# Patient Record
Sex: FEMALE | Race: WHITE | NOT HISPANIC OR LATINO | URBAN - METROPOLITAN AREA
[De-identification: names, ages, dates, MRNs, and addresses within clinical notes are randomized per-mention and may not be internally consistent; named-entity substitution may affect disease eponyms.]

---

## 2022-07-09 ENCOUNTER — INPATIENT (INPATIENT)
Facility: HOSPITAL | Age: 87
LOS: 19 days | Discharge: NURSING, REHAB OR LONG-TERM CARE | DRG: 870 | End: 2022-07-29
Attending: HOSPITALIST | Admitting: SPECIALIST
Payer: MEDICARE

## 2022-07-09 VITALS
RESPIRATION RATE: 18 BRPM | HEART RATE: 61 BPM | HEIGHT: 62 IN | SYSTOLIC BLOOD PRESSURE: 126 MMHG | DIASTOLIC BLOOD PRESSURE: 52 MMHG | OXYGEN SATURATION: 92 % | WEIGHT: 134.92 LBS

## 2022-07-09 DIAGNOSIS — J96.01 ACUTE RESPIRATORY FAILURE WITH HYPOXIA: ICD-10-CM

## 2022-07-09 LAB
ALBUMIN SERPL ELPH-MCNC: 4.2 G/DL — SIGNIFICANT CHANGE UP (ref 3.3–5)
ALP SERPL-CCNC: 67 U/L — SIGNIFICANT CHANGE UP (ref 40–120)
ALT FLD-CCNC: <5 U/L — LOW (ref 10–45)
ANION GAP SERPL CALC-SCNC: 12 MMOL/L — SIGNIFICANT CHANGE UP (ref 5–17)
APPEARANCE UR: ABNORMAL
APTT BLD: 32.3 SEC — SIGNIFICANT CHANGE UP (ref 27.5–35.5)
AST SERPL-CCNC: 16 U/L — SIGNIFICANT CHANGE UP (ref 10–40)
BACTERIA # UR AUTO: ABNORMAL
BASE EXCESS BLDV CALC-SCNC: 3.1 MMOL/L — HIGH (ref -2–2)
BASE EXCESS BLDV CALC-SCNC: 8.1 MMOL/L — HIGH (ref -2–2)
BASOPHILS # BLD AUTO: 0.06 K/UL — SIGNIFICANT CHANGE UP (ref 0–0.2)
BASOPHILS NFR BLD AUTO: 0.7 % — SIGNIFICANT CHANGE UP (ref 0–2)
BILIRUB SERPL-MCNC: 0.2 MG/DL — SIGNIFICANT CHANGE UP (ref 0.2–1.2)
BILIRUB UR-MCNC: NEGATIVE — SIGNIFICANT CHANGE UP
BUN SERPL-MCNC: 34 MG/DL — HIGH (ref 7–23)
CA-I SERPL-SCNC: 1.14 MMOL/L — LOW (ref 1.15–1.33)
CA-I SERPL-SCNC: 1.16 MMOL/L — SIGNIFICANT CHANGE UP (ref 1.15–1.33)
CALCIUM SERPL-MCNC: 8.8 MG/DL — SIGNIFICANT CHANGE UP (ref 8.4–10.5)
CHLORIDE BLDV-SCNC: 92 MMOL/L — LOW (ref 96–108)
CHLORIDE BLDV-SCNC: 95 MMOL/L — LOW (ref 96–108)
CHLORIDE SERPL-SCNC: 95 MMOL/L — LOW (ref 96–108)
CO2 BLDV-SCNC: 39 MMOL/L — HIGH (ref 22–26)
CO2 BLDV-SCNC: 43 MMOL/L — HIGH (ref 22–26)
CO2 SERPL-SCNC: 32 MMOL/L — HIGH (ref 22–31)
COLOR SPEC: SIGNIFICANT CHANGE UP
CREAT SERPL-MCNC: 0.71 MG/DL — SIGNIFICANT CHANGE UP (ref 0.5–1.3)
DIFF PNL FLD: ABNORMAL
EGFR: 80 ML/MIN/1.73M2 — SIGNIFICANT CHANGE UP
EOSINOPHIL # BLD AUTO: 0.03 K/UL — SIGNIFICANT CHANGE UP (ref 0–0.5)
EOSINOPHIL NFR BLD AUTO: 0.4 % — SIGNIFICANT CHANGE UP (ref 0–6)
EPI CELLS # UR: 8 /HPF — HIGH
GAS PNL BLDA: SIGNIFICANT CHANGE UP
GAS PNL BLDV: 130 MMOL/L — LOW (ref 136–145)
GAS PNL BLDV: 133 MMOL/L — LOW (ref 136–145)
GAS PNL BLDV: SIGNIFICANT CHANGE UP
GAS PNL BLDV: SIGNIFICANT CHANGE UP
GLUCOSE BLDV-MCNC: 116 MG/DL — HIGH (ref 70–99)
GLUCOSE BLDV-MCNC: 272 MG/DL — HIGH (ref 70–99)
GLUCOSE SERPL-MCNC: 120 MG/DL — HIGH (ref 70–99)
GLUCOSE UR QL: NEGATIVE — SIGNIFICANT CHANGE UP
HCO3 BLDV-SCNC: 36 MMOL/L — HIGH (ref 22–29)
HCO3 BLDV-SCNC: 40 MMOL/L — HIGH (ref 22–29)
HCT VFR BLD CALC: 38.1 % — SIGNIFICANT CHANGE UP (ref 34.5–45)
HCT VFR BLDA CALC: 32 % — LOW (ref 34.5–46.5)
HCT VFR BLDA CALC: 36 % — SIGNIFICANT CHANGE UP (ref 34.5–46.5)
HGB BLD CALC-MCNC: 10.6 G/DL — LOW (ref 11.7–16.1)
HGB BLD CALC-MCNC: 11.9 G/DL — SIGNIFICANT CHANGE UP (ref 11.7–16.1)
HGB BLD-MCNC: 11.4 G/DL — LOW (ref 11.5–15.5)
HYALINE CASTS # UR AUTO: 1 /LPF — SIGNIFICANT CHANGE UP (ref 0–7)
IMM GRANULOCYTES NFR BLD AUTO: 0.8 % — SIGNIFICANT CHANGE UP (ref 0–1.5)
INR BLD: 1.04 RATIO — SIGNIFICANT CHANGE UP (ref 0.88–1.16)
KETONES UR-MCNC: NEGATIVE — SIGNIFICANT CHANGE UP
LACTATE BLDV-MCNC: 1.5 MMOL/L — SIGNIFICANT CHANGE UP (ref 0.7–2)
LACTATE BLDV-MCNC: 1.5 MMOL/L — SIGNIFICANT CHANGE UP (ref 0.7–2)
LACTATE SERPL-SCNC: 2.9 MMOL/L — HIGH (ref 0.7–2)
LACTATE SERPL-SCNC: 3.7 MMOL/L — HIGH (ref 0.7–2)
LEUKOCYTE ESTERASE UR-ACNC: ABNORMAL
LYMPHOCYTES # BLD AUTO: 0.7 K/UL — LOW (ref 1–3.3)
LYMPHOCYTES # BLD AUTO: 8.3 % — LOW (ref 13–44)
MCHC RBC-ENTMCNC: 29.9 GM/DL — LOW (ref 32–36)
MCHC RBC-ENTMCNC: 29.9 PG — SIGNIFICANT CHANGE UP (ref 27–34)
MCV RBC AUTO: 100 FL — SIGNIFICANT CHANGE UP (ref 80–100)
MONOCYTES # BLD AUTO: 0.68 K/UL — SIGNIFICANT CHANGE UP (ref 0–0.9)
MONOCYTES NFR BLD AUTO: 8.1 % — SIGNIFICANT CHANGE UP (ref 2–14)
NEUTROPHILS # BLD AUTO: 6.85 K/UL — SIGNIFICANT CHANGE UP (ref 1.8–7.4)
NEUTROPHILS NFR BLD AUTO: 81.7 % — HIGH (ref 43–77)
NITRITE UR-MCNC: NEGATIVE — SIGNIFICANT CHANGE UP
NRBC # BLD: 0 /100 WBCS — SIGNIFICANT CHANGE UP (ref 0–0)
NT-PROBNP SERPL-SCNC: 3075 PG/ML — HIGH (ref 0–300)
PCO2 BLDV: 104 MMHG — HIGH (ref 39–42)
PCO2 BLDV: 117 MMHG — HIGH (ref 39–42)
PH BLDV: 7.09 — CRITICAL LOW (ref 7.32–7.43)
PH BLDV: 7.19 — CRITICAL LOW (ref 7.32–7.43)
PH UR: 6 — SIGNIFICANT CHANGE UP (ref 5–8)
PLATELET # BLD AUTO: 254 K/UL — SIGNIFICANT CHANGE UP (ref 150–400)
PO2 BLDV: 29 MMHG — SIGNIFICANT CHANGE UP (ref 25–45)
PO2 BLDV: 38 MMHG — SIGNIFICANT CHANGE UP (ref 25–45)
POTASSIUM BLDV-SCNC: 5 MMOL/L — SIGNIFICANT CHANGE UP (ref 3.5–5.1)
POTASSIUM BLDV-SCNC: 5.6 MMOL/L — HIGH (ref 3.5–5.1)
POTASSIUM SERPL-MCNC: 5 MMOL/L — SIGNIFICANT CHANGE UP (ref 3.5–5.3)
POTASSIUM SERPL-SCNC: 5 MMOL/L — SIGNIFICANT CHANGE UP (ref 3.5–5.3)
PROCALCITONIN SERPL-MCNC: 0.11 NG/ML — HIGH (ref 0.02–0.1)
PROT SERPL-MCNC: 7.4 G/DL — SIGNIFICANT CHANGE UP (ref 6–8.3)
PROT UR-MCNC: ABNORMAL
PROTHROM AB SERPL-ACNC: 12 SEC — SIGNIFICANT CHANGE UP (ref 10.5–13.4)
RAPID RVP RESULT: SIGNIFICANT CHANGE UP
RBC # BLD: 3.81 M/UL — SIGNIFICANT CHANGE UP (ref 3.8–5.2)
RBC # FLD: 14.8 % — HIGH (ref 10.3–14.5)
RBC CASTS # UR COMP ASSIST: 7 /HPF — HIGH (ref 0–4)
SAO2 % BLDV: 38.5 % — LOW (ref 67–88)
SAO2 % BLDV: 63.6 % — LOW (ref 67–88)
SARS-COV-2 RNA SPEC QL NAA+PROBE: SIGNIFICANT CHANGE UP
SODIUM SERPL-SCNC: 139 MMOL/L — SIGNIFICANT CHANGE UP (ref 135–145)
SP GR SPEC: 1.01 — LOW (ref 1.01–1.02)
TROPONIN T, HIGH SENSITIVITY RESULT: 46 NG/L — SIGNIFICANT CHANGE UP (ref 0–51)
UROBILINOGEN FLD QL: NEGATIVE — SIGNIFICANT CHANGE UP
WBC # BLD: 8.39 K/UL — SIGNIFICANT CHANGE UP (ref 3.8–10.5)
WBC # FLD AUTO: 8.39 K/UL — SIGNIFICANT CHANGE UP (ref 3.8–10.5)
WBC UR QL: 14 /HPF — HIGH (ref 0–5)

## 2022-07-09 PROCEDURE — 93010 ELECTROCARDIOGRAM REPORT: CPT | Mod: 59,GC

## 2022-07-09 PROCEDURE — 70450 CT HEAD/BRAIN W/O DYE: CPT | Mod: 26

## 2022-07-09 PROCEDURE — 99291 CRITICAL CARE FIRST HOUR: CPT | Mod: 25

## 2022-07-09 PROCEDURE — 71045 X-RAY EXAM CHEST 1 VIEW: CPT | Mod: 26,76

## 2022-07-09 PROCEDURE — 99291 CRITICAL CARE FIRST HOUR: CPT | Mod: FT,CS,25,GC

## 2022-07-09 PROCEDURE — 31500 INSERT EMERGENCY AIRWAY: CPT | Mod: GC

## 2022-07-09 PROCEDURE — 71045 X-RAY EXAM CHEST 1 VIEW: CPT | Mod: 26,77

## 2022-07-09 RX ORDER — IPRATROPIUM/ALBUTEROL SULFATE 18-103MCG
3 AEROSOL WITH ADAPTER (GRAM) INHALATION ONCE
Refills: 0 | Status: COMPLETED | OUTPATIENT
Start: 2022-07-09 | End: 2022-07-09

## 2022-07-09 RX ORDER — LOSARTAN POTASSIUM 100 MG/1
50 TABLET, FILM COATED ORAL DAILY
Refills: 0 | Status: DISCONTINUED | OUTPATIENT
Start: 2022-07-09 | End: 2022-07-19

## 2022-07-09 RX ORDER — ONDANSETRON 8 MG/1
4 TABLET, FILM COATED ORAL ONCE
Refills: 0 | Status: COMPLETED | OUTPATIENT
Start: 2022-07-09 | End: 2022-07-09

## 2022-07-09 RX ORDER — FUROSEMIDE 40 MG
40 TABLET ORAL DAILY
Refills: 0 | Status: DISCONTINUED | OUTPATIENT
Start: 2022-07-09 | End: 2022-07-09

## 2022-07-09 RX ORDER — VANCOMYCIN HCL 1 G
1000 VIAL (EA) INTRAVENOUS EVERY 12 HOURS
Refills: 0 | Status: DISCONTINUED | OUTPATIENT
Start: 2022-07-09 | End: 2022-07-10

## 2022-07-09 RX ORDER — HYDRALAZINE HCL 50 MG
5 TABLET ORAL ONCE
Refills: 0 | Status: COMPLETED | OUTPATIENT
Start: 2022-07-09 | End: 2022-07-09

## 2022-07-09 RX ORDER — METOPROLOL TARTRATE 50 MG
50 TABLET ORAL DAILY
Refills: 0 | Status: DISCONTINUED | OUTPATIENT
Start: 2022-07-09 | End: 2022-07-10

## 2022-07-09 RX ORDER — FENTANYL CITRATE 50 UG/ML
1 INJECTION INTRAVENOUS
Qty: 5000 | Refills: 0 | Status: DISCONTINUED | OUTPATIENT
Start: 2022-07-09 | End: 2022-07-11

## 2022-07-09 RX ORDER — ESCITALOPRAM OXALATE 10 MG/1
1 TABLET, FILM COATED ORAL
Qty: 0 | Refills: 0 | DISCHARGE

## 2022-07-09 RX ORDER — LEVOTHYROXINE SODIUM 125 MCG
100 TABLET ORAL DAILY
Refills: 0 | Status: DISCONTINUED | OUTPATIENT
Start: 2022-07-09 | End: 2022-07-29

## 2022-07-09 RX ORDER — SENNA PLUS 8.6 MG/1
1 TABLET ORAL
Qty: 0 | Refills: 0 | DISCHARGE

## 2022-07-09 RX ORDER — PIPERACILLIN AND TAZOBACTAM 4; .5 G/20ML; G/20ML
3.38 INJECTION, POWDER, LYOPHILIZED, FOR SOLUTION INTRAVENOUS ONCE
Refills: 0 | Status: COMPLETED | OUTPATIENT
Start: 2022-07-09 | End: 2022-07-09

## 2022-07-09 RX ORDER — LOSARTAN POTASSIUM 100 MG/1
50 TABLET, FILM COATED ORAL DAILY
Refills: 0 | Status: DISCONTINUED | OUTPATIENT
Start: 2022-07-09 | End: 2022-07-09

## 2022-07-09 RX ORDER — MIRTAZAPINE 45 MG/1
1 TABLET, ORALLY DISINTEGRATING ORAL
Qty: 0 | Refills: 0 | DISCHARGE

## 2022-07-09 RX ORDER — PIPERACILLIN AND TAZOBACTAM 4; .5 G/20ML; G/20ML
3.38 INJECTION, POWDER, LYOPHILIZED, FOR SOLUTION INTRAVENOUS EVERY 8 HOURS
Refills: 0 | Status: DISCONTINUED | OUTPATIENT
Start: 2022-07-09 | End: 2022-07-09

## 2022-07-09 RX ORDER — MOMETASONE FUROATE 220 UG/1
1 INHALANT RESPIRATORY (INHALATION) DAILY
Refills: 0 | Status: DISCONTINUED | OUTPATIENT
Start: 2022-07-09 | End: 2022-07-09

## 2022-07-09 RX ORDER — AZITHROMYCIN 500 MG/1
500 TABLET, FILM COATED ORAL ONCE
Refills: 0 | Status: COMPLETED | OUTPATIENT
Start: 2022-07-09 | End: 2022-07-09

## 2022-07-09 RX ORDER — PIPERACILLIN AND TAZOBACTAM 4; .5 G/20ML; G/20ML
3.38 INJECTION, POWDER, LYOPHILIZED, FOR SOLUTION INTRAVENOUS EVERY 8 HOURS
Refills: 0 | Status: DISCONTINUED | OUTPATIENT
Start: 2022-07-09 | End: 2022-07-11

## 2022-07-09 RX ORDER — COLLAGENASE CLOSTRIDIUM HIST. 250 UNIT/G
1 OINTMENT (GRAM) TOPICAL
Qty: 0 | Refills: 0 | DISCHARGE

## 2022-07-09 RX ORDER — CHLORHEXIDINE GLUCONATE 213 G/1000ML
15 SOLUTION TOPICAL EVERY 12 HOURS
Refills: 0 | Status: DISCONTINUED | OUTPATIENT
Start: 2022-07-09 | End: 2022-07-15

## 2022-07-09 RX ORDER — AMLODIPINE BESYLATE 2.5 MG/1
10 TABLET ORAL DAILY
Refills: 0 | Status: DISCONTINUED | OUTPATIENT
Start: 2022-07-09 | End: 2022-07-20

## 2022-07-09 RX ORDER — FUROSEMIDE 40 MG
40 TABLET ORAL DAILY
Refills: 0 | Status: COMPLETED | OUTPATIENT
Start: 2022-07-09 | End: 2022-07-13

## 2022-07-09 RX ORDER — MIDAZOLAM HYDROCHLORIDE 1 MG/ML
2 INJECTION, SOLUTION INTRAMUSCULAR; INTRAVENOUS ONCE
Refills: 0 | Status: DISCONTINUED | OUTPATIENT
Start: 2022-07-09 | End: 2022-07-09

## 2022-07-09 RX ORDER — ASCORBIC ACID 60 MG
1 TABLET,CHEWABLE ORAL
Qty: 0 | Refills: 0 | DISCHARGE

## 2022-07-09 RX ORDER — SIMVASTATIN 20 MG/1
1 TABLET, FILM COATED ORAL
Qty: 0 | Refills: 0 | DISCHARGE

## 2022-07-09 RX ORDER — APIXABAN 2.5 MG/1
5 TABLET, FILM COATED ORAL EVERY 12 HOURS
Refills: 0 | Status: DISCONTINUED | OUTPATIENT
Start: 2022-07-09 | End: 2022-07-23

## 2022-07-09 RX ORDER — CHLORHEXIDINE GLUCONATE 213 G/1000ML
1 SOLUTION TOPICAL
Refills: 0 | Status: DISCONTINUED | OUTPATIENT
Start: 2022-07-09 | End: 2022-07-29

## 2022-07-09 RX ORDER — IPRATROPIUM/ALBUTEROL SULFATE 18-103MCG
3 AEROSOL WITH ADAPTER (GRAM) INHALATION
Qty: 0 | Refills: 0 | DISCHARGE

## 2022-07-09 RX ORDER — PIPERACILLIN AND TAZOBACTAM 4; .5 G/20ML; G/20ML
3.38 INJECTION, POWDER, LYOPHILIZED, FOR SOLUTION INTRAVENOUS ONCE
Refills: 0 | Status: DISCONTINUED | OUTPATIENT
Start: 2022-07-09 | End: 2022-07-09

## 2022-07-09 RX ORDER — LANOLIN ALCOHOL/MO/W.PET/CERES
1 CREAM (GRAM) TOPICAL
Qty: 0 | Refills: 0 | DISCHARGE

## 2022-07-09 RX ORDER — AMLODIPINE BESYLATE 2.5 MG/1
10 TABLET ORAL DAILY
Refills: 0 | Status: DISCONTINUED | OUTPATIENT
Start: 2022-07-09 | End: 2022-07-09

## 2022-07-09 RX ORDER — ROCURONIUM BROMIDE 10 MG/ML
100 VIAL (ML) INTRAVENOUS ONCE
Refills: 0 | Status: COMPLETED | OUTPATIENT
Start: 2022-07-09 | End: 2022-07-09

## 2022-07-09 RX ORDER — CARBIDOPA AND LEVODOPA 25; 100 MG/1; MG/1
1 TABLET ORAL THREE TIMES A DAY
Refills: 0 | Status: DISCONTINUED | OUTPATIENT
Start: 2022-07-09 | End: 2022-07-29

## 2022-07-09 RX ORDER — METOPROLOL TARTRATE 50 MG
50 TABLET ORAL DAILY
Refills: 0 | Status: DISCONTINUED | OUTPATIENT
Start: 2022-07-09 | End: 2022-07-09

## 2022-07-09 RX ORDER — DEXMEDETOMIDINE HYDROCHLORIDE IN 0.9% SODIUM CHLORIDE 4 UG/ML
0.2 INJECTION INTRAVENOUS
Qty: 200 | Refills: 0 | Status: DISCONTINUED | OUTPATIENT
Start: 2022-07-09 | End: 2022-07-10

## 2022-07-09 RX ORDER — SODIUM CHLORIDE 9 MG/ML
500 INJECTION INTRAMUSCULAR; INTRAVENOUS; SUBCUTANEOUS ONCE
Refills: 0 | Status: COMPLETED | OUTPATIENT
Start: 2022-07-09 | End: 2022-07-09

## 2022-07-09 RX ORDER — DOCUSATE SODIUM 100 MG
1 CAPSULE ORAL
Qty: 0 | Refills: 0 | DISCHARGE

## 2022-07-09 RX ORDER — BETHANECHOL CHLORIDE 25 MG
25 TABLET ORAL THREE TIMES A DAY
Refills: 0 | Status: DISCONTINUED | OUTPATIENT
Start: 2022-07-09 | End: 2022-07-26

## 2022-07-09 RX ORDER — ACETAMINOPHEN 500 MG
650 TABLET ORAL EVERY 6 HOURS
Refills: 0 | Status: DISCONTINUED | OUTPATIENT
Start: 2022-07-09 | End: 2022-07-12

## 2022-07-09 RX ORDER — ETOMIDATE 2 MG/ML
10 INJECTION INTRAVENOUS ONCE
Refills: 0 | Status: COMPLETED | OUTPATIENT
Start: 2022-07-09 | End: 2022-07-09

## 2022-07-09 RX ORDER — COLLAGENASE CLOSTRIDIUM HIST. 250 UNIT/G
1 OINTMENT (GRAM) TOPICAL DAILY
Refills: 0 | Status: DISCONTINUED | OUTPATIENT
Start: 2022-07-09 | End: 2022-07-12

## 2022-07-09 RX ADMIN — SODIUM CHLORIDE 500 MILLILITER(S): 9 INJECTION INTRAMUSCULAR; INTRAVENOUS; SUBCUTANEOUS at 03:51

## 2022-07-09 RX ADMIN — ONDANSETRON 4 MILLIGRAM(S): 8 TABLET, FILM COATED ORAL at 02:03

## 2022-07-09 RX ADMIN — CHLORHEXIDINE GLUCONATE 15 MILLILITER(S): 213 SOLUTION TOPICAL at 18:15

## 2022-07-09 RX ADMIN — Medication 250 MILLIGRAM(S): at 18:14

## 2022-07-09 RX ADMIN — Medication 3 MILLILITER(S): at 02:18

## 2022-07-09 RX ADMIN — DEXMEDETOMIDINE HYDROCHLORIDE IN 0.9% SODIUM CHLORIDE 3.06 MICROGRAM(S)/KG/HR: 4 INJECTION INTRAVENOUS at 06:58

## 2022-07-09 RX ADMIN — Medication 100 MILLIGRAM(S): at 04:41

## 2022-07-09 RX ADMIN — Medication 650 MILLIGRAM(S): at 21:25

## 2022-07-09 RX ADMIN — PIPERACILLIN AND TAZOBACTAM 25 GRAM(S): 4; .5 INJECTION, POWDER, LYOPHILIZED, FOR SOLUTION INTRAVENOUS at 14:10

## 2022-07-09 RX ADMIN — CARBIDOPA AND LEVODOPA 1 TABLET(S): 25; 100 TABLET ORAL at 21:28

## 2022-07-09 RX ADMIN — Medication 25 MILLIGRAM(S): at 21:27

## 2022-07-09 RX ADMIN — CHLORHEXIDINE GLUCONATE 15 MILLILITER(S): 213 SOLUTION TOPICAL at 07:21

## 2022-07-09 RX ADMIN — FENTANYL CITRATE 3.06 MICROGRAM(S)/KG/HR: 50 INJECTION INTRAVENOUS at 06:58

## 2022-07-09 RX ADMIN — Medication 3 MILLILITER(S): at 02:00

## 2022-07-09 RX ADMIN — MIDAZOLAM HYDROCHLORIDE 2 MILLIGRAM(S): 1 INJECTION, SOLUTION INTRAMUSCULAR; INTRAVENOUS at 06:58

## 2022-07-09 RX ADMIN — DEXMEDETOMIDINE HYDROCHLORIDE IN 0.9% SODIUM CHLORIDE 3.06 MICROGRAM(S)/KG/HR: 4 INJECTION INTRAVENOUS at 14:10

## 2022-07-09 RX ADMIN — AZITHROMYCIN 255 MILLIGRAM(S): 500 TABLET, FILM COATED ORAL at 05:02

## 2022-07-09 RX ADMIN — Medication 5 MILLIGRAM(S): at 21:24

## 2022-07-09 RX ADMIN — DEXMEDETOMIDINE HYDROCHLORIDE IN 0.9% SODIUM CHLORIDE 3.06 MICROGRAM(S)/KG/HR: 4 INJECTION INTRAVENOUS at 22:00

## 2022-07-09 RX ADMIN — PIPERACILLIN AND TAZOBACTAM 25 GRAM(S): 4; .5 INJECTION, POWDER, LYOPHILIZED, FOR SOLUTION INTRAVENOUS at 10:15

## 2022-07-09 RX ADMIN — Medication 250 MILLIGRAM(S): at 08:02

## 2022-07-09 RX ADMIN — FENTANYL CITRATE 3.06 MICROGRAM(S)/KG/HR: 50 INJECTION INTRAVENOUS at 05:00

## 2022-07-09 RX ADMIN — Medication 1 APPLICATION(S): at 21:28

## 2022-07-09 RX ADMIN — Medication 40 MILLIGRAM(S): at 12:34

## 2022-07-09 RX ADMIN — PIPERACILLIN AND TAZOBACTAM 200 GRAM(S): 4; .5 INJECTION, POWDER, LYOPHILIZED, FOR SOLUTION INTRAVENOUS at 02:19

## 2022-07-09 RX ADMIN — ETOMIDATE 10 MILLIGRAM(S): 2 INJECTION INTRAVENOUS at 04:40

## 2022-07-09 RX ADMIN — PIPERACILLIN AND TAZOBACTAM 25 GRAM(S): 4; .5 INJECTION, POWDER, LYOPHILIZED, FOR SOLUTION INTRAVENOUS at 21:25

## 2022-07-09 NOTE — ED PROVIDER NOTE - CARE PLAN
1 Principal Discharge DX:	Acute respiratory failure with hypoxia and hypercapnia  Secondary Diagnosis:	Pneumonia

## 2022-07-09 NOTE — H&P ADULT - ASSESSMENT
89 year old female with PMH of Parkinson's disease, HTN, chronic DVT on Eliquis, and hypothyroidism  who was admitted for acute hypoxic respiratory failure most likely due to pneumonia. Intubated in the ED.     Neuro:  - AOx1 on presentation, unknown baseline   - Sedated with fentanyl, will gradually transition to only precedex  - unequal pupils, CT head ordered, will f/u  #Parkinson  -continue carbidopa/levodopa via OG tube     Cardiovascular:  #Heart-failure?  - BNP 3075  - ordered echo, will f/u  - started home dose of lasix   #Chronic DVT  - will continue Eliquis once CT head negative     #Hypertension  - started BP home meds, Losartan, Metoprolol and Amlodipine     Pulmonary:  #Acute Hypoxic Respiratory Failure   - Intubated, Vent (A/C) Volume: 400 Rate: 24 FiO2: 50 PEEP: 5  - ABG in the morning   #Pneumonia  - Starting Zosyn and Vancomycin (has decubitus ulcer, stage 4 on back)  - Consider bronchoscopy for mucus plug   - follow up sputum cultures   #COPD?  - Uses fluticasone and Duoneb at nursing home, will continue    GI/Nutrition:  - No active issues   - trickle feeds via OG tube     Renal:  - Creatinine .73  - No active issues   - Monitor urine output   - CMP daily     ID:  - Started Zosyn for potential aspiration pneumonia and uti (+U/A)   - Started Vancomycin due to decubitals ulcer  - Nasal Swab for MRSA ordered, will f/u  - F/u blood, urine, and sputum cultures     Heme:   #anemia  - Hemoglobin 11.4, unclear baseline. No active bleeding   - CBC daily     Endocrine:  #Hypothyroidism  - will continue home med, Synthroid     Ethics:  -CODE STATUS UNKNOWN. Unable to reach healthcare proxy overnight.     DVT: Eliquis                        89 year old female with PMH of Parkinson's disease, HTN, chronic DVT on Eliquis, and hypothyroidism  who was admitted for acute hypoxic respiratory failure most likely due to pneumonia. Intubated in the ED.     Neuro:  - AOx1 on presentation, unknown baseline   - Sedated with fentanyl, will gradually transition to only precedex  - unequal pupils, CT head ordered, will f/u  #Parkinson  -continue carbidopa/levodopa via NG tube     Cardiovascular:  #Heart-failure?  - BNP 3075  - ordered echo, will f/u  - started home dose of lasix   #Chronic DVT  - will continue Eliquis once CT head negative     #Hypertension  - started BP home meds, Losartan, Metoprolol and Amlodipine     Pulmonary:  #Acute Hypoxic Respiratory Failure   - Intubated, Vent (A/C) Volume: 400 Rate: 24 FiO2: 50 PEEP: 5  - ABG in the morning   #Pneumonia  - Starting Zosyn and Vancomycin (has decubitus ulcer, stage 4 on back)  - Consider bronchoscopy for mucus plug   - follow up sputum cultures   #COPD?  - Uses fluticasone and Duoneb at nursing home, will continue    GI/Nutrition:  - No active issues   - trickle feeds via OG tube     Renal:  - Creatinine .73  - No active issues   - Monitor urine output   - CMP daily     ID:  - Started Zosyn for potential aspiration pneumonia and uti (+U/A)   - Started Vancomycin due to decubitals ulcer  - Nasal Swab for MRSA ordered, will f/u  - F/u blood, urine, and sputum cultures     Heme:   #anemia  - Hemoglobin 11.4, unclear baseline. No active bleeding   - CBC daily     Endocrine:  #Hypothyroidism  - will continue home med, Synthroid     Skin  #decubitus ulcer, stage 4   - wound care consult, will f/u    Ethics:  -CODE STATUS UNKNOWN. Unable to reach healthcare proxy overnight.     DVT: Eliquis                        89 year old female with PMH of Parkinson's disease, HTN, chronic DVT on Eliquis, and hypothyroidism  who was admitted for acute hypoxic respiratory failure most likely due to pneumonia. Intubated in the ED.     Neuro:  - AOx1 on presentation, unknown baseline   - Sedated with fentanyl, will gradually transition to only precedex  - unequal pupils, CT head ordered, will f/u  #Parkinson  -continue carbidopa/levodopa via OG tube     Cardiovascular:  #Heart-failure?  - BNP 3075  - ordered echo, will f/u  - started home dose of lasix   #Chronic DVT  - will continue Eliquis once CT head negative     #Hypertension  - started BP home meds, Losartan, Metoprolol and Amlodipine     Pulmonary:  #Acute Hypoxic Respiratory Failure   - Intubated, Vent (A/C) Volume: 400 Rate: 24 FiO2: 50 PEEP: 5  - ABG in the morning   #Pneumonia  - Starting Zosyn and Vancomycin (has decubitus ulcer, stage 4 on back)  - Consider bronchoscopy for mucus plug   - follow up sputum cultures   #COPD?  - Uses fluticasone and Duoneb at nursing home, will continue    GI/Nutrition:  - No active issues   - trickle feeds via OG tube     Renal:  - Creatinine .73  - No active issues   - Monitor urine output   - CMP daily     ID:  - Started Zosyn for potential aspiration pneumonia and uti (+U/A)   - Started Vancomycin due to decubitals ulcer  - Nasal Swab for MRSA ordered, will f/u  - F/u blood, urine, and sputum cultures     Heme:   #anemia  - Hemoglobin 11.4, unclear baseline. No active bleeding   - CBC daily     Endocrine:  #Hypothyroidism  - will continue home med, Synthroid     Skin  #decubitus ulcer, stage 4   - wound care consult, will f/u    Ethics:  -CODE STATUS UNKNOWN. Unable to reach healthcare proxy overnight.     DVT: Eliquis

## 2022-07-09 NOTE — ED ADULT NURSE NOTE - OBJECTIVE STATEMENT
pt is a 89 y.o female being brought in by EMS from nursing home. EMS states patients oxygen saturation was low, nursing home called for pt to come to hospital. Upon assessment, pt is A&Ox1, breathing non labored on 6L nasal cannula, NSR on cardiac monitor, skin warm/dry, unstageable pressure injury to sacrum area, pressure injury cleaned/dressing changed, patient afebrile. Hep lock placed/intact, labs drawn/sent, vitals taken, pt repositioned/changed, purewick applied/suctioning, all safety measures in place, pt resting comfortably in bed, dispo pending lab results.

## 2022-07-09 NOTE — H&P ADULT - NSHPPHYSICALEXAM_GEN_ALL_CORE
General:   HEENT:   Neck:   Respiratory:   Cardiovascular:   Abdomen:   Extremities:  Neurological: General: Sedated and Intubated in no acute distress  HEENT: Pupils reactive to light, asymmetrical pupils   Neck: No thyromegaly, No JVD, no tender lymphadenopathy   Respiratory: Rhodochrous breath sounds bilaterally, no wheezes, no crackles    Cardiovascular: Regular rate and rhythm, no murmurs, rubs, or gallops   Abdomen: Soft, non-distended, non-tender, normoactive bowel sounds  Extremities: Warm, well perfused, no pedal edema   Neurological: Intubated and Sedated, does not follow verbal commands

## 2022-07-09 NOTE — ED PROVIDER NOTE - NSICDXPASTMEDICALHX_GEN_ALL_CORE_FT
PAST MEDICAL HISTORY:  DVT, lower extremity     HLD (hyperlipidemia)     HTN (hypertension)     Hypothyroid     Parkinsons

## 2022-07-09 NOTE — ED PROVIDER NOTE - ATTENDING CONTRIBUTION TO CARE
Attending (Dustin Diallo D.O.): I have personally seen and examined this patient. I have performed a substantive portion of the visit including all aspects of the medical decision making. Resident and/or ACP note reviewed. I agree on the plan of care except where noted.    I have personally provided 31 minutes of critical care concurrently with the resident(s) and/or ACP(s), excluding time spent on separate procedures.     89F hx of Parkinson's disease, HTN, chronic DVT on Eliquis, and hypothyroidism here from [facility] 2/2 hypoxia, unclear to what extent. Found on their xray to have pneumonitis, started on zosyn. Patient here abcs intacts though bradycardic to 50s. Sat 95%+ on 4L NC, contractures to all 4 extremities/ridgitis, suspect from parkinsons. Suspect sepsis 2/2 now likely PNA causing acute hypoxic resp failure. Check labs, lactate, UA, CXs, CXR, screening EKG, empiric abxs, nebs given scant exp wheezes on exam-> crystalloid infusion as clinically warranted.

## 2022-07-09 NOTE — ED ADULT NURSE REASSESSMENT NOTE - NS ED NURSE REASSESS COMMENT FT1
patient straight cath'd using sterile technique with 2 RN's at bedside, urine flowing to gravity yellow/clear, UA/UC sent.

## 2022-07-09 NOTE — PROGRESS NOTE ADULT - ASSESSMENT
89 year old female with PMH of Parkinson's disease, HTN, chronic DVT on Eliquis, and hypothyroidism  who was admitted for acute hypoxic respiratory failure most likely due to pneumonia. Intubated in the ED.     Neuro:  - AOx1 on presentation, unknown baseline   - Sedated with fentanyl, will gradually transition to only precedex  - unequal pupils, CT head ordered, will f/u  #Parkinson  -continue carbidopa/levodopa via OG tube     Cardiovascular:  #Heart-failure?  - BNP 3075  - ordered echo, will f/u  - started home dose of lasix   #Chronic DVT  - will continue Eliquis once CT head negative     #Hypertension  - started BP home meds, Losartan, Metoprolol and Amlodipine     Pulmonary:  #Acute Hypoxic Respiratory Failure   - Intubated, Vent (A/C) Volume: 400 Rate: 24 FiO2: 50 PEEP: 5  - ABG in the morning   #Pneumonia  - Starting Zosyn and Vancomycin (has decubitus ulcer, stage 4 on back)  - Consider bronchoscopy for mucus plug   - follow up sputum cultures   #COPD?  - Uses fluticasone and Duoneb at nursing home, will continue    GI/Nutrition:  - No active issues   - trickle feeds via OG tube     Renal:  - Creatinine .73  - No active issues   - Monitor urine output   - CMP daily     ID:  - Started Zosyn for potential aspiration pneumonia and uti (+U/A)   - Started Vancomycin due to decubitals ulcer  - Nasal Swab for MRSA ordered, will f/u  - F/u blood, urine, and sputum cultures     Heme:   #anemia  - Hemoglobin 11.4, unclear baseline. No active bleeding   - CBC daily     Endocrine:  #Hypothyroidism  - will continue home med, Synthroid     Skin  #decubitus ulcer, stage 4   - wound care consult, will f/u    Ethics:  -CODE STATUS UNKNOWN. Unable to reach healthcare proxy overnight.     DVT: Eliquis                        89 year old female with PMH of Parkinson's disease, HTN, chronic DVT on Eliquis, and hypothyroidism  who was admitted for acute hypoxic respiratory failure most likely 2/2 PNA, currently intubated.     Neuro:  - AOx1 on presentation, unknown baseline   - Sedated with fentanyl, will gradually transition to only precedex  - unequal pupils, CT head ordered, will f/u    #Parkinson  -continue carbidopa/levodopa via OG tube     Cardiovascular:  #Heart-failure?  - BNP 3075  - ordered echo, will f/u  - started home dose of lasix   #Chronic DVT  - will continue Eliquis once CT head negative     #Hypertension  - holding home meds     Pulmonary:  #Acute Hypoxic Respiratory Failure   - Intubated, Vent (A/C) Volume: 400 Rate: 24 FiO2: 50 PEEP: 5  - will pull back ET tube by 2cm and do repeat CXR  - ABG in the morning   #Pneumonia  - Starting Zosyn and Vancomycin (has decubitus ulcer, stage 4 on back)  - Consider bronchoscopy for mucus plug   - follow up sputum cultures   #COPD?  - Uses fluticasone and Duoneb at nursing home, will continue    GI/Nutrition:  - No active issues   - trickle feeds via OG tube     Renal:  - Creatinine .73  - No active issues   - Monitor urine output, foster  - 40 IV lasix now, will watch for response   - f/u Cr  - CMP daily     ID:  - Started Zosyn for potential aspiration pneumonia and uti (+U/A)   - Started Vancomycin due to decubitals ulcer  - Nasal Swab for MRSA ordered, will f/u  - F/u blood, urine, and sputum cultures     Heme:   #anemia  - Hemoglobin 11.4, unclear baseline. No active bleeding   - CBC daily     Endocrine:  #Hypothyroidism  - will continue home med, Synthroid     Skin  #decubitus ulcer, stage 4   - wound care consult, will f/u    Ethics:  -CODE STATUS UNKNOWN. Unable to reach healthcare proxy overnight. Will find out about court appointed guardian.    DVT: Eliquis                        89 year old female with PMH of Parkinson's disease, HTN, chronic DVT on Eliquis, and hypothyroidism  who was admitted for acute hypoxic respiratory failure most likely 2/2 PNA, currently intubated and treated with zosyn and vancomycin.     Neuro:  - AOx1 on presentation, unknown baseline   - Sedated with fentanyl, will gradually transition to only precedex  - unequal pupils noted on exam  - CT head with no acute intracranial hemorrhage, hydrocephalus or extra-axial fluid collection. mild parenchymal volume loss and mild chronic microvascular ischemic changes    #Parkinson  -continue carbidopa/levodopa via OG tube     Cardiovascular:  #Heart-failure?  - BNP 3075  - ordered echo, will f/u  - 40mg IV lasix now, monitor I/Os    #Chronic DVT  - CT head negative  - apixaban 5mg q12hrs    #Hypertension  - c/w home amlodipine     Pulmonary:  #Acute Hypoxic Respiratory Failure   - Intubated, Vent (A/C) Volume: 400 Rate: 24 FiO2: 50 PEEP: 5  - will pull back ET tube by 2cm and do repeat CXR  - ABG in the morning     #Pneumonia  - Starting Zosyn and Vancomycin (has decubitus ulcer, stage 4 on back)  - Consider bronchoscopy for mucus plug   - follow up sputum cultures   #COPD?  - Uses fluticasone and Duoneb at nursing home, will continue    GI/Nutrition:  - No active issues   - trickle feeds via OG tube     Renal:  - Creatinine 0.73  - Monitor urine output, foster  - 40 IV lasix now, will watch for response   - f/u Cr, CMP daily    ID:  - Started Zosyn for potential aspiration pneumonia and uti (+U/A)   - Started Vancomycin due to decubitals ulcer  - Nasal Swab for MRSA ordered, will f/u  - F/u blood, urine, and sputum cultures     Heme:   #anemia  - Hemoglobin 11.4, unclear baseline. No active bleeding   - CBC daily     Endocrine:  #Hypothyroidism  - will continue home med, Synthroid     Skin  #decubitus ulcer, stage 4   - wound care consult, will f/u    Ethics:  -CODE STATUS UNKNOWN. Unable to reach healthcare proxy overnight. Will find out about court appointed guardian.    DVT: Eliquis                        89 year old female with PMH of Parkinson's disease, HTN, chronic DVT on Eliquis, and hypothyroidism  who was admitted for acute hypoxic respiratory failure most likely 2/2 PNA, currently intubated and treated with zosyn and vancomycin.     Neuro:  - AOx1 on presentation, unknown baseline   - Sedated with fentanyl, will gradually transition to only precedex  - unequal pupils noted on exam  - CT head with no acute intracranial hemorrhage, hydrocephalus or extra-axial fluid collection. mild parenchymal volume loss and mild chronic microvascular ischemic changes    #Parkinson  -continue carbidopa/levodopa via OG tube     Cardiovascular:  #Heart-failure?  - BNP 3075  - ordered echo, will f/u  - 40mg IV lasix now, monitor I/Os    #Chronic DVT  - CT head negative  - apixaban 5mg q12hrs    #Hypertension  - c/w home amlodipine     Pulmonary:  #Acute Hypoxic Respiratory Failure   - Intubated, Vent (A/C) Volume: 400 Rate: 24 FiO2: 50 PEEP: 5  - will pull back ET tube by 2cm and do repeat CXR  - ABG in the morning     #Pneumonia  - Starting Zosyn and Vancomycin (has decubitus ulcer, stage 4 on back)  - Consider bronchoscopy for mucus plug   - follow up sputum cultures   #COPD?  - Uses fluticasone and Duoneb at nursing home, will continue    GI/Nutrition:  - No active issues   - trickle feeds via OG tube     Renal:  - Creatinine 0.73  - Monitor urine output, foster  - 40 IV lasix now, will watch for response   - f/u Cr, CMP daily    ID:  - Started Zosyn for potential aspiration pneumonia and uti (+U/A)   - Started Vancomycin due to decubitals ulcer  - Nasal Swab for MRSA ordered, will f/u  - F/u blood, urine, and sputum cultures     Heme:   #anemia  - Hemoglobin 11.4, unclear baseline. No active bleeding   - CBC daily     Endocrine:  #Hypothyroidism  - will continue home med, Synthroid     Skin  #decubitus ulcer, stage 4   - wound care consult, will f/u    Ethics:  - FULL CODE pending clarification with guardian. Unable to reach on Friday overnight and Saturday.     DVT: Eliquis

## 2022-07-09 NOTE — PROGRESS NOTE ADULT - SUBJECTIVE AND OBJECTIVE BOX
Progress Note    22    89 year old female with PMH of Parkinson's disease, HTN, chronic DVT on Eliquis, and hypothyroidism who was brought in by EMS for shortness of breath. Limited history, obtained through paperwork from nursing home. Patient was suspected to have bilateral pneumonia based on chest X-ray at nursing home yesterday . On initial presentation patient was afebrile, had non labored breathing on 6L nasal canula, and was AOx1. In the ED, patient was found to be hypercapnic with a PCO2 of 104 and pH of 7.19 off a VBG. Patient was placed on avaps; however she continued to be hypercapnic and acidotic with repeat VBG showing PCO2 of 117 and pH of 7.09. At this time, patient was intubated.       Subjective / Overnight Events :  - No acute events overnight.  - Pt seen and examined at bedside.     Additional ROS (if any):    MEDICATIONS  (STANDING):  amLODIPine   Tablet 10 milliGRAM(s) Oral daily  apixaban 5 milliGRAM(s) Oral every 12 hours  bethanechol 25 milliGRAM(s) Oral three times a day  carbidopa/levodopa  25/100 1 Tablet(s) Oral three times a day  chlorhexidine 0.12% Liquid 15 milliLiter(s) Oral Mucosa every 12 hours  chlorhexidine 4% Liquid 1 Application(s) Topical <User Schedule>  collagenase Ointment 1 Application(s) Topical daily  dexMEDEtomidine Infusion 0.2 MICROgram(s)/kG/Hr (3.06 mL/Hr) IV Continuous <Continuous>  fentaNYL   Infusion... 1 MICROgram(s)/kG/Hr (3.06 mL/Hr) IV Continuous <Continuous>  furosemide    Tablet 40 milliGRAM(s) Oral daily  levothyroxine 100 MICROGram(s) Oral daily  losartan 50 milliGRAM(s) Oral daily  metoprolol succinate ER 50 milliGRAM(s) Oral daily  mometasone 220 MICROgram(s) Inhaler 1 Puff(s) Inhalation daily  piperacillin/tazobactam IVPB. 3.375 Gram(s) IV Intermittent once  piperacillin/tazobactam IVPB.. 3.375 Gram(s) IV Intermittent every 8 hours  vancomycin  IVPB 1000 milliGRAM(s) IV Intermittent every 12 hours    MEDICATIONS  (PRN):      CAPILLARY BLOOD GLUCOSE      POCT Blood Glucose.: 132 mg/dL (2022 00:32)    I&O's Summary    2022 07:01  -  2022 07:00  --------------------------------------------------------  IN: 28.5 mL / OUT: 0 mL / NET: 28.5 mL        PHYSICAL EXAM:  Vital Signs Last 24 Hrs  T(C): 36.7 (2022 06:25), Max: 36.7 (2022 06:25)  T(F): 98.1 (2022 06:25), Max: 98.1 (2022 06:25)  HR: 61 (2022 07:00) (50 - 75)  BP: 106/56 (2022 07:00) (98/84 - 185/74)  BP(mean): 76 (2022 07:00) (59 - 105)  RR: 24 (2022 07:00) (18 - 27)  SpO2: 100% (2022 07:00) (92% - 100%)    Parameters below as of 2022 06:25  Patient On (Oxygen Delivery Method): ventilator    O2 Concentration (%): 50    General: NAD, non-toxic appearing   HEENT: PERRLA, EOMi, no scleral icterus  CV: RRR, normal S1 and S2, no m/r/g  Lungs: normal respiratory effort. CTAB, no wheezes, rales, or rhonchi  Abd: soft, nontender, nondistended  Ext: no edema, 2+ peripheral pulses   Pysch: AAOx3, appropriate affect   Neuro: grossly non-focal, moving all extremities spontaneously   Skin: no rashes or lesions     LABS:                          11.4   8.39  )-----------( 254      ( 2022 01:36 )             38.1       WBC Trend: 8.39<--  Hb Trend: 11.4<--    07-09    139  |  95<L>  |  34<H>  ----------------------------<  120<H>  5.0   |  32<H>  |  0.71    Ca    8.8      2022 01:36    TPro  7.4  /  Alb  4.2  /  TBili  0.2  /  DBili  x   /  AST  16  /  ALT  <5<L>  /  AlkPhos  67  07-09    PT/INR - ( 2022 01:36 )   PT: 12.0 sec;   INR: 1.04 ratio         PTT - ( 2022 01:36 )  PTT:32.3 sec      Urinalysis Basic - ( 2022 01:36 )    Color: Light Yellow / Appearance: Slightly Turbid / S.006 / pH: x  Gluc: x / Ketone: Negative  / Bili: Negative / Urobili: Negative   Blood: x / Protein: Trace / Nitrite: Negative   Leuk Esterase: Large / RBC: 7 /hpf / WBC 14 /HPF   Sq Epi: x / Non Sq Epi: 8 /hpf / Bacteria: Moderate            RADIOLOGY & ADDITIONAL TESTS: Reviewed Progress Note    22    Subjective / Overnight Events :  - No acute events overnight.  - Pt seen and examined at bedside.     Additional ROS (if any):    MEDICATIONS  (STANDING):  amLODIPine   Tablet 10 milliGRAM(s) Oral daily  apixaban 5 milliGRAM(s) Oral every 12 hours  bethanechol 25 milliGRAM(s) Oral three times a day  carbidopa/levodopa  25/100 1 Tablet(s) Oral three times a day  chlorhexidine 0.12% Liquid 15 milliLiter(s) Oral Mucosa every 12 hours  chlorhexidine 4% Liquid 1 Application(s) Topical <User Schedule>  collagenase Ointment 1 Application(s) Topical daily  dexMEDEtomidine Infusion 0.2 MICROgram(s)/kG/Hr (3.06 mL/Hr) IV Continuous <Continuous>  fentaNYL   Infusion... 1 MICROgram(s)/kG/Hr (3.06 mL/Hr) IV Continuous <Continuous>  furosemide    Tablet 40 milliGRAM(s) Oral daily  levothyroxine 100 MICROGram(s) Oral daily  losartan 50 milliGRAM(s) Oral daily  metoprolol succinate ER 50 milliGRAM(s) Oral daily  mometasone 220 MICROgram(s) Inhaler 1 Puff(s) Inhalation daily  piperacillin/tazobactam IVPB. 3.375 Gram(s) IV Intermittent once  piperacillin/tazobactam IVPB.. 3.375 Gram(s) IV Intermittent every 8 hours  vancomycin  IVPB 1000 milliGRAM(s) IV Intermittent every 12 hours    MEDICATIONS  (PRN):      CAPILLARY BLOOD GLUCOSE      POCT Blood Glucose.: 132 mg/dL (2022 00:32)    I&O's Summary    2022 07:01  -  2022 07:00  --------------------------------------------------------  IN: 28.5 mL / OUT: 0 mL / NET: 28.5 mL        PHYSICAL EXAM:  Vital Signs Last 24 Hrs  T(C): 36.7 (2022 06:25), Max: 36.7 (2022 06:25)  T(F): 98.1 (2022 06:25), Max: 98.1 (2022 06:25)  HR: 61 (2022 07:00) (50 - 75)  BP: 106/56 (2022 07:00) (98/84 - 185/74)  BP(mean): 76 (2022 07:00) (59 - 105)  RR: 24 (2022 07:00) (18 - 27)  SpO2: 100% (2022 07:00) (92% - 100%)    Parameters below as of 2022 06:25  Patient On (Oxygen Delivery Method): ventilator    O2 Concentration (%): 50    General: Patient is intubated. No apparent distress.   HEENT: PERRLA, EOMi, no scleral icterus  CV: RRR, normal S1 and S2, no m/r/g  Lungs: normal respiratory effort. CTAB, no wheezes, rales, or rhonchi  Abd: soft, nontender, nondistended  Ext: no edema, 2+ peripheral pulses   Pysch: Intubated.  Neuro: grossly non-focal, moving all extremities spontaneously   Skin: no rashes or lesions     LABS:                          11.4   8.39  )-----------( 254      ( 2022 01:36 )             38.1       WBC Trend: 8.39<--  Hb Trend: 11.4<--        139  |  95<L>  |  34<H>  ----------------------------<  120<H>  5.0   |  32<H>  |  0.71    Ca    8.8      2022 01:36    TPro  7.4  /  Alb  4.2  /  TBili  0.2  /  DBili  x   /  AST  16  /  ALT  <5<L>  /  AlkPhos  67  07-09    PT/INR - ( 2022 01:36 )   PT: 12.0 sec;   INR: 1.04 ratio         PTT - ( 2022 01:36 )  PTT:32.3 sec      Urinalysis Basic - ( 2022 01:36 )    Color: Light Yellow / Appearance: Slightly Turbid / S.006 / pH: x  Gluc: x / Ketone: Negative  / Bili: Negative / Urobili: Negative   Blood: x / Protein: Trace / Nitrite: Negative   Leuk Esterase: Large / RBC: 7 /hpf / WBC 14 /HPF   Sq Epi: x / Non Sq Epi: 8 /hpf / Bacteria: Moderate            RADIOLOGY & ADDITIONAL TESTS: Reviewed

## 2022-07-09 NOTE — ED PROVIDER NOTE - PHYSICAL EXAMINATION
Gen: Alert, oriented x 0.   HEENT: Atraumatic. Mucous membranes dry, no scleral icterus.  CV: roby. No significant lower extremity edema.   Resp: Respirations unlabored. Crackles and wheezing bilat.   GI: Abdomen non tender to palpation, soft and non-distended.   Skin/MSK: No open wounds. No ecchymosis appreciated.  Neuro: Moving extremities x 4 spontaneously. EOMI. Pupils reactive to light bilat, L = 5mm, R = 3 mm (unclear if new finding).   Psych: Appropriate mood, cooperative

## 2022-07-09 NOTE — ED ADULT NURSE REASSESSMENT NOTE - NS ED NURSE REASSESS COMMENT FT1
04:40 10 etomidate IVP  04:41 100 rocuronium  04:42 7.5F, 25 lip line, +color change end tidal, bilat breath sounds  04:51 OG tube placed  4:58 1mcg fentanyl started

## 2022-07-09 NOTE — ED PROVIDER NOTE - NS ED ATTENDING STATEMENT MOD
IJ   I have personally provided the amount of critical care time documented below concurrently with the resident/fellow.  This time excludes time spent on separate procedures and time spent teaching. I have reviewed the resident’s / fellow’s documentation and I agree with the history, exam, and assessment and plan of care.

## 2022-07-09 NOTE — H&P ADULT - ATTENDING COMMENTS
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   - Hypoxic Hypercarbic Respiratory Failure on Vent Support   - Sedation on IV Fentanyl Gtt will add and switch with IV Precedex    - [place]ically [place] with borderline BP   - Septic W/U for Aspiration PNA, UA +ve UTI, and Stage IV Decubital Wound   - Empiric ABx Coverage pending C& S   - OGT and Enteral Feed as tolerated   - Reconcile home medications   - Monitor I&O, renal function with Ball   - DVT PPx with Lovenox and SCD   - GOC - Full Code - till family clarification     Patient seen and examined with ICU Resident/Fellow at bedside after lab data, medical records and radiology reports reviewed. I have read and agreeable in general with resident's Documented Note, Assessment and Management Plan which reflected my opinions from bedside round and discussion.   Total Critical Care Time = 45 Min excluding teaching and procedure activity.

## 2022-07-09 NOTE — ED PROVIDER NOTE - PROGRESS NOTE DETAILS
Attending (Dustni Diallo D.O.):  pco2 100, ph 7.19, suspect acute hypoxemic and hypercapnic resp failure 2/2 aspiration PNA. Bipap called for. Patient mentating, protecting airway at this time. Attending (Dustin Diallo D.O.):  Despite avaps, patient pulling Vt 400s, rate 24, worsening acidosis and hypercapnea. No proxy reachable.  Unclear code status per charts. Given this, decision to intubate patient for acute hypercapnic and hypoxic resp failure Attending (Dustin Diallo D.O.):  Patient intubated. MICU consulted for acute hypoxic and hypercapnic resp failure

## 2022-07-09 NOTE — CONSULT NOTE ADULT - SUBJECTIVE AND OBJECTIVE BOX
89-Year-old Female PMH Dementia, Parkinson's Disease, Dysphagia, DVT, Muscle Weakness, Gait Instability, Pressure Ulcer, DVT, HTN, HYPOTHYROIDISM, DEPRESSION, PARKINSONS, MOOD DISORDER, DEMENTIA, DECUBITUS ULCERS, SEPSIS, REFLUX, INSOMNIA, CONSTIPATION, HLD who was found hypoxic in SNF last night, she was treated with one dose of IM lasix and IV Zosyn, O2 initially improved but then dropped again to 80s with respiratory distress. She was transferred to [facility] for more evaluation due to acute respiratory failure. She was found with Co2 retention and acidosis. She was started on BIPAP and IV broad abx therapy and was admitted in MICU.       INTERVAL HPI/OVERNIGHT EVENTS: intubated     Pain Location & Control: ok     MEDICATIONS  (STANDING):  apixaban 5 milliGRAM(s) Oral every 12 hours  bethanechol 25 milliGRAM(s) Oral three times a day  carbidopa/levodopa  25/100 1 Tablet(s) Oral three times a day  chlorhexidine 0.12% Liquid 15 milliLiter(s) Oral Mucosa every 12 hours  chlorhexidine 4% Liquid 1 Application(s) Topical <User Schedule>  collagenase Ointment 1 Application(s) Topical daily  dexMEDEtomidine Infusion 0.2 MICROgram(s)/kG/Hr (3.06 mL/Hr) IV Continuous <Continuous>  fentaNYL   Infusion... 1 MICROgram(s)/kG/Hr (3.06 mL/Hr) IV Continuous <Continuous>  furosemide   Injectable 40 milliGRAM(s) IV Push daily  levothyroxine 100 MICROGram(s) Oral daily  metoprolol succinate ER 50 milliGRAM(s) Oral daily  piperacillin/tazobactam IVPB.. 3.375 Gram(s) IV Intermittent every 8 hours  vancomycin  IVPB 1000 milliGRAM(s) IV Intermittent every 12 hours    MEDICATIONS  (PRN):      Allergies    No Known Allergies    Intolerances        REVIEW OF SYSTEMS:  UTO, sedated     Vital Signs Last 24 Hrs  T(C): 36.7 (2022 06:25), Max: 36.7 (2022 06:25)  T(F): 98.1 (2022 06:25), Max: 98.1 (2022 06:25)  HR: 57 (2022 13:00) (50 - 75)  BP: 150/67 (2022 13:00) (98/84 - 185/74)  BP(mean): 96 (2022 13:00) (59 - 105)  RR: 24 (2022 13:00) (18 - 27)  SpO2: 92% (2022 13:00) (85% - 100%)    Parameters below as of 2022 06:25  Patient On (Oxygen Delivery Method): ventilator    O2 Concentration (%): 50    PHYSICAL EXAM:  GENERAL: NAD, well-groomed, well-developed  HEAD:  Atraumatic, Normocephalic  EYES: EOMI, PERRLA, conjunctiva and sclera clear  ENMT: No tonsillar erythema, exudates, or enlargement; Moist mucous membranes, Good dentition, No lesions  NECK: Supple, No JVD, Normal thyroid  NERVOUS SYSTEM:  sedated   CHEST/LUNG: Clear to auscultation bilaterally; No rales, rhonchi, wheezing, or rubs  HEART: Regular rate and rhythm; No murmurs, rubs, or gallops  ABDOMEN: Soft, Nontender, Nondistended; Bowel sounds present  EXTREMITIES:  2+ Peripheral Pulses, No clubbing or cyanosis  LYMPH: No lymphadenopathy noted  SKIN: No rashes or lesions  INCISION:  sacral DU     LABS:                        11.4   8.39  )-----------( 254      ( 2022 01:36 )             38.1     2022 01:36    139    |  95     |  34     ----------------------------<  120    5.0     |  32     |  0.71     Ca    8.8        2022 01:36    TPro  7.4    /  Alb  4.2    /  TBili  0.2    /  DBili  x      /  AST  16     /  ALT  <5     /  AlkPhos  67     2022 01:36    PT/INR - ( 2022 01:36 )   PT: 12.0 sec;   INR: 1.04 ratio         PTT - ( 2022 01:36 )  PTT:32.3 sec  Urinalysis Basic - ( 2022 01:36 )    Color: Light Yellow / Appearance: Slightly Turbid / S.006 / pH: x  Gluc: x / Ketone: Negative  / Bili: Negative / Urobili: Negative   Blood: x / Protein: Trace / Nitrite: Negative   Leuk Esterase: Large / RBC: 7 /hpf / WBC 14 /HPF   Sq Epi: x / Non Sq Epi: 8 /hpf / Bacteria: Moderate      CAPILLARY BLOOD GLUCOSE      POCT Blood Glucose.: 132 mg/dL (2022 00:32)        Cultures      RADIOLOGY & ADDITIONAL TESTS:    Imaging Personally Reviewed:  [ x] YES  [ ] NO    Consultant(s) Notes Reviewed:  [ x] YES  [ ] NO    Care Discussed with Consultants/Other Providers [x ] YES  [ ] NO

## 2022-07-09 NOTE — CONSULT NOTE ADULT - ASSESSMENT
89-Year-old Female PMH Dementia, Parkinson's Disease, Dysphagia, DVT, Muscle Weakness, Gait Instability, Pressure Ulcer, DVT, HTN, HYPOTHYROIDISM, DEPRESSION, PARKINSONS, MOOD DISORDER, DEMENTIA, DECUBITUS ULCERS, SEPSIS, REFLUX, INSOMNIA, CONSTIPATION, HLD who was found hypoxic in SNF last night, she was treated with one dose of IM lasix and IV Zosyn, O2 initially improved but then dropped again to 80s with respiratory distress. She was transferred to [facility] for more evaluation due to acute respiratory failure. She was found with Co2 retention and acidosis. She was started on BIPAP and IV broad abx therapy and was admitted in MICU.     Acute hypercapnic / hypoxic respiratory failure - s/p intubation on AC, sedated with Fentanyl transition to Precedex  f/u with icu as primary team.   Aspiration pneumonia - on IV Vancomycin and Zosyn.   depression/anxiety- at home was on  Escitalopram 10 mg. F/u psych   weight loss- improved on puree diet with supplementary diet   sacral wound stage 4- Continue Collagen topical powder and calcium alginate with zinc oxide to periwound. F/u wound care team. Offload pressure.  HTN- home meds as Losartan to 75 mg and Amlodipine 10 mg on hold,  Continue Toprol 50 mg  HLD - home meds as Simvastatin 20 mg on hold   urinary retention - home meds as  urecholine TID on hold   DVT ppx- Eliquis 5 mg BID  Parkinson's - Sinemet 25/100 mg TID  Hypothyroidism- Synthroid to 100 mcg.

## 2022-07-09 NOTE — ED PROVIDER NOTE - OBJECTIVE STATEMENT
89 yo F PMHx of Parkinson's disease, HTN, DVT on Eliquis, hypothyroidism, presenting to ED for worsening work of breathing, hypoxia, and concern for pna on outpt xray. Pt unable to provide significant hx. Unknown baseline mental status.

## 2022-07-09 NOTE — H&P ADULT - NSHPLABSRESULTS_GEN_ALL_CORE
LABS:                          11.4   8.39  )-----------( 254      ( 2022 01:36 )             38.1         139  |  95<L>  |  34<H>  ----------------------------<  120<H>  5.0   |  32<H>  |  0.71    Ca    8.8      2022 01:36    TPro  7.4  /  Alb  4.2  /  TBili  0.2  /  DBili  x   /  AST  16  /  ALT  <5<L>  /  AlkPhos  67  07-    LIVER FUNCTIONS - ( 2022 01:36 )  Alb: 4.2 g/dL / Pro: 7.4 g/dL / ALK PHOS: 67 U/L / ALT: <5 U/L / AST: 16 U/L / GGT: x           PT/INR - ( 2022 01:36 )   PT: 12.0 sec;   INR: 1.04 ratio         PTT - ( 2022 01:36 )  PTT:32.3 sec  Urinalysis Basic - ( 2022 01:36 )    Color: Light Yellow / Appearance: Slightly Turbid / S.006 / pH: x  Gluc: x / Ketone: Negative  / Bili: Negative / Urobili: Negative   Blood: x / Protein: Trace / Nitrite: Negative   Leuk Esterase: Large / RBC: 7 /hpf / WBC 14 /HPF   Sq Epi: x / Non Sq Epi: 8 /hpf / Bacteria: Moderate    < from: Xray Chest 1 View- PORTABLE-Urgent (Xray Chest 1 View- PORTABLE-Urgent .) (22 @ 05:07) >    FINDINGS:    The endotracheal tube tip is just above the iman.  The enteric tube tip is in the stomach.  Unchanged left sided predominant airspace opacities.  No pleural effusion or pneumothorax.  There is limited evaluation of the heart size and mediastinum on portable   technique.  No acute abnormality within visible osseous structures.      IMPRESSION:    The endotracheal tube tip is just above the iman.  The enteric tube tip is in the stomach.

## 2022-07-09 NOTE — H&P ADULT - HISTORY OF PRESENT ILLNESS
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 Limited history, obtained through paperwork from nursing home. Patient was suspected to have bilateral pneumonia based on chest X-ray at nursing home yesterday 7/8. On initial presentation patient was afebrile, had non labored breathing on 6L nasal canula, and was AOx1. In the ED, patient was found to be hypercapnic with a PCO2 of 104 and pH of 7.19 off a VBG. Patient was placed on avaps; however she continued to be hypercapnic and acidotic with repeat VBG showing PCO2 of 117 and pH of 7.09. At this time, patient was intubated.

## 2022-07-09 NOTE — ED PROVIDER NOTE - CLINICAL SUMMARY MEDICAL DECISION MAKING FREE TEXT BOX
89 yo F PMHx of Parkinson's disease, HTN, DVT on Eliquis, hypothyroidism, presenting to ED for worsening work of breathing, hypoxia, and concern for pna on outpt xray. Pt unable to provide significant hx. Unknown baseline mental status. Exam as above. Pt hypoxic. Wheezing an crackles on exam. Consider aspiration pneumonitis, pna viral vs bacterial, CHF, less likely ACS. Consider medication side effect, electrolyte abnormality, [place]. Improved on 6 L NC O2. Labs, ecg, CXR, CT head, will reassess.

## 2022-07-10 LAB
ALBUMIN SERPL ELPH-MCNC: 3.5 G/DL — SIGNIFICANT CHANGE UP (ref 3.3–5)
ALP SERPL-CCNC: 60 U/L — SIGNIFICANT CHANGE UP (ref 40–120)
ALT FLD-CCNC: <5 U/L — LOW (ref 10–45)
ANION GAP SERPL CALC-SCNC: 14 MMOL/L — SIGNIFICANT CHANGE UP (ref 5–17)
APTT BLD: 27.9 SEC — SIGNIFICANT CHANGE UP (ref 27.5–35.5)
AST SERPL-CCNC: 15 U/L — SIGNIFICANT CHANGE UP (ref 10–40)
BASE EXCESS BLDA CALC-SCNC: 10.6 MMOL/L — HIGH (ref -2–3)
BASE EXCESS BLDV CALC-SCNC: 11.8 MMOL/L — HIGH (ref -2–2)
BILIRUB SERPL-MCNC: 0.8 MG/DL — SIGNIFICANT CHANGE UP (ref 0.2–1.2)
BUN SERPL-MCNC: 38 MG/DL — HIGH (ref 7–23)
CA-I SERPL-SCNC: 1.09 MMOL/L — LOW (ref 1.15–1.33)
CALCIUM SERPL-MCNC: 8.7 MG/DL — SIGNIFICANT CHANGE UP (ref 8.4–10.5)
CHLORIDE BLDV-SCNC: 97 MMOL/L — SIGNIFICANT CHANGE UP (ref 96–108)
CHLORIDE SERPL-SCNC: 96 MMOL/L — SIGNIFICANT CHANGE UP (ref 96–108)
CO2 BLDA-SCNC: 32 MMOL/L — HIGH (ref 19–24)
CO2 BLDV-SCNC: 36 MMOL/L — HIGH (ref 22–26)
CO2 SERPL-SCNC: 29 MMOL/L — SIGNIFICANT CHANGE UP (ref 22–31)
CREAT SERPL-MCNC: 0.87 MG/DL — SIGNIFICANT CHANGE UP (ref 0.5–1.3)
EGFR: 63 ML/MIN/1.73M2 — SIGNIFICANT CHANGE UP
GAS PNL BLDA: SIGNIFICANT CHANGE UP
GAS PNL BLDV: 133 MMOL/L — LOW (ref 136–145)
GAS PNL BLDV: SIGNIFICANT CHANGE UP
GLUCOSE BLDC GLUCOMTR-MCNC: 107 MG/DL — HIGH (ref 70–99)
GLUCOSE BLDC GLUCOMTR-MCNC: 116 MG/DL — HIGH (ref 70–99)
GLUCOSE BLDV-MCNC: 107 MG/DL — HIGH (ref 70–99)
GLUCOSE SERPL-MCNC: 110 MG/DL — HIGH (ref 70–99)
HCO3 BLDA-SCNC: 32 MMOL/L — HIGH (ref 21–28)
HCO3 BLDV-SCNC: 35 MMOL/L — HIGH (ref 22–29)
HCT VFR BLD CALC: 33.4 % — LOW (ref 34.5–45)
HCT VFR BLDA CALC: 35 % — SIGNIFICANT CHANGE UP (ref 34.5–46.5)
HGB BLD CALC-MCNC: 11.7 G/DL — SIGNIFICANT CHANGE UP (ref 11.7–16.1)
HGB BLD-MCNC: 11 G/DL — LOW (ref 11.5–15.5)
HOROWITZ INDEX BLDA+IHG-RTO: 50 — SIGNIFICANT CHANGE UP
HOROWITZ INDEX BLDV+IHG-RTO: 50 — SIGNIFICANT CHANGE UP
INR BLD: 1.21 RATIO — HIGH (ref 0.88–1.16)
INTUBATED: SIGNIFICANT CHANGE UP
LACTATE BLDV-MCNC: 1.4 MMOL/L — SIGNIFICANT CHANGE UP (ref 0.7–2)
LACTATE SERPL-SCNC: 2.6 MMOL/L — HIGH (ref 0.7–2)
MAGNESIUM SERPL-MCNC: 2.2 MG/DL — SIGNIFICANT CHANGE UP (ref 1.6–2.6)
MCHC RBC-ENTMCNC: 29.8 PG — SIGNIFICANT CHANGE UP (ref 27–34)
MCHC RBC-ENTMCNC: 32.9 GM/DL — SIGNIFICANT CHANGE UP (ref 32–36)
MCV RBC AUTO: 90.5 FL — SIGNIFICANT CHANGE UP (ref 80–100)
NRBC # BLD: 0 /100 WBCS — SIGNIFICANT CHANGE UP (ref 0–0)
PCO2 BLDA: 30 MMHG — LOW (ref 32–45)
PCO2 BLDV: 38 MMHG — LOW (ref 39–42)
PH BLDA: 7.63 — CRITICAL HIGH (ref 7.35–7.45)
PH BLDV: 7.57 — HIGH (ref 7.32–7.43)
PHOSPHATE SERPL-MCNC: 2.7 MG/DL — SIGNIFICANT CHANGE UP (ref 2.5–4.5)
PLATELET # BLD AUTO: 217 K/UL — SIGNIFICANT CHANGE UP (ref 150–400)
PO2 BLDA: 77 MMHG — LOW (ref 83–108)
PO2 BLDV: 39 MMHG — SIGNIFICANT CHANGE UP (ref 25–45)
POTASSIUM BLDV-SCNC: 3.7 MMOL/L — SIGNIFICANT CHANGE UP (ref 3.5–5.1)
POTASSIUM SERPL-MCNC: 3.9 MMOL/L — SIGNIFICANT CHANGE UP (ref 3.5–5.3)
POTASSIUM SERPL-SCNC: 3.9 MMOL/L — SIGNIFICANT CHANGE UP (ref 3.5–5.3)
PROT SERPL-MCNC: 6.6 G/DL — SIGNIFICANT CHANGE UP (ref 6–8.3)
PROTHROM AB SERPL-ACNC: 14.1 SEC — HIGH (ref 10.5–13.4)
RBC # BLD: 3.69 M/UL — LOW (ref 3.8–5.2)
RBC # FLD: 15.1 % — HIGH (ref 10.3–14.5)
SAO2 % BLDA: 97.9 % — SIGNIFICANT CHANGE UP (ref 94–98)
SAO2 % BLDV: 73.1 % — SIGNIFICANT CHANGE UP (ref 67–88)
SODIUM SERPL-SCNC: 139 MMOL/L — SIGNIFICANT CHANGE UP (ref 135–145)
VANCOMYCIN TROUGH SERPL-MCNC: 24.4 UG/ML — HIGH (ref 10–20)
WBC # BLD: 10.22 K/UL — SIGNIFICANT CHANGE UP (ref 3.8–10.5)
WBC # FLD AUTO: 10.22 K/UL — SIGNIFICANT CHANGE UP (ref 3.8–10.5)

## 2022-07-10 PROCEDURE — 93306 TTE W/DOPPLER COMPLETE: CPT | Mod: 26

## 2022-07-10 PROCEDURE — 99291 CRITICAL CARE FIRST HOUR: CPT | Mod: 25

## 2022-07-10 RX ORDER — FUROSEMIDE 40 MG
40 TABLET ORAL ONCE
Refills: 0 | Status: COMPLETED | OUTPATIENT
Start: 2022-07-10 | End: 2022-07-10

## 2022-07-10 RX ORDER — FUROSEMIDE 40 MG
40 TABLET ORAL ONCE
Refills: 0 | Status: DISCONTINUED | OUTPATIENT
Start: 2022-07-10 | End: 2022-07-10

## 2022-07-10 RX ORDER — PROPOFOL 10 MG/ML
10 INJECTION, EMULSION INTRAVENOUS
Qty: 500 | Refills: 0 | Status: DISCONTINUED | OUTPATIENT
Start: 2022-07-10 | End: 2022-07-12

## 2022-07-10 RX ADMIN — CARBIDOPA AND LEVODOPA 1 TABLET(S): 25; 100 TABLET ORAL at 21:22

## 2022-07-10 RX ADMIN — Medication 650 MILLIGRAM(S): at 21:23

## 2022-07-10 RX ADMIN — Medication 1 APPLICATION(S): at 11:50

## 2022-07-10 RX ADMIN — Medication 100 MICROGRAM(S): at 05:43

## 2022-07-10 RX ADMIN — PIPERACILLIN AND TAZOBACTAM 25 GRAM(S): 4; .5 INJECTION, POWDER, LYOPHILIZED, FOR SOLUTION INTRAVENOUS at 00:29

## 2022-07-10 RX ADMIN — CARBIDOPA AND LEVODOPA 1 TABLET(S): 25; 100 TABLET ORAL at 13:50

## 2022-07-10 RX ADMIN — Medication 25 MILLIGRAM(S): at 13:51

## 2022-07-10 RX ADMIN — Medication 25 MILLIGRAM(S): at 21:37

## 2022-07-10 RX ADMIN — DEXMEDETOMIDINE HYDROCHLORIDE IN 0.9% SODIUM CHLORIDE 3.06 MICROGRAM(S)/KG/HR: 4 INJECTION INTRAVENOUS at 04:00

## 2022-07-10 RX ADMIN — Medication 650 MILLIGRAM(S): at 22:23

## 2022-07-10 RX ADMIN — PIPERACILLIN AND TAZOBACTAM 25 GRAM(S): 4; .5 INJECTION, POWDER, LYOPHILIZED, FOR SOLUTION INTRAVENOUS at 13:51

## 2022-07-10 RX ADMIN — Medication 40 MILLIGRAM(S): at 06:00

## 2022-07-10 RX ADMIN — APIXABAN 5 MILLIGRAM(S): 2.5 TABLET, FILM COATED ORAL at 17:24

## 2022-07-10 RX ADMIN — CHLORHEXIDINE GLUCONATE 15 MILLILITER(S): 213 SOLUTION TOPICAL at 05:41

## 2022-07-10 RX ADMIN — PROPOFOL 3.61 MICROGRAM(S)/KG/MIN: 10 INJECTION, EMULSION INTRAVENOUS at 11:00

## 2022-07-10 RX ADMIN — LOSARTAN POTASSIUM 50 MILLIGRAM(S): 100 TABLET, FILM COATED ORAL at 05:42

## 2022-07-10 RX ADMIN — PIPERACILLIN AND TAZOBACTAM 25 GRAM(S): 4; .5 INJECTION, POWDER, LYOPHILIZED, FOR SOLUTION INTRAVENOUS at 21:22

## 2022-07-10 RX ADMIN — PROPOFOL 3.61 MICROGRAM(S)/KG/MIN: 10 INJECTION, EMULSION INTRAVENOUS at 19:00

## 2022-07-10 RX ADMIN — CARBIDOPA AND LEVODOPA 1 TABLET(S): 25; 100 TABLET ORAL at 05:41

## 2022-07-10 RX ADMIN — CHLORHEXIDINE GLUCONATE 15 MILLILITER(S): 213 SOLUTION TOPICAL at 17:24

## 2022-07-10 RX ADMIN — Medication 250 MILLIGRAM(S): at 05:42

## 2022-07-10 RX ADMIN — AMLODIPINE BESYLATE 10 MILLIGRAM(S): 2.5 TABLET ORAL at 05:40

## 2022-07-10 RX ADMIN — APIXABAN 5 MILLIGRAM(S): 2.5 TABLET, FILM COATED ORAL at 05:40

## 2022-07-10 RX ADMIN — Medication 40 MILLIGRAM(S): at 18:29

## 2022-07-10 RX ADMIN — Medication 25 MILLIGRAM(S): at 05:40

## 2022-07-10 RX ADMIN — CHLORHEXIDINE GLUCONATE 1 APPLICATION(S): 213 SOLUTION TOPICAL at 05:41

## 2022-07-10 RX ADMIN — DEXMEDETOMIDINE HYDROCHLORIDE IN 0.9% SODIUM CHLORIDE 3.06 MICROGRAM(S)/KG/HR: 4 INJECTION INTRAVENOUS at 02:16

## 2022-07-10 NOTE — PROGRESS NOTE ADULT - ASSESSMENT
89 year old female with PMH of Parkinson's disease, HTN, chronic DVT on Eliquis, and hypothyroidism  who was admitted for acute hypoxic respiratory failure most likely 2/2 PNA, currently intubated and treated with zosyn and vancomycin.     Neuro:  - AOx1 on presentation, unknown baseline   - Sedated with fentanyl and precedex  - unequal pupils noted on exam  - CT head with no acute intracranial hemorrhage, hydrocephalus or extra-axial fluid collection. mild parenchymal volume loss and mild chronic microvascular ischemic changes    #Parkinson  -continue carbidopa/levodopa via OG tube     Cardiovascular:  #Heart-failure?  - BNP 3075  - ordered echo, will f/u  - 40mg IV lasix daily, monitor I/Os    #Chronic DVT  - CT head negative  - apixaban 5mg q12hrs    #Hypertension  - c/w home amlodipine     Pulmonary:  #Acute Hypoxic Respiratory Failure   - Intubated, Vent (A/C) Volume: 400 Rate: 24 FiO2: 50 PEEP: 5  - will pull back ET tube by 2cm and do repeat CXR  - ABG in the morning     #Pneumonia  - c/w Zosyn and Vancomycin (has decubitus ulcer, stage 4 on back)  - Consider bronchoscopy for mucus plug   - follow up sputum cultures   #COPD?  - Uses fluticasone and Duoneb at nursing home, will continue    GI/Nutrition:  - No active issues   - trickle feeds via OG tube     Renal:  - Creatinine 0.73  - Monitor urine output, foster  - 40 IV lasix now, will watch for response   - f/u Cr, CMP daily    ID:  - Started Zosyn for potential aspiration pneumonia and uti (+U/A)   - Started Vancomycin due to decubitals ulcer  - Nasal Swab for MRSA ordered, will f/u  - F/u blood, urine, and sputum cultures     Heme:   #anemia  - Hemoglobin 11.4, unclear baseline. No active bleeding   - CBC daily     Endocrine:  #Hypothyroidism  - will continue home med, Synthroid     Skin  #decubitus ulcer, stage 4   - wound care consult, will f/u    Ethics:  - FULL CODE pending clarification with guardian. Unable to reach on Friday overnight and Saturday.     DVT: Eliquis                  89 year old female with PMH of Parkinson's disease, HTN, chronic DVT on Eliquis, and hypothyroidism  who was admitted for acute hypoxic respiratory failure most likely 2/2 PNA, currently intubated and treated with zosyn and vancomycin.     Neuro:  - AOx1 on presentation, unknown baseline   - Sedated with propofol and fentanyl PRN  - unequal pupils on exam since admission  - CT head with no acute intracranial hemorrhage, hydrocephalus or extra-axial fluid collection. mild parenchymal volume loss and mild chronic microvascular ischemic changes    #Parkinson  -continue carbidopa/levodopa via OG tube     Cardiovascular:  #Heart-failure? / fluid overloaded  - BNP 3075  - echo with EF of 55% and mild aortic regurgitation  - 40mg IV lasix daily, today give additional lasix dose to increase UO with goal of 1 L.  - monitor I/Os    #Chronic DVT  - CT head negative  - apixaban 5mg q12hrs    #Hypertension  - c/w home amlodipine     Pulmonary:  #Acute Hypoxic Respiratory Failure   - Intubated, Vent (A/C) Volume: 400 Rate: 24 FiO2: 50 PEEP: 5  - repeat ABG with pH of 7.57, improved pCO2 of 42 and HCO3 of 34    #Pneumonia  - c/w Zosyn and Vancomycin (has decubitus ulcer, stage 4 on back)  - Consider bronchoscopy for mucus plug   - sputum cultures NGTD    #COPD?  - Uses fluticasone and Duoneb at nursing home, will continue    GI/Nutrition:  - No active issues   - tube feeds via OG tube     Renal:  - Creatinine 0.87  - Monitor urine output, foster  - 40 IV lasix   - f/u Cr, CMP daily    ID:  - c/w Zosyn for potential aspiration pneumonia and uti (+U/A)   - c/w Vancomycin due to decubitals ulcer  - Nasal Swab for MRSA ordered, will f/u  - F/u blood, urine, and sputum cultures     Heme:   #anemia  - Hemoglobin 11.4, unclear baseline. No active bleeding   - CBC daily     Endocrine:  #Hypothyroidism  - will continue home med, Synthroid     Skin  #decubitus ulcer, stage 4   - wound care consult, will f/u    Ethics:  - FULL CODE pending clarification with guardian. Unable to reach on Friday overnight and Saturday. Spoke with family friend who said will reach out to the guardian.     DVT: Eliquis

## 2022-07-10 NOTE — PATIENT PROFILE ADULT - PATIENT'S SEXUAL ORIENTATION
CT Stone  Protocol: 6/1/2019 5:18 PM



HISTORY: Right lower quadrant abdominal pain



COMPARISON: None.



Procedure: 



Multiple contiguous axial images were obtained and a CT of the abdomen and pelvis without IV contrast
. Coronal reformats were performed.



FINDINGS:

This examination is limited for the evaluation of solid organs and vascular structures due to the lac
k of intravenous contrast.



Lower Chest: within normal limits.



Abdomen:

Liver: within normal limits.

Bile Ducts: Normal caliber.

Gallbladder: No calcified gallstones. Normal caliber wall.

Pancreas: within normal limits.

Spleen: within normal limits.

Adrenals: within normal limits.

Kidneys: within normal limits.



Pelvis:

Reproductive Organs: No pelvic masses.

Ureters: within normal limits.

Bladder: within normal limits.



Bowel: Normal caliber. Normal caliber appendix

Mesenteric Lymph Nodes: No enlarged mesenteric lymph nodes.

Peritoneum: Stranding change is seen adjacent to the cecum. In the center of this stranding change is
 fat density most likely secondary to epiploic appendagitis. No free air or free fluid are seen.

Vessels: Normal caliber aorta 

Retroperitoneum: within normal limits.

Abdominal Wall: within normal limits.

Bones: Bilateral L5 pars defects  



IMPRESSION:



Stranding changes adjacent to the cecum is separate from the appendix and most likely secondary to ep
iploic appendagitis



Reported By: Chavo Ortez 

Electronically Signed:  6/1/2019 5:51 PM
unable to assess pt intubated/ no family

## 2022-07-10 NOTE — PATIENT PROFILE ADULT - BILL PAYMENT
OTC Prilosec       Please call central scheduling at [phone] Echo to be done in 3 months       All testing is completed at [address], On license of [facility] no

## 2022-07-10 NOTE — PATIENT PROFILE ADULT - FUNCTIONAL SCREEN CURRENT LEVEL: COMMUNICATION, MLM
unable to assess pt intubated/ no family unable to assess pt intubated/ no family/2 = difficulty understanding and speaking (not related to language barrier)

## 2022-07-10 NOTE — PROGRESS NOTE ADULT - SUBJECTIVE AND OBJECTIVE BOX
Subjective / Overnight Events :  - Pt received additional dose of lasix overnight.  - Pt seen and examined at bedside.     Additional ROS (if any):    MEDICATIONS  (STANDING):  amLODIPine   Tablet 10 milliGRAM(s) Oral daily  apixaban 5 milliGRAM(s) Oral every 12 hours  bethanechol 25 milliGRAM(s) Oral three times a day  carbidopa/levodopa  25/100 1 Tablet(s) Oral three times a day  chlorhexidine 0.12% Liquid 15 milliLiter(s) Oral Mucosa every 12 hours  chlorhexidine 4% Liquid 1 Application(s) Topical <User Schedule>  collagenase Ointment 1 Application(s) Topical daily  dexMEDEtomidine Infusion 0.2 MICROgram(s)/kG/Hr (3.06 mL/Hr) IV Continuous <Continuous>  fentaNYL   Infusion... 1 MICROgram(s)/kG/Hr (3.06 mL/Hr) IV Continuous <Continuous>  furosemide    Tablet 40 milliGRAM(s) Oral daily  levothyroxine 100 MICROGram(s) Oral daily  losartan 50 milliGRAM(s) Oral daily  metoprolol succinate ER 50 milliGRAM(s) Oral daily  mometasone 220 MICROgram(s) Inhaler 1 Puff(s) Inhalation daily  piperacillin/tazobactam IVPB. 3.375 Gram(s) IV Intermittent once  piperacillin/tazobactam IVPB.. 3.375 Gram(s) IV Intermittent every 8 hours  vancomycin  IVPB 1000 milliGRAM(s) IV Intermittent every 12 hours    MEDICATIONS  (PRN):      CAPILLARY BLOOD GLUCOSE  POCT  Blood Glucose (07.09.22 @ 00:32)    POCT Blood Glucose.: 132 mg/dL    I&O's Summary    09 Jul 2022 07:01  -  10 Jul 2022 07:00  --------------------------------------------------------  IN: 1113 mL / OUT: 1150 mL / NET: -37 mL      PHYSICAL EXAM:  ICU Vital Signs Last 24 Hrs  T(C): 37.4 (10 Jul 2022 06:00), Max: 38.7 (09 Jul 2022 19:00)  T(F): 99.3 (10 Jul 2022 06:00), Max: 101.7 (09 Jul 2022 19:00)  HR: 52 (10 Jul 2022 07:00) (52 - 64)  BP: 142/63 (10 Jul 2022 07:00) (110/56 - 192/79)  BP(mean): 90 (10 Jul 2022 07:00) (79 - 129)  ABP: --  ABP(mean): --  RR: 14 (10 Jul 2022 07:00) (14 - 26)  SpO2: 100% (10 Jul 2022 07:00) (85% - 100%)    O2 Parameters below as of 10 Jul 2022 04:00  Patient On (Oxygen Delivery Method): ventilator    O2 Concentration (%): 50      Parameters below as of 09 Jul 2022 06:25  Patient On (Oxygen Delivery Method): ventilator    O2 Concentration (%): 50    General: Patient is intubated. No apparent distress.   HEENT: PERRLA, EOMi, no scleral icterus  CV: RRR, normal S1 and S2, no m/r/g  Lungs: normal respiratory effort. CTAB, no wheezes, rales, or rhonchi  Abd: soft, nontender, nondistended  Ext: no edema, 2+ peripheral pulses   Pysch: Intubated.  Neuro: grossly non-focal, moving all extremities spontaneously   Skin: no rashes or lesions     LABS:    CBC Full  -  ( 10 Jul 2022 02:48 )  WBC Count : 10.22 K/uL  RBC Count : 3.69 M/uL  Hemoglobin : 11.0 g/dL  Hematocrit : 33.4 %  Platelet Count - Automated : 217 K/uL  Mean Cell Volume : 90.5 fl  Mean Cell Hemoglobin : 29.8 pg  Mean Cell Hemoglobin Concentration : 32.9 gm/dL  Auto Neutrophil # : x  Auto Lymphocyte # : x  Auto Monocyte # : x  Auto Eosinophil # : x  Auto Basophil # : x  Auto Neutrophil % : x  Auto Lymphocyte % : x  Auto Monocyte % : x  Auto Eosinophil % : x  Auto Basophil % : x    07-10    139  |  96  |  38<H>  ----------------------------<  110<H>  3.9   |  29  |  0.87    Ca    8.7      10 Jul 2022 02:48  Phos  2.7     07-10  Mg     2.2     07-10    TPro  6.6  /  Alb  3.5  /  TBili  0.8  /  DBili  x   /  AST  15  /  ALT  <5<L>  /  AlkPhos  60  07-10    PT/INR - ( 10 Jul 2022 02:48 )   PT: 14.1 sec;   INR: 1.21 ratio         PTT - ( 10 Jul 2022 02:48 )  PTT:27.9 sec    Blood Gas Profile - Arterial (07.10.22 @ 06:27)    pH, Arterial: 7.52: No collection time indicated, please interpret with caution    pCO2, Arterial: 42 mmHg    pO2, Arterial: 100 mmHg    HCO3, Arterial: 34 mmol/L    Base Excess, Arterial: 10.4 mmol/L    Oxygen Saturation, Arterial: 99.4 %    Total CO2, Arterial: 36 mmol/L        RADIOLOGY & ADDITIONAL TESTS: Reviewed   Subjective / Overnight Events: Pt febrile overnight and received additional dose of lasix overnight for low urine output. Gave additional lasix in the afternoon for goal urine output of 1 L.    Additional ROS (if any):    MEDICATIONS  (STANDING):  amLODIPine   Tablet 10 milliGRAM(s) Oral daily  apixaban 5 milliGRAM(s) Oral every 12 hours  bethanechol 25 milliGRAM(s) Oral three times a day  carbidopa/levodopa  25/100 1 Tablet(s) Oral three times a day  chlorhexidine 0.12% Liquid 15 milliLiter(s) Oral Mucosa every 12 hours  chlorhexidine 4% Liquid 1 Application(s) Topical <User Schedule>  collagenase Ointment 1 Application(s) Topical daily  dexMEDEtomidine Infusion 0.2 MICROgram(s)/kG/Hr (3.06 mL/Hr) IV Continuous <Continuous>  fentaNYL   Infusion... 1 MICROgram(s)/kG/Hr (3.06 mL/Hr) IV Continuous <Continuous>  furosemide    Tablet 40 milliGRAM(s) Oral daily  levothyroxine 100 MICROGram(s) Oral daily  losartan 50 milliGRAM(s) Oral daily  metoprolol succinate ER 50 milliGRAM(s) Oral daily  mometasone 220 MICROgram(s) Inhaler 1 Puff(s) Inhalation daily  piperacillin/tazobactam IVPB. 3.375 Gram(s) IV Intermittent once  piperacillin/tazobactam IVPB.. 3.375 Gram(s) IV Intermittent every 8 hours  vancomycin  IVPB 1000 milliGRAM(s) IV Intermittent every 12 hours    MEDICATIONS  (PRN):      CAPILLARY BLOOD GLUCOSE  POCT  Blood Glucose (07.09.22 @ 00:32)    POCT Blood Glucose.: 132 mg/dL    I&O's Summary    09 Jul 2022 07:01  -  10 Jul 2022 07:00  --------------------------------------------------------  IN: 1113 mL / OUT: 1150 mL / NET: -37 mL      PHYSICAL EXAM:  ICU Vital Signs Last 24 Hrs  T(C): 37.4 (10 Jul 2022 06:00), Max: 38.7 (09 Jul 2022 19:00)  T(F): 99.3 (10 Jul 2022 06:00), Max: 101.7 (09 Jul 2022 19:00)  HR: 52 (10 Jul 2022 07:00) (52 - 64)  BP: 142/63 (10 Jul 2022 07:00) (110/56 - 192/79)  BP(mean): 90 (10 Jul 2022 07:00) (79 - 129)  ABP: --  ABP(mean): --  RR: 14 (10 Jul 2022 07:00) (14 - 26)  SpO2: 100% (10 Jul 2022 07:00) (85% - 100%)    O2 Parameters below as of 10 Jul 2022 04:00  Patient On (Oxygen Delivery Method): ventilator    O2 Concentration (%): 50      Parameters below as of 09 Jul 2022 06:25  Patient On (Oxygen Delivery Method): ventilator    O2 Concentration (%): 50    General: Patient is intubated. No apparent distress.   HEENT: PERRLA, EOMi, no scleral icterus  CV: RRR, normal S1 and S2, no m/r/g  Lungs: normal respiratory effort. CTAB, no wheezes, rales, or rhonchi  Abd: soft, nontender, nondistended  Ext: no edema, 2+ peripheral pulses   Pysch: Intubated.  Neuro: grossly non-focal, moving all extremities spontaneously   Skin: no rashes or lesions     LABS:    CBC Full  -  ( 10 Jul 2022 02:48 )  WBC Count : 10.22 K/uL  RBC Count : 3.69 M/uL  Hemoglobin : 11.0 g/dL  Hematocrit : 33.4 %  Platelet Count - Automated : 217 K/uL  Mean Cell Volume : 90.5 fl  Mean Cell Hemoglobin : 29.8 pg  Mean Cell Hemoglobin Concentration : 32.9 gm/dL  Auto Neutrophil # : x  Auto Lymphocyte # : x  Auto Monocyte # : x  Auto Eosinophil # : x  Auto Basophil # : x  Auto Neutrophil % : x  Auto Lymphocyte % : x  Auto Monocyte % : x  Auto Eosinophil % : x  Auto Basophil % : x    07-10    139  |  96  |  38<H>  ----------------------------<  110<H>  3.9   |  29  |  0.87    Ca    8.7      10 Jul 2022 02:48  Phos  2.7     07-10  Mg     2.2     07-10    TPro  6.6  /  Alb  3.5  /  TBili  0.8  /  DBili  x   /  AST  15  /  ALT  <5<L>  /  AlkPhos  60  07-10    PT/INR - ( 10 Jul 2022 02:48 )   PT: 14.1 sec;   INR: 1.21 ratio         PTT - ( 10 Jul 2022 02:48 )  PTT:27.9 sec    Blood Gas Profile - Arterial (07.10.22 @ 06:27)    pH, Arterial: 7.52: No collection time indicated, please interpret with caution    pCO2, Arterial: 42 mmHg    pO2, Arterial: 100 mmHg    HCO3, Arterial: 34 mmol/L    Base Excess, Arterial: 10.4 mmol/L    Oxygen Saturation, Arterial: 99.4 %    Total CO2, Arterial: 36 mmol/L        RADIOLOGY & ADDITIONAL TESTS: Reviewed

## 2022-07-10 NOTE — PATIENT PROFILE ADULT - FALL HARM RISK - HARM RISK INTERVENTIONS

## 2022-07-11 LAB
-  AMIKACIN: SIGNIFICANT CHANGE UP
-  AMOXICILLIN/CLAVULANIC ACID: SIGNIFICANT CHANGE UP
-  AMPICILLIN/SULBACTAM: SIGNIFICANT CHANGE UP
-  AMPICILLIN: SIGNIFICANT CHANGE UP
-  AZTREONAM: SIGNIFICANT CHANGE UP
-  CEFAZOLIN: SIGNIFICANT CHANGE UP
-  CEFEPIME: SIGNIFICANT CHANGE UP
-  CEFOXITIN: SIGNIFICANT CHANGE UP
-  CEFTRIAXONE: SIGNIFICANT CHANGE UP
-  CIPROFLOXACIN: SIGNIFICANT CHANGE UP
-  ERTAPENEM: SIGNIFICANT CHANGE UP
-  GENTAMICIN: SIGNIFICANT CHANGE UP
-  IMIPENEM: SIGNIFICANT CHANGE UP
-  LEVOFLOXACIN: SIGNIFICANT CHANGE UP
-  MEROPENEM: SIGNIFICANT CHANGE UP
-  NITROFURANTOIN: SIGNIFICANT CHANGE UP
-  PIPERACILLIN/TAZOBACTAM: SIGNIFICANT CHANGE UP
-  TIGECYCLINE: SIGNIFICANT CHANGE UP
-  TOBRAMYCIN: SIGNIFICANT CHANGE UP
-  TRIMETHOPRIM/SULFAMETHOXAZOLE: SIGNIFICANT CHANGE UP
ALBUMIN SERPL ELPH-MCNC: 3.3 G/DL — SIGNIFICANT CHANGE UP (ref 3.3–5)
ALBUMIN SERPL ELPH-MCNC: 3.3 G/DL — SIGNIFICANT CHANGE UP (ref 3.3–5)
ALP SERPL-CCNC: 60 U/L — SIGNIFICANT CHANGE UP (ref 40–120)
ALP SERPL-CCNC: 61 U/L — SIGNIFICANT CHANGE UP (ref 40–120)
ALT FLD-CCNC: 5 U/L — LOW (ref 10–45)
ALT FLD-CCNC: 6 U/L — LOW (ref 10–45)
ANION GAP SERPL CALC-SCNC: 12 MMOL/L — SIGNIFICANT CHANGE UP (ref 5–17)
ANION GAP SERPL CALC-SCNC: 17 MMOL/L — SIGNIFICANT CHANGE UP (ref 5–17)
APTT BLD: 29.5 SEC — SIGNIFICANT CHANGE UP (ref 27.5–35.5)
AST SERPL-CCNC: 16 U/L — SIGNIFICANT CHANGE UP (ref 10–40)
AST SERPL-CCNC: 31 U/L — SIGNIFICANT CHANGE UP (ref 10–40)
BILIRUB SERPL-MCNC: 0.5 MG/DL — SIGNIFICANT CHANGE UP (ref 0.2–1.2)
BILIRUB SERPL-MCNC: 0.6 MG/DL — SIGNIFICANT CHANGE UP (ref 0.2–1.2)
BUN SERPL-MCNC: 35 MG/DL — HIGH (ref 7–23)
BUN SERPL-MCNC: 36 MG/DL — HIGH (ref 7–23)
CALCIUM SERPL-MCNC: 8.2 MG/DL — LOW (ref 8.4–10.5)
CALCIUM SERPL-MCNC: 8.2 MG/DL — LOW (ref 8.4–10.5)
CHLORIDE SERPL-SCNC: 96 MMOL/L — SIGNIFICANT CHANGE UP (ref 96–108)
CHLORIDE SERPL-SCNC: 97 MMOL/L — SIGNIFICANT CHANGE UP (ref 96–108)
CMV DNA CSF QL NAA+PROBE: SIGNIFICANT CHANGE UP
CMV DNA SPEC NAA+PROBE-LOG#: SIGNIFICANT CHANGE UP LOG10IU/ML
CO2 SERPL-SCNC: 27 MMOL/L — SIGNIFICANT CHANGE UP (ref 22–31)
CO2 SERPL-SCNC: 29 MMOL/L — SIGNIFICANT CHANGE UP (ref 22–31)
CREAT SERPL-MCNC: 0.85 MG/DL — SIGNIFICANT CHANGE UP (ref 0.5–1.3)
CREAT SERPL-MCNC: 0.91 MG/DL — SIGNIFICANT CHANGE UP (ref 0.5–1.3)
CULTURE RESULTS: SIGNIFICANT CHANGE UP
EBV EA AB SER IA-ACNC: 84.4 U/ML — HIGH
EBV EA AB TITR SER IF: POSITIVE
EBV EA IGG SER-ACNC: POSITIVE
EBV NA IGG SER IA-ACNC: 251 U/ML — HIGH
EBV PATRN SPEC IB-IMP: SIGNIFICANT CHANGE UP
EBV VCA IGG AVIDITY SER QL IA: POSITIVE
EBV VCA IGM SER IA-ACNC: <10 U/ML — SIGNIFICANT CHANGE UP
EBV VCA IGM SER IA-ACNC: >750 U/ML — HIGH
EBV VCA IGM TITR FLD: NEGATIVE — SIGNIFICANT CHANGE UP
EGFR: 60 ML/MIN/1.73M2 — SIGNIFICANT CHANGE UP
EGFR: 65 ML/MIN/1.73M2 — SIGNIFICANT CHANGE UP
GAS PNL BLDA: SIGNIFICANT CHANGE UP
GLUCOSE SERPL-MCNC: 103 MG/DL — HIGH (ref 70–99)
GLUCOSE SERPL-MCNC: 116 MG/DL — HIGH (ref 70–99)
HAV IGM SER-ACNC: SIGNIFICANT CHANGE UP
HBV CORE AB SER-ACNC: SIGNIFICANT CHANGE UP
HBV CORE IGM SER-ACNC: SIGNIFICANT CHANGE UP
HBV SURFACE AB SER-ACNC: SIGNIFICANT CHANGE UP
HBV SURFACE AG SER-ACNC: SIGNIFICANT CHANGE UP
HBV SURFACE AG SER-ACNC: SIGNIFICANT CHANGE UP
HCT VFR BLD CALC: 32.3 % — LOW (ref 34.5–45)
HCV AB S/CO SERPL IA: 0.23 S/CO — SIGNIFICANT CHANGE UP (ref 0–0.99)
HCV AB SERPL-IMP: SIGNIFICANT CHANGE UP
HGB BLD-MCNC: 10.6 G/DL — LOW (ref 11.5–15.5)
HIV 1+2 AB+HIV1 P24 AG SERPL QL IA: SIGNIFICANT CHANGE UP
INR BLD: 1.4 RATIO — HIGH (ref 0.88–1.16)
MAGNESIUM SERPL-MCNC: 2.2 MG/DL — SIGNIFICANT CHANGE UP (ref 1.6–2.6)
MAGNESIUM SERPL-MCNC: 2.3 MG/DL — SIGNIFICANT CHANGE UP (ref 1.6–2.6)
MCHC RBC-ENTMCNC: 29.5 PG — SIGNIFICANT CHANGE UP (ref 27–34)
MCHC RBC-ENTMCNC: 32.8 GM/DL — SIGNIFICANT CHANGE UP (ref 32–36)
MCV RBC AUTO: 90 FL — SIGNIFICANT CHANGE UP (ref 80–100)
METHOD TYPE: SIGNIFICANT CHANGE UP
MRSA PCR RESULT.: SIGNIFICANT CHANGE UP
NRBC # BLD: 0 /100 WBCS — SIGNIFICANT CHANGE UP (ref 0–0)
ORGANISM # SPEC MICROSCOPIC CNT: SIGNIFICANT CHANGE UP
ORGANISM # SPEC MICROSCOPIC CNT: SIGNIFICANT CHANGE UP
PHOSPHATE SERPL-MCNC: 4.8 MG/DL — HIGH (ref 2.5–4.5)
PHOSPHATE SERPL-MCNC: 6 MG/DL — HIGH (ref 2.5–4.5)
PLATELET # BLD AUTO: 243 K/UL — SIGNIFICANT CHANGE UP (ref 150–400)
POTASSIUM SERPL-MCNC: 2.9 MMOL/L — CRITICAL LOW (ref 3.5–5.3)
POTASSIUM SERPL-MCNC: 4.5 MMOL/L — SIGNIFICANT CHANGE UP (ref 3.5–5.3)
POTASSIUM SERPL-SCNC: 2.9 MMOL/L — CRITICAL LOW (ref 3.5–5.3)
POTASSIUM SERPL-SCNC: 4.5 MMOL/L — SIGNIFICANT CHANGE UP (ref 3.5–5.3)
PROT SERPL-MCNC: 6.3 G/DL — SIGNIFICANT CHANGE UP (ref 6–8.3)
PROT SERPL-MCNC: 6.5 G/DL — SIGNIFICANT CHANGE UP (ref 6–8.3)
PROTHROM AB SERPL-ACNC: 16.2 SEC — HIGH (ref 10.5–13.4)
RBC # BLD: 3.59 M/UL — LOW (ref 3.8–5.2)
RBC # FLD: 15 % — HIGH (ref 10.3–14.5)
S AUREUS DNA NOSE QL NAA+PROBE: DETECTED
SODIUM SERPL-SCNC: 138 MMOL/L — SIGNIFICANT CHANGE UP (ref 135–145)
SODIUM SERPL-SCNC: 140 MMOL/L — SIGNIFICANT CHANGE UP (ref 135–145)
SPECIMEN SOURCE: SIGNIFICANT CHANGE UP
T PALLIDUM AB TITR SER: NEGATIVE — SIGNIFICANT CHANGE UP
TSH SERPL-MCNC: 3.42 UIU/ML — SIGNIFICANT CHANGE UP (ref 0.27–4.2)
WBC # BLD: 10.67 K/UL — HIGH (ref 3.8–10.5)
WBC # FLD AUTO: 10.67 K/UL — HIGH (ref 3.8–10.5)

## 2022-07-11 PROCEDURE — 99222 1ST HOSP IP/OBS MODERATE 55: CPT | Mod: FS

## 2022-07-11 PROCEDURE — 99291 CRITICAL CARE FIRST HOUR: CPT

## 2022-07-11 RX ORDER — POTASSIUM CHLORIDE 20 MEQ
10 PACKET (EA) ORAL
Refills: 0 | Status: DISCONTINUED | OUTPATIENT
Start: 2022-07-11 | End: 2022-07-11

## 2022-07-11 RX ORDER — ASCORBIC ACID 60 MG
500 TABLET,CHEWABLE ORAL DAILY
Refills: 0 | Status: DISCONTINUED | OUTPATIENT
Start: 2022-07-11 | End: 2022-07-23

## 2022-07-11 RX ORDER — POTASSIUM CHLORIDE 20 MEQ
10 PACKET (EA) ORAL
Refills: 0 | Status: COMPLETED | OUTPATIENT
Start: 2022-07-11 | End: 2022-07-11

## 2022-07-11 RX ORDER — FUROSEMIDE 40 MG
40 TABLET ORAL ONCE
Refills: 0 | Status: DISCONTINUED | OUTPATIENT
Start: 2022-07-11 | End: 2022-07-11

## 2022-07-11 RX ORDER — POTASSIUM CHLORIDE 20 MEQ
40 PACKET (EA) ORAL ONCE
Refills: 0 | Status: DISCONTINUED | OUTPATIENT
Start: 2022-07-11 | End: 2022-07-11

## 2022-07-11 RX ORDER — POTASSIUM CHLORIDE 20 MEQ
40 PACKET (EA) ORAL ONCE
Refills: 0 | Status: COMPLETED | OUTPATIENT
Start: 2022-07-11 | End: 2022-07-11

## 2022-07-11 RX ORDER — CEFTRIAXONE 500 MG/1
1000 INJECTION, POWDER, FOR SOLUTION INTRAMUSCULAR; INTRAVENOUS EVERY 24 HOURS
Refills: 0 | Status: COMPLETED | OUTPATIENT
Start: 2022-07-11 | End: 2022-07-12

## 2022-07-11 RX ADMIN — Medication 25 MILLIGRAM(S): at 13:10

## 2022-07-11 RX ADMIN — Medication 1 APPLICATION(S): at 11:27

## 2022-07-11 RX ADMIN — APIXABAN 5 MILLIGRAM(S): 2.5 TABLET, FILM COATED ORAL at 17:40

## 2022-07-11 RX ADMIN — Medication 100 MILLIEQUIVALENT(S): at 05:33

## 2022-07-11 RX ADMIN — CARBIDOPA AND LEVODOPA 1 TABLET(S): 25; 100 TABLET ORAL at 05:37

## 2022-07-11 RX ADMIN — PIPERACILLIN AND TAZOBACTAM 25 GRAM(S): 4; .5 INJECTION, POWDER, LYOPHILIZED, FOR SOLUTION INTRAVENOUS at 13:10

## 2022-07-11 RX ADMIN — CARBIDOPA AND LEVODOPA 1 TABLET(S): 25; 100 TABLET ORAL at 23:09

## 2022-07-11 RX ADMIN — CEFTRIAXONE 100 MILLIGRAM(S): 500 INJECTION, POWDER, FOR SOLUTION INTRAMUSCULAR; INTRAVENOUS at 17:41

## 2022-07-11 RX ADMIN — APIXABAN 5 MILLIGRAM(S): 2.5 TABLET, FILM COATED ORAL at 05:37

## 2022-07-11 RX ADMIN — CHLORHEXIDINE GLUCONATE 15 MILLILITER(S): 213 SOLUTION TOPICAL at 17:40

## 2022-07-11 RX ADMIN — CHLORHEXIDINE GLUCONATE 15 MILLILITER(S): 213 SOLUTION TOPICAL at 05:35

## 2022-07-11 RX ADMIN — Medication 100 MILLIEQUIVALENT(S): at 04:22

## 2022-07-11 RX ADMIN — CARBIDOPA AND LEVODOPA 1 TABLET(S): 25; 100 TABLET ORAL at 13:10

## 2022-07-11 RX ADMIN — Medication 100 MICROGRAM(S): at 05:37

## 2022-07-11 RX ADMIN — AMLODIPINE BESYLATE 10 MILLIGRAM(S): 2.5 TABLET ORAL at 05:36

## 2022-07-11 RX ADMIN — PIPERACILLIN AND TAZOBACTAM 25 GRAM(S): 4; .5 INJECTION, POWDER, LYOPHILIZED, FOR SOLUTION INTRAVENOUS at 06:31

## 2022-07-11 RX ADMIN — Medication 25 MILLIGRAM(S): at 23:10

## 2022-07-11 RX ADMIN — Medication 25 MILLIGRAM(S): at 05:38

## 2022-07-11 RX ADMIN — Medication 100 MILLIEQUIVALENT(S): at 03:19

## 2022-07-11 RX ADMIN — Medication 40 MILLIGRAM(S): at 05:35

## 2022-07-11 RX ADMIN — CHLORHEXIDINE GLUCONATE 1 APPLICATION(S): 213 SOLUTION TOPICAL at 05:38

## 2022-07-11 RX ADMIN — Medication 40 MILLIEQUIVALENT(S): at 06:31

## 2022-07-11 RX ADMIN — LOSARTAN POTASSIUM 50 MILLIGRAM(S): 100 TABLET, FILM COATED ORAL at 05:36

## 2022-07-11 NOTE — DIETITIAN INITIAL EVALUATION ADULT - ADD RECOMMEND
As medically feasible, consider addition of multivitamin and Vitamin C to aid in wound healing. Continue to trend labs, weight, skin integrity, and intake.

## 2022-07-11 NOTE — DIETITIAN INITIAL EVALUATION ADULT - REASON FOR ADMISSION
Per Chart: Pt is a 89 year old female with PMH of Parkinson's disease, HTN, chronic DVT on Eliquis, and hypothyroidism  who was admitted for acute hypoxic respiratory failure most likely 2/2 PNA, currently intubated and treated with zosyn and vancomycin.

## 2022-07-11 NOTE — DIETITIAN INITIAL EVALUATION ADULT - OTHER CALCULATIONS
Fluid needs deferred to provider. The Samuel State Equation (PSU) 2003b was used to calculate resting energy expenditure: 1234 kcals

## 2022-07-11 NOTE — DIETITIAN INITIAL EVALUATION ADULT - PERTINENT LABORATORY DATA
07-11    138  |  97  |  35<H>  ----------------------------<  103<H>  4.5   |  29  |  0.85    Ca    8.2<L>      11 Jul 2022 07:35  Phos  6.0     07-11  Mg     2.3     07-11    TPro  6.5  /  Alb  3.3  /  TBili  0.5  /  DBili  x   /  AST  31  /  ALT  6<L>  /  AlkPhos  61  07-11  POCT Blood Glucose.: 116 mg/dL (07-10-22 @ 17:13)

## 2022-07-11 NOTE — CONSULT NOTE ADULT - SUBJECTIVE AND OBJECTIVE BOX
Wound SURGERY CONSULT NOTE    HPI:  89 year old female with PMH of Parkinson's disease, HTN, chronic DVT on Eliquis, and hypothyroidism who was brought in by EMS for shortness of breath. Limited history, obtained through paperwork from nursing home. Patient was suspected to have bilateral pneumonia based on chest X-ray at nursing home yesterday 7/8. On initial presentation patient was afebrile, had non labored breathing on 6L nasal canula, and was AOx1. In the ED, patient was found to be hypercapnic with a PCO2 of 104 and pH of 7.19 off a VBG. Patient was placed on avaps; however she continued to be hypercapnic and acidotic with repeat VBG showing PCO2 of 117 and pH of 7.09. At this time, patient was intubated.  (09 Jul 2022 05:11)        N/V/D,  BM/ Flatus,   NGT,     palp/ sob/dyspnea/ cp,       F/C/S  Wound consult requested by team to assist w/ management of      wound/ pressure injury.   Pt (unable to)  c/o pain, drainage, odor, color change,  worsening swelling. Offloading and pericare initiated Increasingly sedentary 2/2 to illness. Pt is Incontinent of urine & stool. (+)foster/ ostomy.   H/o falls, trauma.  Pt seen by Wound RN  [facility]/  Mar,[facility]/ Bhaskar/ Allevyn/ medihoney/ dakins/ Adaptic/ DSD recommened used at home/ while awaiting consult.  Appetite good/ decreased.  weight loss.  S&S / RD consult appreciated All questions asked and answered to pt's and family's expressed understanding and satisfaction.    Current Diet: Diet, NPO with Tube Feed:   Tube Feeding Modality: Orogastric  Jevity 1.2 Sg (JEVITY1.2RTH)  Total Volume for 24 Hours (mL): 900  Intermittent  Starting Tube Feed Rate mL per Hour: 20  Increase Tube Feed Rate by (mL): 10    Every 4 hours  Until Goal Tube Feed Rate (mL per Hour): 50  Tube Feeding Hours ON: 18  Tube Feeding OFF (Hours): 6  Tube Feed Start Time: 01:00 (07-11-22 @ 10:48)      PAST MEDICAL & SURGICAL HISTORY:  HTN (hypertension)      Parkinsons      HLD (hyperlipidemia)      DVT, lower extremity      Hypothyroid      No significant past surgical history          REVIEW OF SYSTEMS: Pt unable to offer  General/ Breast/ Skin/ Neuro/ MSK: see HPI  All other systems negative    MEDICATIONS  (STANDING):  amLODIPine   Tablet 10 milliGRAM(s) Oral daily  apixaban 5 milliGRAM(s) Oral every 12 hours  bethanechol 25 milliGRAM(s) Oral three times a day  carbidopa/levodopa  25/100 1 Tablet(s) Oral three times a day  chlorhexidine 0.12% Liquid 15 milliLiter(s) Oral Mucosa every 12 hours  chlorhexidine 4% Liquid 1 Application(s) Topical <User Schedule>  collagenase Ointment 1 Application(s) Topical daily  furosemide   Injectable 40 milliGRAM(s) IV Push daily  levothyroxine 100 MICROGram(s) Oral daily  losartan 50 milliGRAM(s) Oral daily  piperacillin/tazobactam IVPB.. 3.375 Gram(s) IV Intermittent every 8 hours  propofol Infusion 10 MICROgram(s)/kG/Min (3.61 mL/Hr) IV Continuous <Continuous>    MEDICATIONS  (PRN):  acetaminophen     Tablet .. 650 milliGRAM(s) Oral every 6 hours PRN Temp greater or equal to 38C (100.4F)      Allergies    No Known Allergies    Intolerances        SOCIAL HISTORY:  / /single/ ; (+)HHA/ lives in SNF; Former smoker, Nourrent/ Denies smoking, ETOH, drugs    FAMILY HISTORY:   no h/o PVD or wound healing or skin/ significant problems    PHYSICAL EXAM:  Vital Signs Last 24 Hrs  T(C): 37.5 (11 Jul 2022 12:00), Max: 38.1 (10 Jul 2022 21:23)  T(F): 99.5 (11 Jul 2022 12:00), Max: 100.6 (10 Jul 2022 21:23)  HR: 66 (11 Jul 2022 12:00) (49 - 72)  BP: 100/53 (11 Jul 2022 12:00) (98/52 - 161/73)  BP(mean): 73 (11 Jul 2022 12:00) (69 - 111)  RR: 18 (11 Jul 2022 12:00) (14 - 31)  SpO2: 95% (11 Jul 2022 12:00) (90% - 100%)    Parameters below as of 11 Jul 2022 08:00  Patient On (Oxygen Delivery Method): ventilator    O2 Concentration (%): 21    NAD / Guarded but stable,  A&Ox3/ Alert/ Confused  cachectic/ thin/  MO/ Obese/ frail  WD/ WN/ WG/ Disheveled  Total Care Sport/ Versa Care P500 / Envella Progressa bed     HEENT:  NC/AT, PERRL, EOMI, sclera clear, mucosa moist, throat clear, trachea midline, neck supple, trach  Respiratory: nonlabored w/ equal chest rise  Gastrointestinal soft NT/ND (+)BS  (+)PEG (+)ostomy (+)NGT  : (+)foster/ purewick  Neurology:  weakened strength & sensation grossly intact/ paraesthesia  nonverbal, no follow commands/ paraplegic  Psych: calm/ appropriate/ flat affect/ easily aggitated/ restless  Musculoskeletal:  limited stiff / FROM, no deformities/ contractures  Vascular: BLE equally warm/ cool,  no cyanosis, clubbing, edema           >LE //BLE edema equal           BLE DP/PT pulses palpable           no acute ischemia noted          BLE hemosiderin staining  Skin:  moist w/ good turgor  pale, frail,  ecchymosis w/o hematoma  serosanguinous drainage  No odor, erythema, increased warmth, tenderness, induration, fluctuance, nor crepitus    LABS/ CULTURES/ RADIOLOGY:                        10.6   10.67 )-----------( 243      ( 11 Jul 2022 02:23 )             32.3       138  |  97  |  35  ----------------------------<  103      [07-11-22 @ 07:35]  4.5   |  29  |  0.85        Ca     8.2     [07-11-22 @ 07:35]      Mg     2.3     [07-11-22 @ 07:35]      Phos  6.0     [07-11-22 @ 07:35]    TPro  6.5  /  Alb  3.3  /  TBili  0.5  /  DBili  x   /  AST  31  /  ALT  6   /  AlkPhos  61  [07-11-22 @ 07:35]    PT/INR: PT 16.2 , INR 1.40       [07-11-22 @ 02:23]  PTT: 29.5       [07-11-22 @ 02:23]      Hemoglobin A1C     Culture - Blood (collected 07-09-22 @ 02:09)  Source: .Blood Blood-Peripheral  Preliminary Report (07-10-22 @ 06:01):    No growth to date.    Culture - Blood (collected 07-09-22 @ 00:30)  Source: .Blood Blood-Peripheral  Preliminary Report (07-10-22 @ 06:01):    No growth to date.                     Wound SURGERY CONSULT NOTE    HPI:  89 year old female with PMH of Parkinson's disease, HTN, chronic DVT on Eliquis, and hypothyroidism who was brought in by EMS for shortness of breath. Limited history, obtained through paperwork from nursing home. Patient was suspected to have bilateral pneumonia based on chest X-ray at nursing home yesterday 7/8. On initial presentation patient was afebrile, had non labored breathing on 6L nasal canula, and was AOx1. In the ED, patient was found to be hypercapnic with a PCO2 of 104 and pH of 7.19 off a VBG. Patient was placed on avaps; however she continued to be hypercapnic and acidotic with repeat VBG showing PCO2 of 117 and pH of 7.09. At this time, patient was intubated.   Wound consult requested by team to assist w/ management of sacral pressure injury. Pt unable to c/o pain, drainage, odor, color change, or swelling. Offloading and pericare initiated as pt sedentary 2/2 to illness. Pt is Incontinent of urine & stool. (+)foster.   Collagenase used at [facility] w/  Allevyn.  Pt started on TF.  RD consult appreciated.    Current Diet: Diet, NPO with Tube Feed:   Tube Feeding Modality: Orogastric  Jevity 1.2 Sg (JEVITY1.2RTH)  Total Volume for 24 Hours (mL): 900  Intermittent  Starting Tube Feed Rate mL per Hour: 20  Increase Tube Feed Rate by (mL): 10    Every 4 hours  Until Goal Tube Feed Rate (mL per Hour): 50  Tube Feeding Hours ON: 18  Tube Feeding OFF (Hours): 6  Tube Feed Start Time: 01:00 (07-11-22 @ 10:48)      PAST MEDICAL & SURGICAL HISTORY:  HTN (hypertension)    Parkinson's    HLD (hyperlipidemia)    DVT, lower extremity    Hypothyroid    No significant past surgical history      REVIEW OF SYSTEMS: Pt unable to offer    MEDICATIONS  (STANDING):  amLODIPine   Tablet 10 milliGRAM(s) Oral daily  apixaban 5 milliGRAM(s) Oral every 12 hours  bethanechol 25 milliGRAM(s) Oral three times a day  carbidopa/levodopa  25/100 1 Tablet(s) Oral three times a day  chlorhexidine 0.12% Liquid 15 milliLiter(s) Oral Mucosa every 12 hours  chlorhexidine 4% Liquid 1 Application(s) Topical <User Schedule>  collagenase Ointment 1 Application(s) Topical daily  furosemide   Injectable 40 milliGRAM(s) IV Push daily  levothyroxine 100 MICROGram(s) Oral daily  losartan 50 milliGRAM(s) Oral daily  piperacillin/tazobactam IVPB.. 3.375 Gram(s) IV Intermittent every 8 hours  propofol Infusion 10 MICROgram(s)/kG/Min (3.61 mL/Hr) IV Continuous <Continuous>    MEDICATIONS  (PRN):  acetaminophen     Tablet .. 650 milliGRAM(s) Oral every 6 hours PRN Temp greater or equal to 38C (100.4F)      No Known Allergies    SOCIAL HISTORY[place]; lives in [facility];  No smoking, ETOH, drugs    FAMILY HISTORY:  no h/o PVD or wound healing or skin problems    PHYSICAL EXAM:  Vital Signs Last 24 Hrs  T(C): 37.5 (11 Jul 2022 12:00), Max: 38.1 (10 Jul 2022 21:23)  T(F): 99.5 (11 Jul 2022 12:00), Max: 100.6 (10 Jul 2022 21:23)  HR: 66 (11 Jul 2022 12:00) (49 - 72)  BP: 100/53 (11 Jul 2022 12:00) (98/52 - 161/73)  BP(mean): 73 (11 Jul 2022 12:00) (69 - 111)  RR: 18 (11 Jul 2022 12:00) (14 - 31)  SpO2: 95% (11 Jul 2022 12:00) (90% - 100%)    Guarded but stable, sedated,  Obese, frail  Total Care Sport bed   HEENT:  NC/AT, EOMI, sclera clear, mucosa moist, throat clear, trachea midline, neck supple, trach  Respiratory: nonlabored w/ equal chest rise  Gastrointestinal soft NT/ND  (+)OGT  : (+)foster  Neurology: nonverbal, no follow commands  Psych: unable to assess  Musculoskeletal: limited stiff passive ROM, (+) contractures  Vascular: BLE equally warm,  no cyanosis, clubbing, edema  Skin:  pale, frail,  ecchymosis w/o hematoma      Stage 4 sacral pressure ulcer  1.5cm x 2.5 x 4cm       healing with contracted skin edges w/ epiboly      what is visible of wound bed is granular      (+)serosanguinous drainage  No odor, erythema, increased warmth, tenderness, induration, fluctuance, nor crepitus    LABS/ CULTURES/ RADIOLOGY:                        10.6   10.67 )-----------( 243      ( 11 Jul 2022 02:23 )             32.3       138  |  97  |  35  ----------------------------<  103      [07-11-22 @ 07:35]  4.5   |  29  |  0.85        Ca     8.2     [07-11-22 @ 07:35]      Mg     2.3     [07-11-22 @ 07:35]      Phos  6.0     [07-11-22 @ 07:35]    TPro  6.5  /  Alb  3.3  /  TBili  0.5  /  DBili  x   /  AST  31  /  ALT  6   /  AlkPhos  61  [07-11-22 @ 07:35]    PT/INR: PT 16.2 , INR 1.40       [07-11-22 @ 02:23]  PTT: 29.5       [07-11-22 @ 02:23]        Culture - Blood (collected 07-09-22 @ 02:09)  Source: .Blood Blood-Peripheral  Preliminary Report (07-10-22 @ 06:01):    No growth to date.    Culture - Blood (collected 07-09-22 @ 00:30)  Source: .Blood Blood-Peripheral  Preliminary Report (07-10-22 @ 06:01):    No growth to date.

## 2022-07-11 NOTE — DIETITIAN INITIAL EVALUATION ADULT - NS FNS DIET ORDER
Diet, NPO with Tube Feed:   Tube Feeding Modality: Orogastric  Jevity 1.2 Sg (JEVITY1.2RTH)  Total Volume for 24 Hours (mL): 900  Intermittent  Starting Tube Feed Rate {mL per Hour}: 20  Increase Tube Feed Rate by (mL): 10    Every 4 hours  Until Goal Tube Feed Rate (mL per Hour): 50  Tube Feeding Hours ON: 18  Tube Feeding OFF (Hours): 6  Tube Feed Start Time: 01:00 (07-11-22 @ 10:48)

## 2022-07-11 NOTE — DIETITIAN INITIAL EVALUATION ADULT - ENTERAL
Continue Jevity 1.2 at 50 mL/hr x18 hrs to provide total volume 900 mL, 1080 kcals (total 1445 kcals with propofol providing 285 kcals), 72 gm protein, and 726 mL free water. Meets 24 kcals/kG and 1.2 gm protein/kG based on dosing wt 60.1 kG. Continue to trend Phos for changes to EN as needed.  Jevity 1.2 at 50 mL/hr x18 hrs with ProSource NoCarb TF x2 daily to provide total volume 900 mL, 1080 kcals (total 1445 kcals with propofol providing 285 kcals), 72 gm protein, and 726 mL free water. Meets 24 kcals/kG and 1.2 gm protein/kG based on dosing wt 60.1 kG. Continue to trend Phos for changes to EN as needed.

## 2022-07-11 NOTE — PROGRESS NOTE ADULT - SUBJECTIVE AND OBJECTIVE BOX
Subjective / Overnight Events: No acute events overnight. Pt almost reached 1 L goal of urine yesterday. Received usual dose of lasix this morning. TSH normal.    Additional ROS (if any):    MEDICATIONS  (STANDING):  amLODIPine   Tablet 10 milliGRAM(s) Oral daily  apixaban 5 milliGRAM(s) Oral every 12 hours  bethanechol 25 milliGRAM(s) Oral three times a day  carbidopa/levodopa  25/100 1 Tablet(s) Oral three times a day  chlorhexidine 0.12% Liquid 15 milliLiter(s) Oral Mucosa every 12 hours  chlorhexidine 4% Liquid 1 Application(s) Topical <User Schedule>  collagenase Ointment 1 Application(s) Topical daily  dexMEDEtomidine Infusion 0.2 MICROgram(s)/kG/Hr (3.06 mL/Hr) IV Continuous <Continuous>  fentaNYL   Infusion... 1 MICROgram(s)/kG/Hr (3.06 mL/Hr) IV Continuous <Continuous>  furosemide    Tablet 40 milliGRAM(s) Oral daily  levothyroxine 100 MICROGram(s) Oral daily  losartan 50 milliGRAM(s) Oral daily  metoprolol succinate ER 50 milliGRAM(s) Oral daily  mometasone 220 MICROgram(s) Inhaler 1 Puff(s) Inhalation daily  piperacillin/tazobactam IVPB. 3.375 Gram(s) IV Intermittent once  piperacillin/tazobactam IVPB.. 3.375 Gram(s) IV Intermittent every 8 hours  vancomycin  IVPB 1000 milliGRAM(s) IV Intermittent every 12 hours    MEDICATIONS  (PRN):    POCT  Blood Glucose (07.10.22 @ 17:13)    POCT Blood Glucose.: 116 mg/dL    I&O's Summary    10 Jul 2022 07:01  -  11 Jul 2022 07:00  --------------------------------------------------------  IN: 1494.4 mL / OUT: 1835 mL / NET: -340.6 mL      PHYSICAL EXAM:    Vital Signs Last 24 Hrs  T(C): 37.8 (11 Jul 2022 03:00), Max: 38.1 (10 Jul 2022 21:23)  T(F): 100 (11 Jul 2022 03:00), Max: 100.6 (10 Jul 2022 21:23)  HR: 71 (11 Jul 2022 06:00) (48 - 72)  BP: 141/93 (11 Jul 2022 06:00) (98/52 - 161/73)  BP(mean): 111 (11 Jul 2022 06:00) (69 - 111)  RR: 20 (11 Jul 2022 06:00) (14 - 31)  SpO2: 100% (11 Jul 2022 06:00) (96% - 100%)    Parameters below as of 10 Jul 2022 08:00  Patient On (Oxygen Delivery Method): ventilator    O2 Concentration (%): 50    General: Patient is intubated. No apparent distress.   HEENT: PERRLA, EOMi, no scleral icterus  CV: RRR, normal S1 and S2, no m/r/g  Lungs: normal respiratory effort. CTAB, no wheezes, rales, or rhonchi  Abd: soft, nontender, nondistended  Ext: no edema, 2+ peripheral pulses   Pysch: Intubated.  Neuro: grossly non-focal, moving all extremities spontaneously   Skin: no rashes or lesions     LABS:    CBC Full  -  ( 11 Jul 2022 02:23 )  WBC Count : 10.67 K/uL  RBC Count : 3.59 M/uL  Hemoglobin : 10.6 g/dL  Hematocrit : 32.3 %  Platelet Count - Automated : 243 K/uL  Mean Cell Volume : 90.0 fl  Mean Cell Hemoglobin : 29.5 pg  Mean Cell Hemoglobin Concentration : 32.8 gm/dL  Auto Neutrophil # : x  Auto Lymphocyte # : x  Auto Monocyte # : x  Auto Eosinophil # : x  Auto Basophil # : x  Auto Neutrophil % : x  Auto Lymphocyte % : x  Auto Monocyte % : x  Auto Eosinophil % : x  Auto Basophil % : x    07-11    140  |  96  |  36<H>  ----------------------------<  116<H>  2.9<LL>   |  27  |  0.91    Ca    8.2<L>      11 Jul 2022 02:23  Phos  4.8     07-11  Mg     2.2     07-11    TPro  6.3  /  Alb  3.3  /  TBili  0.6  /  DBili  x   /  AST  16  /  ALT  5<L>  /  AlkPhos  60  07-11    Blood Gas Profile - Arterial (07.11.22 @ 06:15)    pH, Arterial: 7.45    pCO2, Arterial: 50 mmHg    pO2, Arterial: 177 mmHg    HCO3, Arterial: 35 mmol/L    Base Excess, Arterial: 9.4 mmol/L    Oxygen Saturation, Arterial: 99.4 %    Total CO2, Arterial: 36 mmol/L    FIO2, Arterial: 40    Blood Gas Source Arterial: Arterial        RADIOLOGY & ADDITIONAL TESTS: Reviewed   Subjective / Overnight Events: No acute events overnight. Pt almost reached 1 L goal of urine yesterday. Received usual dose of lasix this morning. Pt trying to speak this morning and much improved mental status, able to shake head no and yes.    Additional ROS (if any):    MEDICATIONS  (STANDING):  amLODIPine   Tablet 10 milliGRAM(s) Oral daily  apixaban 5 milliGRAM(s) Oral every 12 hours  bethanechol 25 milliGRAM(s) Oral three times a day  carbidopa/levodopa  25/100 1 Tablet(s) Oral three times a day  chlorhexidine 0.12% Liquid 15 milliLiter(s) Oral Mucosa every 12 hours  chlorhexidine 4% Liquid 1 Application(s) Topical <User Schedule>  collagenase Ointment 1 Application(s) Topical daily  dexMEDEtomidine Infusion 0.2 MICROgram(s)/kG/Hr (3.06 mL/Hr) IV Continuous <Continuous>  fentaNYL   Infusion... 1 MICROgram(s)/kG/Hr (3.06 mL/Hr) IV Continuous <Continuous>  furosemide    Tablet 40 milliGRAM(s) Oral daily  levothyroxine 100 MICROGram(s) Oral daily  losartan 50 milliGRAM(s) Oral daily  metoprolol succinate ER 50 milliGRAM(s) Oral daily  mometasone 220 MICROgram(s) Inhaler 1 Puff(s) Inhalation daily  piperacillin/tazobactam IVPB. 3.375 Gram(s) IV Intermittent once  piperacillin/tazobactam IVPB.. 3.375 Gram(s) IV Intermittent every 8 hours  vancomycin  IVPB 1000 milliGRAM(s) IV Intermittent every 12 hours    MEDICATIONS  (PRN):    POCT  Blood Glucose (07.10.22 @ 17:13)    POCT Blood Glucose.: 116 mg/dL    I&O's Summary    10 Jul 2022 07:01  -  11 Jul 2022 07:00  --------------------------------------------------------  IN: 1494.4 mL / OUT: 1835 mL / NET: -340.6 mL      PHYSICAL EXAM:    Vital Signs Last 24 Hrs  T(C): 37.8 (11 Jul 2022 03:00), Max: 38.1 (10 Jul 2022 21:23)  T(F): 100 (11 Jul 2022 03:00), Max: 100.6 (10 Jul 2022 21:23)  HR: 71 (11 Jul 2022 06:00) (48 - 72)  BP: 141/93 (11 Jul 2022 06:00) (98/52 - 161/73)  BP(mean): 111 (11 Jul 2022 06:00) (69 - 111)  RR: 20 (11 Jul 2022 06:00) (14 - 31)  SpO2: 100% (11 Jul 2022 06:00) (96% - 100%)    Parameters below as of 10 Jul 2022 08:00  Patient On (Oxygen Delivery Method): ventilator    O2 Concentration (%): 50    General: Patient is intubated. No apparent distress.   HEENT: PERRLA, EOMi, no scleral icterus  CV: RRR, normal S1 and S2, no m/r/g  Lungs: normal respiratory effort. CTAB, no wheezes, rales, or rhonchi  Abd: soft, nontender, nondistended  Ext: no edema, 2+ peripheral pulses   Pysch: Intubated.  Neuro: grossly non-focal, moving all extremities spontaneously   Skin: no rashes or lesions     LABS:    CBC Full  -  ( 11 Jul 2022 02:23 )  WBC Count : 10.67 K/uL  RBC Count : 3.59 M/uL  Hemoglobin : 10.6 g/dL  Hematocrit : 32.3 %  Platelet Count - Automated : 243 K/uL  Mean Cell Volume : 90.0 fl  Mean Cell Hemoglobin : 29.5 pg  Mean Cell Hemoglobin Concentration : 32.8 gm/dL  Auto Neutrophil # : x  Auto Lymphocyte # : x  Auto Monocyte # : x  Auto Eosinophil # : x  Auto Basophil # : x  Auto Neutrophil % : x  Auto Lymphocyte % : x  Auto Monocyte % : x  Auto Eosinophil % : x  Auto Basophil % : x    07-11    140  |  96  |  36<H>  ----------------------------<  116<H>  2.9<LL>   |  27  |  0.91    Ca    8.2<L>      11 Jul 2022 02:23  Phos  4.8     07-11  Mg     2.2     07-11    TPro  6.3  /  Alb  3.3  /  TBili  0.6  /  DBili  x   /  AST  16  /  ALT  5<L>  /  AlkPhos  60  07-11    Blood Gas Profile - Arterial (07.11.22 @ 06:15)    pH, Arterial: 7.45    pCO2, Arterial: 50 mmHg    pO2, Arterial: 177 mmHg    HCO3, Arterial: 35 mmol/L    Base Excess, Arterial: 9.4 mmol/L    Oxygen Saturation, Arterial: 99.4 %    Total CO2, Arterial: 36 mmol/L    FIO2, Arterial: 40    Blood Gas Source Arterial: Arterial        RADIOLOGY & ADDITIONAL TESTS: Reviewed

## 2022-07-11 NOTE — DIETITIAN INITIAL EVALUATION ADULT - OTHER INFO
Wt Hx: Dosing wt 60.1 kG (132.4 lbs). UBW unknown. No other wts to address. RD will continue to trend as new wts available/able.     Nutrition-Related Concerns:   - Hx of Parkinson's disease, pt on Sinement tid  - Oral levothyroxine   - Intubated 7/9. On propofol at 10.8 mL/hr, if to continue at current rate x24 hrs to provide 285 kcals   - Has decubitus ulcer, stage 4 on back  - Elevated Phos noted

## 2022-07-11 NOTE — DIETITIAN INITIAL EVALUATION ADULT - PERTINENT MEDS FT
MEDICATIONS  (STANDING):  amLODIPine   Tablet 10 milliGRAM(s) Oral daily  apixaban 5 milliGRAM(s) Oral every 12 hours  bethanechol 25 milliGRAM(s) Oral three times a day  carbidopa/levodopa  25/100 1 Tablet(s) Oral three times a day  furosemide   Injectable 40 milliGRAM(s) IV Push daily  levothyroxine 100 MICROGram(s) Oral daily  losartan 50 milliGRAM(s) Oral daily  piperacillin/tazobactam IVPB.. 3.375 Gram(s) IV Intermittent every 8 hours  propofol Infusion 10 MICROgram(s)/kG/Min (3.61 mL/Hr) IV Continuous <Continuous>

## 2022-07-11 NOTE — PROGRESS NOTE ADULT - ASSESSMENT
89 year old female with PMH of Parkinson's disease, HTN, chronic DVT on Eliquis, and hypothyroidism  who was admitted for acute hypoxic respiratory failure most likely 2/2 PNA, currently intubated and treated with zosyn and vancomycin.     Neuro:  - AOx1 on presentation, unknown baseline   - roby on precedex- d/maryam. Sedated with propofol   - unequal pupils on exam since admission  - CT head with no acute intracranial hemorrhage, hydrocephalus or extra-axial fluid collection. mild parenchymal volume loss and mild chronic microvascular ischemic changes    #Parkinson  -continue carbidopa/levodopa via OG tube     Cardiovascular:  #Heart-failure? / fluid overloaded  - BNP 3075 on 7/9  - echo with EF of 55% and mild aortic regurgitation,  paradoxical septal wall motion abnormality due to conduction abnormality  - 40mg IV lasix daily  - monitor I/Os    #Chronic DVT  - CT head negative  - apixaban 5mg q12hrs    #Hypertension  - c/w home amlodipine     Pulmonary:  #Acute Hypoxic Respiratory Failure   - Intubated, Vent (A/C) Volume: 400 Rate: 24 FiO2: 50 PEEP: 5  - repeat ABG with pH of 7.45, pCO2 50, pO2 177, HCO3 35    #Pneumonia  - c/w Zosyn. Holding Vancomycin (has decubitus ulcer, stage 4 on back) for elevated trough level  -repeat vanc trough at 5 pm  - Consider bronchoscopy for mucus plug   - sputum cultures NGTD    #COPD?  - Uses fluticasone and Duoneb at nursing home, will continue    GI/Nutrition:  - No active issues   - tube feeds via OG tube     Renal:  - Creatinine 0.87  - Monitor urine output, foster  - 40 IV lasix   - f/u Cr, CMP daily    ID:  -Septic W/U for Aspiration PNA, UA +ve UTI, and Stage IV Decubital Wound, Tmax 101.9  -c/w zosyn  - hold Vancomycin due to decubitus ulcer -> elevated trough, repeat at 5 pm  - Nasal Swab for MRSA ordered, will f/u  - F/u blood, urine, and sputum cultures   - if fever curve remains elevated would check RP/Kidney US    Heme:   #anemia  - Hemoglobin 10.6 today -admitted with 11.4, unclear baseline. No active bleeding   - CBC daily     Endocrine:  #Hypothyroidism  - will continue home med, Synthroid     Skin  #decubitus ulcer, stage 4   - wound care consult, will f/u    Ethics:  - FULL CODE pending clarification with guardian. Unable to reach on Friday overnight and Saturday. Spoke with family friend who said will reach out to the guardian.     DVT: Eliquis                  89 year old female with PMH of Parkinson's disease, HTN, chronic DVT on Eliquis, and hypothyroidism who was admitted for acute hypoxic respiratory failure most likely 2/2 PNA, currently intubated and treated with zosyn and vancomycin (holding for elevated trough level).     Neuro:  - AOx1 on presentation, unknown baseline   - roby on precedex- d/maryam. Sedated with propofol   - unequal pupils on exam since admission  - CT head with no acute intracranial hemorrhage, hydrocephalus or extra-axial fluid collection. mild parenchymal volume loss and mild chronic microvascular ischemic changes    #Parkinson  -continue carbidopa/levodopa via OG tube     Cardiovascular:  #Heart-failure? / fluid overloaded  - BNP 3075 on 7/9  - echo with EF of 55% and mild aortic regurgitation,  paradoxical septal wall motion abnormality due to conduction abnormality  - 40mg IV lasix daily  - monitor I/Os    #Chronic DVT  - CT head negative  - apixaban 5mg q12hrs    #Hypertension  - c/w home amlodipine     Pulmonary:  #Acute Hypoxic Respiratory Failure   - Intubated, Vent (A/C) Volume: 400 Rate: 24 FiO2: 50 PEEP: 5  - repeat ABG with pH of 7.45, pCO2 50, pO2 177, HCO3 35  - lowered rate and weaning off sedation, potential extubation if pt tolerates it    #Pneumonia  - c/w Zosyn. Holding Vancomycin (has decubitus ulcer, stage 4 on back) for elevated trough level  -repeat vanc trough at 5 pm  - Consider bronchoscopy for mucus plug   - sputum cultures NGTD    #COPD?  - Uses fluticasone and Duoneb at nursing home, will continue    GI/Nutrition:  - No active issues   - tube feeds via OG tube     Renal:  - Creatinine 0.87  - Monitor urine output, foster  - 40 IV lasix   - f/u Cr, CMP daily    ID:  -Septic W/U for Aspiration PNA, UA +ve UTI, and Stage IV Decubital Wound, Tmax 101.9  -c/w zosyn  - hold Vancomycin due to decubitus ulcer -> elevated trough, repeat at 5 pm  - Nasal Swab for MRSA ordered, will f/u  - F/u blood, urine, and sputum cultures   - if fever curve remains elevated would check RP/Kidney US    Heme:   #anemia  - Hemoglobin 10.6 today -admitted with 11.4, unclear baseline. No active bleeding   - CBC daily     Endocrine:  #Hypothyroidism  - will continue home med, Synthroid     Skin  #decubitus ulcer, stage 4   - wound care consult, will f/u    Ethics:  - FULL CODE pending clarification with guardian. Spoke to guardian on 7/11 after not being able to reach her- she stated that she does not have authority to make decisions on code status unless she goes to court with a doctor. She will try to reach out to nursing home to see if they ever filled out a MOLST form for the patient.     DVT: Eliquis                  89 year old female with PMH of Parkinson's disease, HTN, chronic DVT on Eliquis, and hypothyroidism who was admitted for acute hypoxic respiratory failure most likely 2/2 PNA, currently intubated and treated with zosyn and vancomycin (holding for elevated trough level).     Neuro:  - AOx1 on presentation, unknown baseline   - roby on precedex- d/maryam. Weaning off sedation today, will monitor ABG to see if ready for extubation   - unequal pupils on exam since admission  - CT head with no acute intracranial hemorrhage, hydrocephalus or extra-axial fluid collection. mild parenchymal volume loss and mild chronic microvascular ischemic changes    #Parkinson  -continue carbidopa/levodopa via OG tube     Cardiovascular:  #Heart-failure? / fluid overloaded  - BNP 3075 on 7/9  - echo with EF of 55% and mild aortic regurgitation,  paradoxical septal wall motion abnormality due to conduction abnormality  - 40mg IV lasix daily  - monitor I/Os    #Chronic DVT  - CT head negative  - apixaban 5mg q12hrs    #Hypertension  - c/w home amlodipine     Pulmonary:  #Acute Hypoxic Respiratory Failure   - Intubated, Vent (A/C) Volume: 400 Rate: 24 FiO2: 50 PEEP: 5  - alkalotic ABG, continue to monitor  - lowered rate and weaning off sedation, potential extubation if pt tolerates it    #Pneumonia  - c/w Zosyn. Holding Vancomycin (has decubitus ulcer, stage 4 on back) for elevated trough level  -repeat vanc trough at 5 pm  - Consider bronchoscopy for mucus plug   - sputum cultures NGTD    #COPD?  -per guardian, pt does not have a hx of COPD and if she did smoke, it was many years ago   - Uses fluticasone and Duoneb at nursing home, will continue    GI/Nutrition:  - No active issues   - tube feeds via OG tube     Renal:  - Creatinine 0.87  - Monitor urine output, foster  - 40 IV lasix   - f/u Cr, CMP daily    ID:  -Septic W/U for Aspiration PNA, UA +ve UTI, and Stage IV Decubital Wound, Tmax 101.9  -c/w zosyn  - hold Vancomycin due to decubitus ulcer -> elevated trough, repeat at 5 pm  - Nasal Swab for MRSA ordered  - blood, urine, and sputum cultures NGTD  - if fever curve remains elevated would check RP/Kidney US    Heme:   #anemia  - Hemoglobin 10.6 today -admitted with 11.4, unclear baseline. No active bleeding   - CBC daily     Endocrine:  #Hypothyroidism  - will continue home med, Synthroid     Skin  #decubitus ulcer, stage 4   - wound care consult  -Sacral wound- Aquacel dressing QD  -BLE elevation  -Abx per MICU  -Moisturize intact skin w/ SWEEN cream BID  -Nutrition Consult for optimization in pt w/ Increased nutritional needs  -Continue turning and positioning w/ offloading assistive devices as per protocol      Ethics:  - FULL CODE pending clarification with guardian. Spoke to guardian on 7/11 after not being able to reach her- she stated that she does not have authority to make decisions on code status unless she goes to court with a doctor. She will try to reach out to nursing home to see if they ever filled out a MOLST form for the patient.     DVT: Eliquis

## 2022-07-11 NOTE — DIETITIAN INITIAL EVALUATION ADULT - NSFNSGIIOFT_GEN_A_CORE
07-10-22 @ 07:01  -  07-11-22 @ 07:00  --------------------------------------------------------  OUT:  Total OUT: 0 mL    Total NET: 480 mL      07-11-22 @ 07:01  -  07-11-22 @ 11:42  --------------------------------------------------------  OUT:  Total OUT: 0 mL    Total NET: 80 mL

## 2022-07-11 NOTE — CONSULT NOTE ADULT - ASSESSMENT
A/P: 89 year old female with PMH of Parkinson's disease, HTN, chronic DVT on Eliquis, and hypothyroidism  who was admitted for acute hypoxic respiratory failure most likely due to pneumonia.    Wound Consult requested to assist w/ management of Sacral stage 4 pressure injury  Incontinence of urine and stool      Sacral wound- Aquacel dressing QD  BLE elevation  Abx per MICU  Moisturize intact skin w/ SWEEN cream BID  Nutrition Consult for optimization in pt w/ Increased nutritional needs        high quality protein w/ TF, MVI & Vit deficiencies to promote wound healing  Continue turning and positioning w/ offloading assistive devices as per protocol  Buttocks/ Sacrum: Mar BID and prn soiling //       Continue w/ attends under pads and Pericare w/ foster maintenance as per protocol  Waffle Cushion to chair when oob to chair  Continue w/ low air loss pressure redistribution bed surface   Care as per micu, will follow w/ you  Upon discharge [facility] [address] [place], [place] [phone]  Seen w/ RN and D/w team & attng  Thank you for this consult  Ashley Quiroga PA-C CWS [id number]  I spent 50minutes face to face w/ this pt of which more than 50% of the time was spent counseling & coordinating care of this pt.

## 2022-07-12 LAB
ALBUMIN SERPL ELPH-MCNC: 3.1 G/DL — LOW (ref 3.3–5)
ALP SERPL-CCNC: 59 U/L — SIGNIFICANT CHANGE UP (ref 40–120)
ALT FLD-CCNC: <5 U/L — LOW (ref 10–45)
ANION GAP SERPL CALC-SCNC: 12 MMOL/L — SIGNIFICANT CHANGE UP (ref 5–17)
APTT BLD: 28 SEC — SIGNIFICANT CHANGE UP (ref 27.5–35.5)
AST SERPL-CCNC: 12 U/L — SIGNIFICANT CHANGE UP (ref 10–40)
BILIRUB SERPL-MCNC: 0.3 MG/DL — SIGNIFICANT CHANGE UP (ref 0.2–1.2)
BUN SERPL-MCNC: 33 MG/DL — HIGH (ref 7–23)
CALCIUM SERPL-MCNC: 7.9 MG/DL — LOW (ref 8.4–10.5)
CHLORIDE SERPL-SCNC: 100 MMOL/L — SIGNIFICANT CHANGE UP (ref 96–108)
CK MB CFR SERPL CALC: 3.4 NG/ML — SIGNIFICANT CHANGE UP (ref 0–3.8)
CO2 SERPL-SCNC: 30 MMOL/L — SIGNIFICANT CHANGE UP (ref 22–31)
CREAT SERPL-MCNC: 0.84 MG/DL — SIGNIFICANT CHANGE UP (ref 0.5–1.3)
EGFR: 66 ML/MIN/1.73M2 — SIGNIFICANT CHANGE UP
GAS PNL BLDV: SIGNIFICANT CHANGE UP
GLUCOSE SERPL-MCNC: 119 MG/DL — HIGH (ref 70–99)
HCT VFR BLD CALC: 29.8 % — LOW (ref 34.5–45)
HGB BLD-MCNC: 9.5 G/DL — LOW (ref 11.5–15.5)
INR BLD: 1.41 RATIO — HIGH (ref 0.88–1.16)
MAGNESIUM SERPL-MCNC: 2.4 MG/DL — SIGNIFICANT CHANGE UP (ref 1.6–2.6)
MCHC RBC-ENTMCNC: 30.2 PG — SIGNIFICANT CHANGE UP (ref 27–34)
MCHC RBC-ENTMCNC: 31.9 GM/DL — LOW (ref 32–36)
MCV RBC AUTO: 94.6 FL — SIGNIFICANT CHANGE UP (ref 80–100)
NRBC # BLD: 0 /100 WBCS — SIGNIFICANT CHANGE UP (ref 0–0)
PHOSPHATE SERPL-MCNC: 5.8 MG/DL — HIGH (ref 2.5–4.5)
PLATELET # BLD AUTO: 240 K/UL — SIGNIFICANT CHANGE UP (ref 150–400)
POTASSIUM SERPL-MCNC: 3.7 MMOL/L — SIGNIFICANT CHANGE UP (ref 3.5–5.3)
POTASSIUM SERPL-SCNC: 3.7 MMOL/L — SIGNIFICANT CHANGE UP (ref 3.5–5.3)
PROT SERPL-MCNC: 6.2 G/DL — SIGNIFICANT CHANGE UP (ref 6–8.3)
PROTHROM AB SERPL-ACNC: 16.4 SEC — HIGH (ref 10.5–13.4)
RBC # BLD: 3.15 M/UL — LOW (ref 3.8–5.2)
RBC # FLD: 15.4 % — HIGH (ref 10.3–14.5)
SODIUM SERPL-SCNC: 142 MMOL/L — SIGNIFICANT CHANGE UP (ref 135–145)
TROPONIN T, HIGH SENSITIVITY RESULT: 63 NG/L — HIGH (ref 0–51)
TROPONIN T, HIGH SENSITIVITY RESULT: 66 NG/L — HIGH (ref 0–51)
WBC # BLD: 8.49 K/UL — SIGNIFICANT CHANGE UP (ref 3.8–10.5)
WBC # FLD AUTO: 8.49 K/UL — SIGNIFICANT CHANGE UP (ref 3.8–10.5)

## 2022-07-12 PROCEDURE — 93010 ELECTROCARDIOGRAM REPORT: CPT

## 2022-07-12 PROCEDURE — 99291 CRITICAL CARE FIRST HOUR: CPT

## 2022-07-12 RX ORDER — ACETAMINOPHEN 500 MG
650 TABLET ORAL EVERY 6 HOURS
Refills: 0 | Status: DISCONTINUED | OUTPATIENT
Start: 2022-07-12 | End: 2022-07-29

## 2022-07-12 RX ADMIN — Medication 25 MILLIGRAM(S): at 05:38

## 2022-07-12 RX ADMIN — Medication 500 MILLIGRAM(S): at 11:09

## 2022-07-12 RX ADMIN — Medication 100 MICROGRAM(S): at 05:38

## 2022-07-12 RX ADMIN — CARBIDOPA AND LEVODOPA 1 TABLET(S): 25; 100 TABLET ORAL at 22:23

## 2022-07-12 RX ADMIN — Medication 25 MILLIGRAM(S): at 13:26

## 2022-07-12 RX ADMIN — PROPOFOL 3.61 MICROGRAM(S)/KG/MIN: 10 INJECTION, EMULSION INTRAVENOUS at 10:16

## 2022-07-12 RX ADMIN — Medication 650 MILLIGRAM(S): at 10:45

## 2022-07-12 RX ADMIN — Medication 1 APPLICATION(S): at 11:11

## 2022-07-12 RX ADMIN — CHLORHEXIDINE GLUCONATE 15 MILLILITER(S): 213 SOLUTION TOPICAL at 17:12

## 2022-07-12 RX ADMIN — CARBIDOPA AND LEVODOPA 1 TABLET(S): 25; 100 TABLET ORAL at 05:38

## 2022-07-12 RX ADMIN — Medication 25 MILLIGRAM(S): at 22:24

## 2022-07-12 RX ADMIN — AMLODIPINE BESYLATE 10 MILLIGRAM(S): 2.5 TABLET ORAL at 05:38

## 2022-07-12 RX ADMIN — APIXABAN 5 MILLIGRAM(S): 2.5 TABLET, FILM COATED ORAL at 17:11

## 2022-07-12 RX ADMIN — CHLORHEXIDINE GLUCONATE 1 APPLICATION(S): 213 SOLUTION TOPICAL at 05:40

## 2022-07-12 RX ADMIN — APIXABAN 5 MILLIGRAM(S): 2.5 TABLET, FILM COATED ORAL at 05:39

## 2022-07-12 RX ADMIN — CEFTRIAXONE 100 MILLIGRAM(S): 500 INJECTION, POWDER, FOR SOLUTION INTRAMUSCULAR; INTRAVENOUS at 15:48

## 2022-07-12 RX ADMIN — Medication 650 MILLIGRAM(S): at 10:16

## 2022-07-12 RX ADMIN — CHLORHEXIDINE GLUCONATE 15 MILLILITER(S): 213 SOLUTION TOPICAL at 05:38

## 2022-07-12 RX ADMIN — Medication 40 MILLIGRAM(S): at 05:39

## 2022-07-12 RX ADMIN — CARBIDOPA AND LEVODOPA 1 TABLET(S): 25; 100 TABLET ORAL at 13:26

## 2022-07-12 RX ADMIN — Medication 1 TABLET(S): at 11:09

## 2022-07-12 RX ADMIN — LOSARTAN POTASSIUM 50 MILLIGRAM(S): 100 TABLET, FILM COATED ORAL at 05:38

## 2022-07-12 NOTE — PROGRESS NOTE ADULT - ASSESSMENT
89 year old female with PMH of Parkinson's disease, HTN, chronic DVT on Eliquis, and hypothyroidism who was admitted for acute hypoxic respiratory failure most likely 2/2 PNA, currently intubated and treated with zosyn and vancomycin (holding for elevated trough level).     Neuro:  - AOx1 on presentation, unknown baseline   - roby on precedex- d/maryam. Weaning off sedation today, will monitor ABG to see if ready for extubation   - unequal pupils on exam since admission  - CT head with no acute intracranial hemorrhage, hydrocephalus or extra-axial fluid collection. mild parenchymal volume loss and mild chronic microvascular ischemic changes    #Parkinson  -continue carbidopa/levodopa via OG tube     Cardiovascular:  #Heart-failure? / fluid overloaded  - BNP 3075 on 7/9  - echo with EF of 55% and mild aortic regurgitation,  paradoxical septal wall motion abnormality due to conduction abnormality  - 40mg IV lasix daily  - monitor I/Os    #Chronic DVT  - CT head negative  - apixaban 5mg q12hrs    #Hypertension  - c/w home amlodipine     Pulmonary:  #Acute Hypoxic Respiratory Failure   - Intubated, Vent (A/C) Volume: 400 Rate: 24 FiO2: 50 PEEP: 5  - alkalotic ABG, continue to monitor  - lowered rate and weaning off sedation, potential extubation if pt tolerates it    #Pneumonia  - c/w Zosyn. Holding Vancomycin (has decubitus ulcer, stage 4 on back) for elevated trough level  -repeat vanc trough at 5 pm  - Consider bronchoscopy for mucus plug   - sputum cultures NGTD    #COPD?  -per guardian, pt does not have a hx of COPD and if she did smoke, it was many years ago   - Uses fluticasone and Duoneb at nursing home, will continue    GI/Nutrition:  - No active issues   - tube feeds via OG tube     Renal:  - Creatinine 0.87  - Monitor urine output, foster  - 40 IV lasix   - f/u Cr, CMP daily    ID:  -Septic W/U for Aspiration PNA, UA +ve UTI, and Stage IV Decubital Wound, Tmax 101.9  -c/w zosyn  - hold Vancomycin due to decubitus ulcer -> elevated trough, repeat at 5 pm  - Nasal Swab for MRSA negative; staph aureus detected  - blood, urine, and sputum cultures NGTD  - if fever curve remains elevated would check RP/Kidney US    Heme:   #anemia  - Hemoglobin 9.5 (10.6) -admitted with 11.4, unclear baseline. No active bleeding   - CBC daily     Endocrine:  #Hypothyroidism  - will continue home med, Synthroid     Skin  #decubitus ulcer, stage 4   - wound care consult  -Sacral wound- Aquacel dressing QD  -BLE elevation  -Abx per MICU  -Moisturize intact skin w/ SWEEN cream BID  -Nutrition Consult for optimization in pt w/ Increased nutritional needs  -Continue turning and positioning w/ offloading assistive devices as per protocol      Ethics:  - FULL CODE pending clarification with guardian. Spoke to guardian on 7/11 after not being able to reach her- she stated that she does not have authority to make decisions on code status unless she goes to court with a doctor. She will try to reach out to nursing home to see if they ever filled out a MOLST form for the patient.     DVT: Eliquis

## 2022-07-12 NOTE — PROGRESS NOTE ADULT - ASSESSMENT
89-Year-old Female PMH Dementia, Parkinson's Disease, Dysphagia, DVT, Muscle Weakness, Gait Instability, Pressure Ulcer, DVT, HTN, HYPOTHYROIDISM, DEPRESSION, PARKINSONS, MOOD DISORDER, DEMENTIA, DECUBITUS ULCERS, SEPSIS, REFLUX, INSOMNIA, CONSTIPATION, HLD who was found hypoxic in SNF last night, she was treated with one dose of IM lasix and IV Zosyn, O2 initially improved but then dropped again to 80s with respiratory distress. She was transferred to [facility] for more evaluation due to acute respiratory failure. She was found with Co2 retention and acidosis. She was started on BIPAP and IV broad abx therapy and was admitted in MICU.     Acute hypercapnic / hypoxic respiratory failure - s/p intubation on AC, sedated with Fentanyl transition to Precedex  f/u with icu as primary team.  Patient still on vent, started weening. On pressure support. Consider to extubate at AM if she tolerates pressure support.   Aspiration pneumonia - D/C Vancomycin and continue Zosyn.   palliative - consult palliative care. Patient is still full code.   depression/anxiety- at home was on  Escitalopram 10 mg. F/u psych   weight loss- improved on puree diet with supplementary diet   sacral wound stage 4- Continue Collagen topical powder and calcium alginate with zinc oxide to periwound. F/u wound care team. Offload pressure.  HTN- home meds as Losartan to 75 mg and Amlodipine 10 mg on hold,  Continue Toprol 50 mg  HLD - home meds as Simvastatin 20 mg on hold   urinary retention - home meds as  urecholine TID on hold   DVT ppx- Eliquis 5 mg BID  Parkinson's - Sinemet 25/100 mg TID  Hypothyroidism- Synthroid to 100 mcg.    89-Year-old Female PMH Dementia, Parkinson's Disease, Dysphagia, DVT, Muscle Weakness, Gait Instability, Pressure Ulcer, DVT, HTN, HYPOTHYROIDISM, DEPRESSION, PARKINSONS, MOOD DISORDER, DEMENTIA, DECUBITUS ULCERS, SEPSIS, REFLUX, INSOMNIA, CONSTIPATION, HLD who was found hypoxic in SNF last night, she was treated with one dose of IM lasix and IV Zosyn, O2 initially improved but then dropped again to 80s with respiratory distress. She was transferred to [facility] for more evaluation due to acute respiratory failure. She was found with Co2 retention and acidosis. She was started on BIPAP and IV broad abx therapy and was admitted in MICU.     Acute hypercapnic / hypoxic respiratory failure - s/p intubation on AC, sedated with Fentanyl transition to Precedex  f/u with icu as primary team.  Patient still on vent, started weaning. On pressure support. Consider to extubate at AM if she tolerates pressure support.   Aspiration pneumonia - D/C Vancomycin and Zosyn ?  HF - ? with elevated proBNP, on lasix 40 mg IV, Echo with normal EF.   palliative - consult palliative care. Patient is still full code.   depression/anxiety- at home was on  Escitalopram 10 mg. F/u psych   weight loss- improved on puree diet with supplementary diet   sacral wound stage 4- Continue Collagen topical powder and calcium alginate with zinc oxide to periwound. F/u wound care team. Offload pressure.  HTN- home meds as Losartan and Amlodipine 10 mg   HLD - home meds as Simvastatin 20 mg on hold   urinary retention - home meds as  urecholine TID on hold   DVT ppx- Eliquis 5 mg BID  Parkinson's - Sinemet 25/100 mg TID  Hypothyroidism- Synthroid to 100 mcg.

## 2022-07-12 NOTE — PROGRESS NOTE ADULT - SUBJECTIVE AND OBJECTIVE BOX
Patient is a 89y old  Female who presents with a chief complaint of Shortness of breath (12 Jul 2022 07:15)      INTERVAL HPI/OVERNIGHT EVENTS: No event. Patient is on CPAP trying to ween her off of ventilation. She is tolerating well, continue weening. Patient is off of sedation, awake and responsive. Discontinue Vancomycin due to elevated Vanco trough, continue Zosyn. Palliative care is on  board. I discussed with primary ICU team who spoke with guardian regarding code status and palliative but she does not have any power to made decision about changing code status. Continue full code for now, palliative on board to consider taking case to court. According to ICU team she has high risk for reintubation  after extubation.    Pain Location & Control:  OK    MEDICATIONS  (STANDING):  amLODIPine   Tablet 10 milliGRAM(s) Oral daily  apixaban 5 milliGRAM(s) Oral every 12 hours  ascorbic acid 500 milliGRAM(s) Oral daily  bethanechol 25 milliGRAM(s) Oral three times a day  carbidopa/levodopa  25/100 1 Tablet(s) Oral three times a day  chlorhexidine 0.12% Liquid 15 milliLiter(s) Oral Mucosa every 12 hours  chlorhexidine 4% Liquid 1 Application(s) Topical <User Schedule>  furosemide   Injectable 40 milliGRAM(s) IV Push daily  levothyroxine 100 MICROGram(s) Oral daily  losartan 50 milliGRAM(s) Oral daily  multivitamin 1 Tablet(s) Oral daily    MEDICATIONS  (PRN):  acetaminophen     Tablet .. 650 milliGRAM(s) Oral every 6 hours PRN Temp greater or equal to 38C (100.4F), Mild Pain (1 - 3)      Allergies    No Known Allergies    Intolerances        REVIEW OF SYSTEMS:  CONSTITUTIONAL: No fever, weight loss, or fatigue  EYES: No eye pain, visual disturbances, or discharge  ENMT:  No difficulty hearing, tinnitus, vertigo; No sinus or throat pain  NECK: No pain or stiffness  BREASTS: No pain, masses, or nipple discharge  RESPIRATORY: No cough, wheezing, chills or hemoptysis; No shortness of breath  CARDIOVASCULAR: No chest pain, palpitations, dizziness, or leg swelling  GASTROINTESTINAL: No abdominal or epigastric pain. No nausea, vomiting, or hematemesis; No diarrhea or constipation. No melena or hematochezia.  GENITOURINARY: No dysuria, frequency, hematuria, or incontinence  NEUROLOGICAL: No headaches, memory loss, loss of strength, numbness, or tremors  SKIN: No itching, burning, rashes, or lesions   LYMPH NODES: No enlarged glands  ENDOCRINE: No heat or cold intolerance; No hair loss; No polydipsia or polyuria  MUSCULOSKELETAL: No back pain  PSYCHIATRIC: No depression, anxiety, mood swings, or difficulty sleeping  HEME/LYMPH: No easy bruising, or bleeding gums  ALLERGY AND IMMUNOLOGIC: No hives or eczema    Vital Signs Last 24 Hrs  T(C): 37.3 (12 Jul 2022 12:00), Max: 37.5 (12 Jul 2022 00:00)  T(F): 99.1 (12 Jul 2022 12:00), Max: 99.5 (12 Jul 2022 00:00)  HR: 66 (12 Jul 2022 15:05) (58 - 71)  BP: 97/53 (12 Jul 2022 15:00) (97/53 - 176/77)  BP(mean): 72 (12 Jul 2022 15:00) (72 - 111)  RR: 18 (12 Jul 2022 15:00) (12 - 25)  SpO2: 98% (12 Jul 2022 15:05) (92% - 100%)    Parameters below as of 12 Jul 2022 07:00  Patient On (Oxygen Delivery Method): ventilator    O2 Concentration (%): 21    PHYSICAL EXAM:  GENERAL: NAD, well-groomed, well-developed  HEAD:  Atraumatic, Normocephalic  EYES: EOMI, PERRLA, conjunctiva and sclera clear  ENMT: No tonsillar erythema, exudates, or enlargement; Moist mucous membranes, Good dentition, No lesions  NECK: Supple, No JVD, Normal thyroid  NERVOUS SYSTEM:  Alert & Oriented X3, Good concentration; Motor Strength 5/5 B/L upper and lower extremities; DTRs 2+ intact and symmetric  CHEST/LUNG: Clear to auscultation bilaterally; No rales, rhonchi, wheezing, or rubs  HEART: Regular rate and rhythm; No murmurs, rubs, or gallops  ABDOMEN: Soft, Nontender, Nondistended; Bowel sounds present  EXTREMITIES:  2+ Peripheral Pulses, No clubbing or cyanosis  LYMPH: No lymphadenopathy noted  SKIN: No rashes or lesions      LABS:                        9.5    8.49  )-----------( 240      ( 12 Jul 2022 00:32 )             29.8     12 Jul 2022 00:32    142    |  100    |  33     ----------------------------<  119    3.7     |  30     |  0.84     Ca    7.9        12 Jul 2022 00:32  Phos  5.8       12 Jul 2022 00:32  Mg     2.4       12 Jul 2022 00:32    TPro  6.2    /  Alb  3.1    /  TBili  0.3    /  DBili  x      /  AST  12     /  ALT  <5     /  AlkPhos  59     12 Jul 2022 00:32    PT/INR - ( 12 Jul 2022 00:32 )   PT: 16.4 sec;   INR: 1.41 ratio         PTT - ( 12 Jul 2022 00:32 )  PTT:28.0 sec    CAPILLARY BLOOD GLUCOSE            Cultures  Culture Results:   No growth to date. (07-09-22 @ 02:09)  Culture Results:   >100,000 CFU/ml Escherichia coli (07-09-22 @ 01:37)  Culture Results:   No growth to date. (07-09-22 @ 00:30)      RADIOLOGY & ADDITIONAL TESTS:    Imaging Personally Reviewed:  [X ] YES  [ ] NO    Consultant(s) Notes Reviewed:  [X ] YES  [ ] NO    Care Discussed with Consultants/Other Providers [X ] YES  [ ] NO Patient is a 89y old  Female who presents with a chief complaint of Shortness of breath (12 Jul 2022 07:15)      INTERVAL HPI/OVERNIGHT EVENTS: No event. Patient is on CPAP trying to ween her off of ventilation. She is tolerating well, continue weaning. Patient is off of sedation, awake and responsive. Discontinue Vancomycin due to elevated Vanco trough, continue Zosyn. Palliative care is on  board. I discussed with primary ICU team who spoke with guardian regarding code status and palliative but she does not have any power to makee decision about changing code status. Continue full code for now, palliative on board to consider taking case to the court. According to ICU team she has high risk for reintubation  after extubation.    Pain Location & Control:  OK    MEDICATIONS  (STANDING):  amLODIPine   Tablet 10 milliGRAM(s) Oral daily  apixaban 5 milliGRAM(s) Oral every 12 hours  ascorbic acid 500 milliGRAM(s) Oral daily  bethanechol 25 milliGRAM(s) Oral three times a day  carbidopa/levodopa  25/100 1 Tablet(s) Oral three times a day  chlorhexidine 0.12% Liquid 15 milliLiter(s) Oral Mucosa every 12 hours  chlorhexidine 4% Liquid 1 Application(s) Topical <User Schedule>  furosemide   Injectable 40 milliGRAM(s) IV Push daily  levothyroxine 100 MICROGram(s) Oral daily  losartan 50 milliGRAM(s) Oral daily  multivitamin 1 Tablet(s) Oral daily    MEDICATIONS  (PRN):  acetaminophen     Tablet .. 650 milliGRAM(s) Oral every 6 hours PRN Temp greater or equal to 38C (100.4F), Mild Pain (1 - 3)      Allergies    No Known Allergies    Intolerances        REVIEW OF SYSTEMS:  UTO, intubated     Vital Signs Last 24 Hrs  T(C): 37.3 (12 Jul 2022 12:00), Max: 37.5 (12 Jul 2022 00:00)  T(F): 99.1 (12 Jul 2022 12:00), Max: 99.5 (12 Jul 2022 00:00)  HR: 66 (12 Jul 2022 15:05) (58 - 71)  BP: 97/53 (12 Jul 2022 15:00) (97/53 - 176/77)  BP(mean): 72 (12 Jul 2022 15:00) (72 - 111)  RR: 18 (12 Jul 2022 15:00) (12 - 25)  SpO2: 98% (12 Jul 2022 15:05) (92% - 100%)    Parameters below as of 12 Jul 2022 07:00  Patient On (Oxygen Delivery Method): ventilator    O2 Concentration (%): 21    PHYSICAL EXAM:  GENERAL: NAD, well-groomed, well-developed  HEAD:  Atraumatic, Normocephalic  EYES: EOMI, PERRLA, conjunctiva and sclera clear  ENMT: No tonsillar erythema, exudates, or enlargement; Moist mucous membranes, Good dentition, No lesions  NECK: Supple, No JVD, Normal thyroid  NERVOUS SYSTEM:  Alert & Oriented X1; weakness   CHEST/LUNG: Clear to auscultation bilaterally; No rales, rhonchi, wheezing, or rubs  HEART: Regular rate and rhythm; No murmurs, rubs, or gallops  ABDOMEN: Soft, Nontender, Nondistended; Bowel sounds present  EXTREMITIES:  2+ Peripheral Pulses, No clubbing or cyanosis  LYMPH: No lymphadenopathy noted      LABS:                        9.5    8.49  )-----------( 240      ( 12 Jul 2022 00:32 )             29.8     12 Jul 2022 00:32    142    |  100    |  33     ----------------------------<  119    3.7     |  30     |  0.84     Ca    7.9        12 Jul 2022 00:32  Phos  5.8       12 Jul 2022 00:32  Mg     2.4       12 Jul 2022 00:32    TPro  6.2    /  Alb  3.1    /  TBili  0.3    /  DBili  x      /  AST  12     /  ALT  <5     /  AlkPhos  59     12 Jul 2022 00:32    PT/INR - ( 12 Jul 2022 00:32 )   PT: 16.4 sec;   INR: 1.41 ratio         PTT - ( 12 Jul 2022 00:32 )  PTT:28.0 sec    CAPILLARY BLOOD GLUCOSE            Cultures  Culture Results:   No growth to date. (07-09-22 @ 02:09)  Culture Results:   >100,000 CFU/ml Escherichia coli (07-09-22 @ 01:37)  Culture Results:   No growth to date. (07-09-22 @ 00:30)      RADIOLOGY & ADDITIONAL TESTS:    Imaging Personally Reviewed:  [X ] YES  [ ] NO    Consultant(s) Notes Reviewed:  [X ] YES  [ ] NO    Care Discussed with Consultants/Other Providers [X ] YES  [ ] NO

## 2022-07-12 NOTE — PROGRESS NOTE ADULT - SUBJECTIVE AND OBJECTIVE BOX
Subjective / Overnight Events: No acute events overnight.     Additional ROS (if any):    MEDICATIONS  (STANDING):  amLODIPine   Tablet 10 milliGRAM(s) Oral daily  apixaban 5 milliGRAM(s) Oral every 12 hours  bethanechol 25 milliGRAM(s) Oral three times a day  carbidopa/levodopa  25/100 1 Tablet(s) Oral three times a day  chlorhexidine 0.12% Liquid 15 milliLiter(s) Oral Mucosa every 12 hours  chlorhexidine 4% Liquid 1 Application(s) Topical <User Schedule>  collagenase Ointment 1 Application(s) Topical daily  dexMEDEtomidine Infusion 0.2 MICROgram(s)/kG/Hr (3.06 mL/Hr) IV Continuous <Continuous>  fentaNYL   Infusion... 1 MICROgram(s)/kG/Hr (3.06 mL/Hr) IV Continuous <Continuous>  furosemide    Tablet 40 milliGRAM(s) Oral daily  levothyroxine 100 MICROGram(s) Oral daily  losartan 50 milliGRAM(s) Oral daily  metoprolol succinate ER 50 milliGRAM(s) Oral daily  mometasone 220 MICROgram(s) Inhaler 1 Puff(s) Inhalation daily  piperacillin/tazobactam IVPB. 3.375 Gram(s) IV Intermittent once  piperacillin/tazobactam IVPB.. 3.375 Gram(s) IV Intermittent every 8 hours  vancomycin  IVPB 1000 milliGRAM(s) IV Intermittent every 12 hours    MEDICATIONS  (PRN):    POCT  Blood Glucose (07.10.22 @ 17:13)    POCT Blood Glucose.: 116 mg/dL    I&O's Summary    11 Jul 2022 07:01  -  12 Jul 2022 07:00  --------------------------------------------------------  IN: 1248.6 mL / OUT: 375 mL / NET: 873.6 mL      ICU Vital Signs Last 24 Hrs  T(C): 37.2 (12 Jul 2022 04:00), Max: 37.5 (11 Jul 2022 12:00)  T(F): 99 (12 Jul 2022 04:00), Max: 99.5 (11 Jul 2022 12:00)  HR: 59 (12 Jul 2022 06:00) (58 - 71)  BP: 112/53 (12 Jul 2022 06:00) (98/55 - 176/77)  BP(mean): 77 (12 Jul 2022 06:00) (72 - 111)  ABP: --  ABP(mean): --  RR: 16 (12 Jul 2022 06:00) (12 - 25)  SpO2: 98% (12 Jul 2022 06:00) (90% - 99%)    O2 Parameters below as of 11 Jul 2022 19:00  Patient On (Oxygen Delivery Method): ventilator    O2 Concentration (%): 21      PHYSICAL EXAM:    General: Patient is intubated. No apparent distress.   HEENT: PERRLA, EOMi, no scleral icterus  CV: RRR, normal S1 and S2, no m/r/g  Lungs: normal respiratory effort. CTAB, no wheezes, rales, or rhonchi  Abd: soft, nontender, nondistended  Ext: no edema, 2+ peripheral pulses   Pysch: Intubated.  Neuro: grossly non-focal, moving all extremities spontaneously   Skin: no rashes or lesions     LABS:      CBC Full  -  ( 12 Jul 2022 00:32 )  WBC Count : 8.49 K/uL  RBC Count : 3.15 M/uL  Hemoglobin : 9.5 g/dL  Hematocrit : 29.8 %  Platelet Count - Automated : 240 K/uL  Mean Cell Volume : 94.6 fl  Mean Cell Hemoglobin : 30.2 pg  Mean Cell Hemoglobin Concentration : 31.9 gm/dL  Auto Neutrophil # : x  Auto Lymphocyte # : x  Auto Monocyte # : x  Auto Eosinophil # : x  Auto Basophil # : x  Auto Neutrophil % : x  Auto Lymphocyte % : x  Auto Monocyte % : x  Auto Eosinophil % : x  Auto Basophil % : x    07-12    142  |  100  |  33<H>  ----------------------------<  119<H>  3.7   |  30  |  0.84    Ca    7.9<L>      12 Jul 2022 00:32  Phos  5.8     07-12  Mg     2.4     07-12    TPro  6.2  /  Alb  3.1<L>  /  TBili  0.3  /  DBili  x   /  AST  12  /  ALT  <5<L>  /  AlkPhos  59  07-12    Staph aureus PCR Result: Detected (07.11.22 @ 11:58)    MRSA/MSSA PCR (07.11.22 @ 11:58)    MRSA PCR Result.: NotDetec: MRSA/MSSA PCR assay is a qualitative in vitro diagnostic test for the  direct detection and differentiation of methicillin-resistant  Staphylococcus aureus (MRSA) from Staphylococcus aureus (SA). The assay  detects DNA from nasal swabs in patients atrisk for nasal colonization.  It is not intended to diagnose MRSA or SA infections nor guide or monitor  treatment for MRSA/SA infections. A negative result does not preclude  nasal colonization. The Real-Time PCR assay is FDA-approved, and its  performance has been established by [facility] [place], [place], [place].      RADIOLOGY & ADDITIONAL TESTS: Reviewed

## 2022-07-12 NOTE — CHART NOTE - NSCHARTNOTEFT_GEN_A_CORE
Case reviewed.  Please obtain guardianship paperwork as this will dictate to the extent a guardian can make medical decisions on patients behalf.  Full consult to follow once paperwork obtained as this may require ultimate petition to court.  Discussed with Dr. Carrasquillo.  [phone]

## 2022-07-13 DIAGNOSIS — R53.2 FUNCTIONAL QUADRIPLEGIA: ICD-10-CM

## 2022-07-13 DIAGNOSIS — Z71.89 OTHER SPECIFIED COUNSELING: ICD-10-CM

## 2022-07-13 DIAGNOSIS — Z51.5 ENCOUNTER FOR PALLIATIVE CARE: ICD-10-CM

## 2022-07-13 DIAGNOSIS — J96.01 ACUTE RESPIRATORY FAILURE WITH HYPOXIA: ICD-10-CM

## 2022-07-13 LAB
ALBUMIN SERPL ELPH-MCNC: 3.4 G/DL — SIGNIFICANT CHANGE UP (ref 3.3–5)
ALP SERPL-CCNC: 58 U/L — SIGNIFICANT CHANGE UP (ref 40–120)
ALT FLD-CCNC: <5 U/L — LOW (ref 10–45)
ANION GAP SERPL CALC-SCNC: 12 MMOL/L — SIGNIFICANT CHANGE UP (ref 5–17)
APTT BLD: 29.6 SEC — SIGNIFICANT CHANGE UP (ref 27.5–35.5)
AST SERPL-CCNC: 12 U/L — SIGNIFICANT CHANGE UP (ref 10–40)
BASE EXCESS BLDV CALC-SCNC: 12.2 MMOL/L — HIGH (ref -2–2)
BILIRUB SERPL-MCNC: 0.3 MG/DL — SIGNIFICANT CHANGE UP (ref 0.2–1.2)
BUN SERPL-MCNC: 32 MG/DL — HIGH (ref 7–23)
CA-I SERPL-SCNC: 1.08 MMOL/L — LOW (ref 1.15–1.33)
CALCIUM SERPL-MCNC: 8.3 MG/DL — LOW (ref 8.4–10.5)
CHLORIDE BLDV-SCNC: 100 MMOL/L — SIGNIFICANT CHANGE UP (ref 96–108)
CHLORIDE SERPL-SCNC: 101 MMOL/L — SIGNIFICANT CHANGE UP (ref 96–108)
CO2 BLDV-SCNC: 39 MMOL/L — HIGH (ref 22–26)
CO2 SERPL-SCNC: 31 MMOL/L — SIGNIFICANT CHANGE UP (ref 22–31)
CREAT SERPL-MCNC: 0.73 MG/DL — SIGNIFICANT CHANGE UP (ref 0.5–1.3)
EGFR: 78 ML/MIN/1.73M2 — SIGNIFICANT CHANGE UP
GAS PNL BLDA: SIGNIFICANT CHANGE UP
GAS PNL BLDV: 140 MMOL/L — SIGNIFICANT CHANGE UP (ref 136–145)
GAS PNL BLDV: SIGNIFICANT CHANGE UP
GAS PNL BLDV: SIGNIFICANT CHANGE UP
GLUCOSE BLDV-MCNC: 125 MG/DL — HIGH (ref 70–99)
GLUCOSE SERPL-MCNC: 137 MG/DL — HIGH (ref 70–99)
HCO3 BLDV-SCNC: 38 MMOL/L — HIGH (ref 22–29)
HCT VFR BLD CALC: 30.9 % — LOW (ref 34.5–45)
HCT VFR BLDA CALC: 30 % — LOW (ref 34.5–46.5)
HGB BLD CALC-MCNC: 10.1 G/DL — LOW (ref 11.7–16.1)
HGB BLD-MCNC: 9.7 G/DL — LOW (ref 11.5–15.5)
HOROWITZ INDEX BLDV+IHG-RTO: 21 — SIGNIFICANT CHANGE UP
INR BLD: 1.42 RATIO — HIGH (ref 0.88–1.16)
LACTATE BLDV-MCNC: 0.7 MMOL/L — SIGNIFICANT CHANGE UP (ref 0.7–2)
MAGNESIUM SERPL-MCNC: 2.5 MG/DL — SIGNIFICANT CHANGE UP (ref 1.6–2.6)
MCHC RBC-ENTMCNC: 29.6 PG — SIGNIFICANT CHANGE UP (ref 27–34)
MCHC RBC-ENTMCNC: 31.4 GM/DL — LOW (ref 32–36)
MCV RBC AUTO: 94.2 FL — SIGNIFICANT CHANGE UP (ref 80–100)
NRBC # BLD: 0 /100 WBCS — SIGNIFICANT CHANGE UP (ref 0–0)
PCO2 BLDV: 53 MMHG — HIGH (ref 39–42)
PH BLDV: 7.46 — HIGH (ref 7.32–7.43)
PHOSPHATE SERPL-MCNC: 5.1 MG/DL — HIGH (ref 2.5–4.5)
PLATELET # BLD AUTO: 242 K/UL — SIGNIFICANT CHANGE UP (ref 150–400)
PO2 BLDV: 44 MMHG — SIGNIFICANT CHANGE UP (ref 25–45)
POTASSIUM BLDV-SCNC: 3.3 MMOL/L — LOW (ref 3.5–5.1)
POTASSIUM SERPL-MCNC: 3.4 MMOL/L — LOW (ref 3.5–5.3)
POTASSIUM SERPL-SCNC: 3.4 MMOL/L — LOW (ref 3.5–5.3)
PROT SERPL-MCNC: 6.6 G/DL — SIGNIFICANT CHANGE UP (ref 6–8.3)
PROTHROM AB SERPL-ACNC: 16.4 SEC — HIGH (ref 10.5–13.4)
RBC # BLD: 3.28 M/UL — LOW (ref 3.8–5.2)
RBC # FLD: 15.3 % — HIGH (ref 10.3–14.5)
SAO2 % BLDV: 75.7 % — SIGNIFICANT CHANGE UP (ref 67–88)
SODIUM SERPL-SCNC: 144 MMOL/L — SIGNIFICANT CHANGE UP (ref 135–145)
WBC # BLD: 9.98 K/UL — SIGNIFICANT CHANGE UP (ref 3.8–10.5)
WBC # FLD AUTO: 9.98 K/UL — SIGNIFICANT CHANGE UP (ref 3.8–10.5)

## 2022-07-13 PROCEDURE — 99223 1ST HOSP IP/OBS HIGH 75: CPT | Mod: GC

## 2022-07-13 PROCEDURE — 99497 ADVNCD CARE PLAN 30 MIN: CPT | Mod: 25

## 2022-07-13 PROCEDURE — 99291 CRITICAL CARE FIRST HOUR: CPT

## 2022-07-13 RX ORDER — IPRATROPIUM/ALBUTEROL SULFATE 18-103MCG
3 AEROSOL WITH ADAPTER (GRAM) INHALATION EVERY 6 HOURS
Refills: 0 | Status: DISCONTINUED | OUTPATIENT
Start: 2022-07-13 | End: 2022-07-29

## 2022-07-13 RX ORDER — ALBUTEROL 90 UG/1
AEROSOL, METERED ORAL
Refills: 0 | Status: DISCONTINUED | OUTPATIENT
Start: 2022-07-13 | End: 2022-07-29

## 2022-07-13 RX ORDER — FUROSEMIDE 40 MG
40 TABLET ORAL DAILY
Refills: 0 | Status: DISCONTINUED | OUTPATIENT
Start: 2022-07-14 | End: 2022-07-19

## 2022-07-13 RX ORDER — POTASSIUM CHLORIDE 20 MEQ
40 PACKET (EA) ORAL ONCE
Refills: 0 | Status: COMPLETED | OUTPATIENT
Start: 2022-07-13 | End: 2022-07-13

## 2022-07-13 RX ORDER — PIPERACILLIN AND TAZOBACTAM 4; .5 G/20ML; G/20ML
3.38 INJECTION, POWDER, LYOPHILIZED, FOR SOLUTION INTRAVENOUS EVERY 8 HOURS
Refills: 0 | Status: COMPLETED | OUTPATIENT
Start: 2022-07-13 | End: 2022-07-15

## 2022-07-13 RX ORDER — PIPERACILLIN AND TAZOBACTAM 4; .5 G/20ML; G/20ML
3.38 INJECTION, POWDER, LYOPHILIZED, FOR SOLUTION INTRAVENOUS ONCE
Refills: 0 | Status: COMPLETED | OUTPATIENT
Start: 2022-07-13 | End: 2022-07-13

## 2022-07-13 RX ADMIN — Medication 25 MILLIGRAM(S): at 21:35

## 2022-07-13 RX ADMIN — CARBIDOPA AND LEVODOPA 1 TABLET(S): 25; 100 TABLET ORAL at 14:36

## 2022-07-13 RX ADMIN — Medication 650 MILLIGRAM(S): at 00:45

## 2022-07-13 RX ADMIN — CHLORHEXIDINE GLUCONATE 15 MILLILITER(S): 213 SOLUTION TOPICAL at 05:23

## 2022-07-13 RX ADMIN — APIXABAN 5 MILLIGRAM(S): 2.5 TABLET, FILM COATED ORAL at 18:07

## 2022-07-13 RX ADMIN — CHLORHEXIDINE GLUCONATE 1 APPLICATION(S): 213 SOLUTION TOPICAL at 05:24

## 2022-07-13 RX ADMIN — Medication 1 TABLET(S): at 12:49

## 2022-07-13 RX ADMIN — APIXABAN 5 MILLIGRAM(S): 2.5 TABLET, FILM COATED ORAL at 05:23

## 2022-07-13 RX ADMIN — Medication 650 MILLIGRAM(S): at 00:15

## 2022-07-13 RX ADMIN — Medication 25 MILLIGRAM(S): at 05:23

## 2022-07-13 RX ADMIN — Medication 3 MILLILITER(S): at 23:26

## 2022-07-13 RX ADMIN — Medication 40 MILLIGRAM(S): at 05:22

## 2022-07-13 RX ADMIN — Medication 500 MILLIGRAM(S): at 12:49

## 2022-07-13 RX ADMIN — LOSARTAN POTASSIUM 50 MILLIGRAM(S): 100 TABLET, FILM COATED ORAL at 05:23

## 2022-07-13 RX ADMIN — Medication 3 MILLILITER(S): at 17:01

## 2022-07-13 RX ADMIN — Medication 40 MILLIEQUIVALENT(S): at 05:28

## 2022-07-13 RX ADMIN — AMLODIPINE BESYLATE 10 MILLIGRAM(S): 2.5 TABLET ORAL at 05:23

## 2022-07-13 RX ADMIN — CHLORHEXIDINE GLUCONATE 15 MILLILITER(S): 213 SOLUTION TOPICAL at 18:06

## 2022-07-13 RX ADMIN — CARBIDOPA AND LEVODOPA 1 TABLET(S): 25; 100 TABLET ORAL at 21:35

## 2022-07-13 RX ADMIN — PIPERACILLIN AND TAZOBACTAM 25 GRAM(S): 4; .5 INJECTION, POWDER, LYOPHILIZED, FOR SOLUTION INTRAVENOUS at 18:07

## 2022-07-13 RX ADMIN — Medication 25 MILLIGRAM(S): at 14:35

## 2022-07-13 RX ADMIN — Medication 100 MICROGRAM(S): at 05:23

## 2022-07-13 RX ADMIN — PIPERACILLIN AND TAZOBACTAM 200 GRAM(S): 4; .5 INJECTION, POWDER, LYOPHILIZED, FOR SOLUTION INTRAVENOUS at 12:51

## 2022-07-13 RX ADMIN — CARBIDOPA AND LEVODOPA 1 TABLET(S): 25; 100 TABLET ORAL at 05:23

## 2022-07-13 NOTE — PROGRESS NOTE ADULT - ASSESSMENT
89-Year-old Female PMH Dementia, Parkinson's Disease, Dysphagia, DVT, Muscle Weakness, Gait Instability, Pressure Ulcer, DVT, HTN, HYPOTHYROIDISM, DEPRESSION, PARKINSONS, MOOD DISORDER, DEMENTIA, DECUBITUS ULCERS, SEPSIS, REFLUX, INSOMNIA, CONSTIPATION, HLD who was found hypoxic in SNF last night, she was treated with one dose of IM lasix and IV Zosyn, O2 initially improved but then dropped again to 80s with respiratory distress. She was transferred to [facility] for more evaluation due to acute respiratory failure. She was found with Co2 retention and acidosis. She was started on BIPAP and IV broad abx therapy and was admitted in MICU.     Acute hypercapnic / hypoxic respiratory failure - s/p intubation on AC, sedated with Fentanyl transition to Precedex  f/u with icu as primary team.  Patient still on vent, started weaning. On pressure support. Consider to extubate at AM if she tolerates pressure support.   Aspiration pneumonia - D/C Vancomycin and Zosyn ?  HF - ? with elevated proBNP, on lasix 40 mg IV, Echo with normal EF.   palliative - consult palliative care. Patient is still full code.   depression/anxiety- at home was on  Escitalopram 10 mg. F/u psych   weight loss- improved on puree diet with supplementary diet   sacral wound stage 4- Continue Collagen topical powder and calcium alginate with zinc oxide to periwound. F/u wound care team. Offload pressure.  HTN- home meds as Losartan and Amlodipine 10 mg   HLD - home meds as Simvastatin 20 mg on hold   urinary retention - home meds as  urecholine TID on hold   DVT ppx- Eliquis 5 mg BID  Parkinson's - Sinemet 25/100 mg TID  Hypothyroidism- Synthroid to 100 mcg.

## 2022-07-13 NOTE — CONSULT NOTE ADULT - PROBLEM SELECTOR RECOMMENDATION 4
See [facility] as above.  Legal Guardian: Evaristo Reddy. Paperwork in chart.  All medical decisions must go through her but she has indicated that she cannot make any decisions regarding resuscitation or intubation and would need to petition the court but is unavailable to do so for the next 3 weeks.  - May require ethics consult given that guardian is unavailable to partake in full decision making for patient for 3 week period. Following for GOC. Patient with a legal guardian.

## 2022-07-13 NOTE — CONSULT NOTE ADULT - PROBLEM SELECTOR RECOMMENDATION 9
Requiring intubation, secondary to pneumonia.  - MICU team hoping for medical extubation  - Full Code

## 2022-07-13 NOTE — PROGRESS NOTE ADULT - SUBJECTIVE AND OBJECTIVE BOX
Subjective / Overnight Events: No acute events overnight.     Additional ROS (if any):    MEDICATIONS  (STANDING):  amLODIPine   Tablet 10 milliGRAM(s) Oral daily  apixaban 5 milliGRAM(s) Oral every 12 hours  bethanechol 25 milliGRAM(s) Oral three times a day  carbidopa/levodopa  25/100 1 Tablet(s) Oral three times a day  chlorhexidine 0.12% Liquid 15 milliLiter(s) Oral Mucosa every 12 hours  chlorhexidine 4% Liquid 1 Application(s) Topical <User Schedule>  collagenase Ointment 1 Application(s) Topical daily  dexMEDEtomidine Infusion 0.2 MICROgram(s)/kG/Hr (3.06 mL/Hr) IV Continuous <Continuous>  fentaNYL   Infusion... 1 MICROgram(s)/kG/Hr (3.06 mL/Hr) IV Continuous <Continuous>  furosemide    Tablet 40 milliGRAM(s) Oral daily  levothyroxine 100 MICROGram(s) Oral daily  losartan 50 milliGRAM(s) Oral daily  metoprolol succinate ER 50 milliGRAM(s) Oral daily  mometasone 220 MICROgram(s) Inhaler 1 Puff(s) Inhalation daily  piperacillin/tazobactam IVPB. 3.375 Gram(s) IV Intermittent once  piperacillin/tazobactam IVPB.. 3.375 Gram(s) IV Intermittent every 8 hours  vancomycin  IVPB 1000 milliGRAM(s) IV Intermittent every 12 hours    MEDICATIONS  (PRN):    POCT  Blood Glucose (07.10.22 @ 17:13)    POCT Blood Glucose.: 116 mg/dL    I&O's Summary    12 Jul 2022 07:01  -  13 Jul 2022 07:00  --------------------------------------------------------  IN: 1134.4 mL / OUT: 550 mL / NET: 584.4 mL    ICU Vital Signs Last 24 Hrs  T(C): 37.6 (13 Jul 2022 04:00), Max: 38 (13 Jul 2022 00:00)  T(F): 99.7 (13 Jul 2022 04:00), Max: 100.4 (13 Jul 2022 00:00)  HR: 65 (13 Jul 2022 06:00) (59 - 75)  BP: 115/57 (13 Jul 2022 06:00) (97/53 - 135/62)  BP(mean): 82 (13 Jul 2022 06:00) (72 - 96)  ABP: --  ABP(mean): --  RR: 22 (13 Jul 2022 06:00) (17 - 26)  SpO2: 95% (13 Jul 2022 06:00) (90% - 100%)    O2 Parameters below as of 12 Jul 2022 20:00  Patient On (Oxygen Delivery Method): ventilator    O2 Concentration (%): 21        PHYSICAL EXAM:    General: Patient is intubated. No apparent distress.   HEENT: PERRLA, EOMi, no scleral icterus  CV: RRR, normal S1 and S2, no m/r/g  Lungs: normal respiratory effort. CTAB, no wheezes, rales, or rhonchi  Abd: soft, nontender, nondistended  Ext: no edema, 2+ peripheral pulses   Pysch: Intubated.  Neuro: grossly non-focal, moving all extremities spontaneously   Skin: no rashes or lesions     LABS:    CBC Full  -  ( 13 Jul 2022 00:18 )  WBC Count : 9.98 K/uL  RBC Count : 3.28 M/uL  Hemoglobin : 9.7 g/dL  Hematocrit : 30.9 %  Platelet Count - Automated : 242 K/uL  Mean Cell Volume : 94.2 fl  Mean Cell Hemoglobin : 29.6 pg  Mean Cell Hemoglobin Concentration : 31.4 gm/dL  Auto Neutrophil # : x  Auto Lymphocyte # : x  Auto Monocyte # : x  Auto Eosinophil # : x  Auto Basophil # : x  Auto Neutrophil % : x  Auto Lymphocyte % : x  Auto Monocyte % : x  Auto Eosinophil % : x  Auto Basophil % : x    07-13    144  |  101  |  32<H>  ----------------------------<  137<H>  3.4<L>   |  31  |  0.73    Ca    8.3<L>      13 Jul 2022 00:16  Phos  5.1     07-13  Mg     2.5     07-13    TPro  6.6  /  Alb  3.4  /  TBili  0.3  /  DBili  x   /  AST  12  /  ALT  <5<L>  /  AlkPhos  58  07-13      MRSA/MSSA PCR (07.11.22 @ 11:58)    MRSA PCR Result.: NotDetec: MRSA/MSSA PCR assay is a qualitative in vitro diagnostic test for the  direct detection and differentiation of methicillin-resistant  Staphylococcus aureus (MRSA) from Staphylococcus aureus (SA). The assay  detects DNA from nasal swabs in patients atrisk for nasal colonization.  It is not intended to diagnose MRSA or SA infections nor guide or monitor  treatment for MRSA/SA infections. A negative result does not preclude  nasal colonization. The Real-Time PCR assay is FDA-approved, and its  performance has been established by [facility], [place], [place].      RADIOLOGY & ADDITIONAL TESTS: Reviewed   Subjective / Overnight Events: Fever of 100.4 overnight, sent out blood cultures. Pt had finished ceftriaxone yesterday.     Additional ROS (if any):    MEDICATIONS  (STANDING):  amLODIPine   Tablet 10 milliGRAM(s) Oral daily  apixaban 5 milliGRAM(s) Oral every 12 hours  bethanechol 25 milliGRAM(s) Oral three times a day  carbidopa/levodopa  25/100 1 Tablet(s) Oral three times a day  chlorhexidine 0.12% Liquid 15 milliLiter(s) Oral Mucosa every 12 hours  chlorhexidine 4% Liquid 1 Application(s) Topical <User Schedule>  collagenase Ointment 1 Application(s) Topical daily  dexMEDEtomidine Infusion 0.2 MICROgram(s)/kG/Hr (3.06 mL/Hr) IV Continuous <Continuous>  fentaNYL   Infusion... 1 MICROgram(s)/kG/Hr (3.06 mL/Hr) IV Continuous <Continuous>  furosemide    Tablet 40 milliGRAM(s) Oral daily  levothyroxine 100 MICROGram(s) Oral daily  losartan 50 milliGRAM(s) Oral daily  metoprolol succinate ER 50 milliGRAM(s) Oral daily  mometasone 220 MICROgram(s) Inhaler 1 Puff(s) Inhalation daily  piperacillin/tazobactam IVPB. 3.375 Gram(s) IV Intermittent once  piperacillin/tazobactam IVPB.. 3.375 Gram(s) IV Intermittent every 8 hours  vancomycin  IVPB 1000 milliGRAM(s) IV Intermittent every 12 hours    MEDICATIONS  (PRN):    POCT  Blood Glucose (07.10.22 @ 17:13)    POCT Blood Glucose.: 116 mg/dL    I&O's Summary    12 Jul 2022 07:01  -  13 Jul 2022 07:00  --------------------------------------------------------  IN: 1134.4 mL / OUT: 550 mL / NET: 584.4 mL    ICU Vital Signs Last 24 Hrs  T(C): 37.6 (13 Jul 2022 04:00), Max: 38 (13 Jul 2022 00:00)  T(F): 99.7 (13 Jul 2022 04:00), Max: 100.4 (13 Jul 2022 00:00)  HR: 65 (13 Jul 2022 06:00) (59 - 75)  BP: 115/57 (13 Jul 2022 06:00) (97/53 - 135/62)  BP(mean): 82 (13 Jul 2022 06:00) (72 - 96)  ABP: --  ABP(mean): --  RR: 22 (13 Jul 2022 06:00) (17 - 26)  SpO2: 95% (13 Jul 2022 06:00) (90% - 100%)    O2 Parameters below as of 12 Jul 2022 20:00  Patient On (Oxygen Delivery Method): ventilator    O2 Concentration (%): 21        PHYSICAL EXAM:    General: Patient is intubated. No apparent distress.   HEENT: PERRLA, EOMi, no scleral icterus  CV: RRR, normal S1 and S2, no m/r/g  Lungs: normal respiratory effort. CTAB, no wheezes, rales, or rhonchi  Abd: soft, nontender, nondistended  Ext: no edema, 2+ peripheral pulses   Pysch: Intubated.  Neuro: grossly non-focal, moving all extremities spontaneously   Skin: no rashes or lesions     LABS:    CBC Full  -  ( 13 Jul 2022 00:18 )  WBC Count : 9.98 K/uL  RBC Count : 3.28 M/uL  Hemoglobin : 9.7 g/dL  Hematocrit : 30.9 %  Platelet Count - Automated : 242 K/uL  Mean Cell Volume : 94.2 fl  Mean Cell Hemoglobin : 29.6 pg  Mean Cell Hemoglobin Concentration : 31.4 gm/dL  Auto Neutrophil # : x  Auto Lymphocyte # : x  Auto Monocyte # : x  Auto Eosinophil # : x  Auto Basophil # : x  Auto Neutrophil % : x  Auto Lymphocyte % : x  Auto Monocyte % : x  Auto Eosinophil % : x  Auto Basophil % : x    07-13    144  |  101  |  32<H>  ----------------------------<  137<H>  3.4<L>   |  31  |  0.73    Ca    8.3<L>      13 Jul 2022 00:16  Phos  5.1     07-13  Mg     2.5     07-13    TPro  6.6  /  Alb  3.4  /  TBili  0.3  /  DBili  x   /  AST  12  /  ALT  <5<L>  /  AlkPhos  58  07-13      MRSA/MSSA PCR (07.11.22 @ 11:58)    MRSA PCR Result.: NotDetec: MRSA/MSSA PCR assay is a qualitative in vitro diagnostic test for the  direct detection and differentiation of methicillin-resistant  Staphylococcus aureus (MRSA) from Staphylococcus aureus (SA). The assay  detects DNA from nasal swabs in patients atrisk for nasal colonization.  It is not intended to diagnose MRSA or SA infections nor guide or monitor  treatment for MRSA/SA infections. A negative result does not preclude  nasal colonization. The Real-Time PCR assay is FDA-approved, and its  performance has been established by [facility], [place], [place].      RADIOLOGY & ADDITIONAL TESTS: Reviewed

## 2022-07-13 NOTE — CONSULT NOTE ADULT - ASSESSMENT
89 year old female with PMH of Parkinson's disease, HTN, chronic DVT on Eliquis, and hypothyroidism who was admitted for acute hypoxic respiratory failure most likely 2/2 PNA, currently intubated and treated with zosyn and vancomycin (holding for elevated trough level).

## 2022-07-13 NOTE — PROGRESS NOTE ADULT - SUBJECTIVE AND OBJECTIVE BOX
Patient is a 89y old  Female who presents with a chief complaint of Shortness of breath (13 Jul 2022 07:15)      INTERVAL HPI/OVERNIGHT EVENTS: No event. Patient is still intubated on CPAP as weaning trial. Patient tolerating weaning well. Possible extubation today. Patient is off of  antibiotics. Continue IV Lasix for possible CHF.     Pain Location & Control: OK    MEDICATIONS  (STANDING):  amLODIPine   Tablet 10 milliGRAM(s) Oral daily  apixaban 5 milliGRAM(s) Oral every 12 hours  ascorbic acid 500 milliGRAM(s) Oral daily  bethanechol 25 milliGRAM(s) Oral three times a day  carbidopa/levodopa  25/100 1 Tablet(s) Oral three times a day  chlorhexidine 0.12% Liquid 15 milliLiter(s) Oral Mucosa every 12 hours  chlorhexidine 4% Liquid 1 Application(s) Topical <User Schedule>  levothyroxine 100 MICROGram(s) Oral daily  losartan 50 milliGRAM(s) Oral daily  multivitamin 1 Tablet(s) Oral daily    MEDICATIONS  (PRN):  acetaminophen     Tablet .. 650 milliGRAM(s) Oral every 6 hours PRN Temp greater or equal to 38C (100.4F), Mild Pain (1 - 3)      Allergies    No Known Allergies    Intolerances        REVIEW OF SYSTEMS:  CONSTITUTIONAL: No fever, weight loss, or fatigue  EYES: No eye pain, visual disturbances, or discharge  ENMT:  No difficulty hearing, tinnitus, vertigo; No sinus or throat pain  NECK: No pain or stiffness  BREASTS: No pain, masses, or nipple discharge  RESPIRATORY: No cough, wheezing, chills or hemoptysis; No shortness of breath  CARDIOVASCULAR: No chest pain, palpitations, dizziness, or leg swelling  GASTROINTESTINAL: No abdominal or epigastric pain. No nausea, vomiting, or hematemesis; No diarrhea or constipation. No melena or hematochezia.  GENITOURINARY: No dysuria, frequency, hematuria, or incontinence  NEUROLOGICAL: No headaches, memory loss, loss of strength, numbness, or tremors  SKIN: No itching, burning, rashes, or lesions   LYMPH NODES: No enlarged glands  ENDOCRINE: No heat or cold intolerance; No hair loss; No polydipsia or polyuria  MUSCULOSKELETAL: No back pain  PSYCHIATRIC: No depression, anxiety, mood swings, or difficulty sleeping  HEME/LYMPH: No easy bruising, or bleeding gums  ALLERGY AND IMMUNOLOGIC: No hives or eczema    Vital Signs Last 24 Hrs  T(C): 37.5 (13 Jul 2022 07:00), Max: 38 (13 Jul 2022 00:00)  T(F): 99.5 (13 Jul 2022 07:00), Max: 100.4 (13 Jul 2022 00:00)  HR: 80 (13 Jul 2022 11:00) (59 - 80)  BP: 169/69 (13 Jul 2022 11:00) (97/53 - 169/69)  BP(mean): 99 (13 Jul 2022 11:00) (72 - 99)  RR: 44 (13 Jul 2022 11:00) (17 - 44)  SpO2: 93% (13 Jul 2022 11:00) (90% - 100%)    Parameters below as of 13 Jul 2022 07:00  Patient On (Oxygen Delivery Method): ventilator    O2 Concentration (%): 21    PHYSICAL EXAM:  GENERAL: NAD, well-groomed, well-developed  HEAD:  Atraumatic, Normocephalic  EYES: EOMI, PERRLA, conjunctiva and sclera clear  ENMT: No tonsillar erythema, exudates, or enlargement; Moist mucous membranes, Good dentition, No lesions  NECK: Supple, No JVD, Normal thyroid  NERVOUS SYSTEM:  Alert & Oriented X3, Good concentration; Motor Strength 5/5 B/L upper and lower extremities; DTRs 2+ intact and symmetric  CHEST/LUNG: Clear to auscultation bilaterally; No rales, rhonchi, wheezing, or rubs  HEART: Regular rate and rhythm; No murmurs, rubs, or gallops  ABDOMEN: Soft, Nontender, Nondistended; Bowel sounds present  EXTREMITIES:  2+ Peripheral Pulses, No clubbing or cyanosis  LYMPH: No lymphadenopathy noted  SKIN: No rashes or lesions      LABS:                        9.7    9.98  )-----------( 242      ( 13 Jul 2022 00:18 )             30.9     13 Jul 2022 00:16    144    |  101    |  32     ----------------------------<  137    3.4     |  31     |  0.73     Ca    8.3        13 Jul 2022 00:16  Phos  5.1       13 Jul 2022 00:16  Mg     2.5       13 Jul 2022 00:16    TPro  6.6    /  Alb  3.4    /  TBili  0.3    /  DBili  x      /  AST  12     /  ALT  <5     /  AlkPhos  58     13 Jul 2022 00:16    PT/INR - ( 13 Jul 2022 00:18 )   PT: 16.4 sec;   INR: 1.42 ratio         PTT - ( 13 Jul 2022 00:18 )  PTT:29.6 sec    CAPILLARY BLOOD GLUCOSE        CARDIAC MARKERS ( 12 Jul 2022 18:20 )  x     / x     / x     / x     / 3.4 ng/mL      Cultures  Culture Results:   No growth to date. (07-09-22 @ 02:09)  Culture Results:   >100,000 CFU/ml Escherichia coli (07-09-22 @ 01:37)  Culture Results:   No growth to date. (07-09-22 @ 00:30)      RADIOLOGY & ADDITIONAL TESTS:    Imaging Personally Reviewed:  [X ] YES  [ ] NO    Consultant(s) Notes Reviewed:  [X ] YES  [ ] NO    Care Discussed with Consultants/Other Providers [X ] YES  [ ] NO

## 2022-07-13 NOTE — PROGRESS NOTE ADULT - ASSESSMENT
89 year old female with PMH of Parkinson's disease, HTN, chronic DVT on Eliquis, and hypothyroidism who was admitted for acute hypoxic respiratory failure most likely 2/2 PNA, currently intubated and treated with zosyn and vancomycin (holding for elevated trough level).     Neuro:  - AOx1 on presentation, unknown baseline   - roby on precedex- d/maryam. Weaning off sedation today, will monitor ABG to see if ready for extubation   - unequal pupils on exam since admission  - CT head with no acute intracranial hemorrhage, hydrocephalus or extra-axial fluid collection. mild parenchymal volume loss and mild chronic microvascular ischemic changes    #Parkinson  -continue carbidopa/levodopa via OG tube     Cardiovascular:  #Heart-failure? / fluid overloaded  - BNP 3075 on 7/9  - echo with EF of 55% and mild aortic regurgitation,  paradoxical septal wall motion abnormality due to conduction abnormality  - 40mg IV lasix daily  - monitor I/Os    #Chronic DVT  - CT head negative  - apixaban 5mg q12hrs    #Hypertension  - c/w home amlodipine     Pulmonary:  #Acute Hypoxic Respiratory Failure   - Intubated, Vent (A/C) Volume: 400 Rate: 24 FiO2: 50 PEEP: 5  - alkalotic ABG, continue to monitor  - lowered rate and weaning off sedation, potential extubation if pt tolerates it    #Pneumonia  - c/w Zosyn. Holding Vancomycin (has decubitus ulcer, stage 4 on back) for elevated trough level  -repeat vanc trough at 5 pm  - Consider bronchoscopy for mucus plug   - sputum cultures NGTD    #COPD?  -per guardian, pt does not have a hx of COPD and if she did smoke, it was many years ago   - Uses fluticasone and Duoneb at nursing home, will continue    GI/Nutrition:  - No active issues   - tube feeds via OG tube     Renal:  - Creatinine 0.87  - Monitor urine output, foster  - 40 IV lasix   - f/u Cr, CMP daily    ID:  -Septic W/U for Aspiration PNA, UA +ve UTI, and Stage IV Decubital Wound, Tmax 101.9  -c/w zosyn  - hold Vancomycin due to decubitus ulcer -> elevated trough, repeat at 5 pm  - Nasal Swab for MRSA negative; staph aureus detected  - blood, urine, and sputum cultures NGTD  - if fever curve remains elevated would check RP/Kidney US  - temp of 100.4 overnight -> sent new blood cultures    Heme:   #anemia  - Hemoglobin 9.5 (10.6) -admitted with 11.4, unclear baseline. No active bleeding   - CBC daily     Endocrine:  #Hypothyroidism  - will continue home med, Synthroid     Skin  #decubitus ulcer, stage 4   - wound care consult  -Sacral wound- Aquacel dressing QD  -BLE elevation  -Abx per MICU  -Moisturize intact skin w/ SWEEN cream BID  -Nutrition Consult for optimization in pt w/ Increased nutritional needs  -Continue turning and positioning w/ offloading assistive devices as per protocol      Ethics:  - FULL CODE pending clarification with guardian. Spoke to guardian on 7/11 after not being able to reach her- she stated that she does not have authority to make decisions on code status unless she goes to court with a doctor. She will try to reach out to nursing home to see if they ever filled out a MOLST form for the patient.   Paperwork obtained and will give to palliative care nemo.    DVT: Eliquis                  89 year old female with PMH of Parkinson's disease, HTN, chronic DVT on Eliquis, and hypothyroidism who was admitted for acute hypoxic respiratory failure most likely 2/2 PNA, currently intubated and treated with zosyn and vancomycin (holding for elevated trough level).     Neuro:  - AOx1 on presentation, unknown baseline   - roby on precedex- d/maryam. Weaning off sedation today, will monitor ABG to see if ready for extubation   - unequal pupils on exam since admission  - CT head with no acute intracranial hemorrhage, hydrocephalus or extra-axial fluid collection. mild parenchymal volume loss and mild chronic microvascular ischemic changes    #Parkinson  -continue carbidopa/levodopa via OG tube     Cardiovascular:  #Heart-failure? / fluid overloaded  - BNP 3075 on 7/9  - echo with EF of 55% and mild aortic regurgitation,  paradoxical septal wall motion abnormality due to conduction abnormality  - last dose of 40mg IV lasix today/ will give home 40 mg lasix PO starting tomorrow  - monitor I/Os    #Chronic DVT  - CT head negative  - apixaban 5mg q12hrs    #Hypertension  - c/w home amlodipine     Pulmonary:  #Acute Hypoxic Respiratory Failure   - Intubated, Vent (A/C) Volume: 400 Rate: 24 FiO2: 21 PEEP: 5  - pt has been doing CPAP trials  - repeat ABG this morning, will evaluate for possible extubation today  - lowered rate and off sedation    #Pneumonia  - Finished ceftriaxone yesterday. Holding Vancomycin (has decubitus ulcer, stage 4 on back) for elevated trough level  - Consider bronchoscopy for mucus plug   - sputum cultures NGTD    #COPD?  -per guardian, pt does not have a hx of COPD and if she did smoke, it was many years ago   - Uses fluticasone and Duoneb at nursing home, will continue    GI/Nutrition:  - No active issues   - tube feeds via OG tube     Renal:  - Creatinine 0.87  - Monitor urine output, foster  - 40 IV lasix -today last day, will resume home 40 mg oral lasix tomorrow  - f/u Cr, CMP daily    ID:  -Septic W/U for Aspiration PNA, UA postitive UTI, and Stage IV Decubital Wound, Tmax 101.9  -pt febrile overnight, sent out blood cultures. If febrile again, will re-start zosyn.   - Vanc (7/9-7/11 ) Zosyn (7/9-7/11) Ceftriaxone (7/11-7/13)  - hold Vancomycin due to decubitus ulcer -> elevated trough, repeat at 5 pm  - Nasal Swab for MRSA negative; staph aureus detected  - blood, urine, and sputum cultures NGTD  - if fever curve remains elevated would check RP/Kidney US    Heme:   #anemia  - Hemoglobin 9.7 (9.5) -admitted with 11.4, unclear baseline. No active bleeding   - CBC daily     Endocrine:  #Hypothyroidism  - will continue home med, Synthroid     Skin  #decubitus ulcer, stage 4   - wound care consult  -Sacral wound- Aquacel dressing QD  -BLE elevation  -Abx per MICU  -Moisturize intact skin w/ SWEEN cream BID  -Nutrition Consult for optimization in pt w/ Increased nutritional needs  -Continue turning and positioning w/ offloading assistive devices as per protocol      Ethics:  - FULL CODE pending clarification with guardian. Spoke to guardian on 7/11 after not being able to reach her- she stated that she does not have authority to make decisions on code status unless she goes to court with a doctor. She will try to reach out to nursing home to see if they ever filled out a MOLST form for the patient.   Guardianship paperwork obtained and in chart- palliative care team is aware.    DVT: Eliquis

## 2022-07-13 NOTE — CONSULT NOTE ADULT - SUBJECTIVE AND OBJECTIVE BOX
HPI:  89 year old female with PMH of Parkinson's disease, HTN, chronic DVT on Eliquis, and hypothyroidism who was brought in by EMS for shortness of breath. Limited history, obtained through paperwork from nursing home. Patient was suspected to have bilateral pneumonia based on chest X-ray at nursing home yesterday 7/8. On initial presentation patient was afebrile, had non labored breathing on 6L nasal canula, and was AOx1. In the ED, patient was found to be hypercapnic with a PCO2 of 104 and pH of 7.19 off a VBG. Patient was placed on avaps; however she continued to be hypercapnic and acidotic with repeat VBG showing PCO2 of 117 and pH of 7.09. At this time, patient was intubated.  (09 Jul 2022 05:11)    PERTINENT PM/SXH:   HTN (hypertension)    Parkinsons    HLD (hyperlipidemia)    DVT, lower extremity    Hypothyroid      No significant past surgical history      FAMILY HISTORY:    Family Hx substance abuse [ ]yes [ ]no  ITEMS NOT CHECKED ARE NOT PRESENT    SOCIAL HISTORY:   Significant other/partner[ ]  Children[ ]  Church/Spirituality:  Substance hx:  [ ]   Tobacco hx:  [ ]   Alcohol hx: [ ]   Home Opioid hx:  [ ] I-Stop Reference No:  Living Situation: [ ]Home  [X]Long term care  [ ]Rehab [ ]Other    ADVANCE DIRECTIVES:    DNR/MOLST  [ ]  Living Will  [ ]   DECISION MAKER(s):  [ ] Health Care Proxy(s)  [ ] Surrogate(s)  [X] Guardian           Name(s): Evaristo Reddy Phone Number(s): [phone]    BASELINE (I)ADL(s) (prior to admission):  [place]: [ ]Total  [ ] Moderate [X]Dependent    Allergies    No Known Allergies    Intolerances    MEDICATIONS  (STANDING):  ALBUTerol    90 MICROgram(s) HFA Inhaler      albuterol/ipratropium for Nebulization 3 milliLiter(s) Nebulizer every 6 hours  amLODIPine   Tablet 10 milliGRAM(s) Oral daily  apixaban 5 milliGRAM(s) Oral every 12 hours  ascorbic acid 500 milliGRAM(s) Oral daily  bethanechol 25 milliGRAM(s) Oral three times a day  carbidopa/levodopa  25/100 1 Tablet(s) Oral three times a day  chlorhexidine 0.12% Liquid 15 milliLiter(s) Oral Mucosa every 12 hours  chlorhexidine 4% Liquid 1 Application(s) Topical <User Schedule>  levothyroxine 100 MICROGram(s) Oral daily  losartan 50 milliGRAM(s) Oral daily  multivitamin 1 Tablet(s) Oral daily  piperacillin/tazobactam IVPB.. 3.375 Gram(s) IV Intermittent every 8 hours    MEDICATIONS  (PRN):  acetaminophen     Tablet .. 650 milliGRAM(s) Oral every 6 hours PRN Temp greater or equal to 38C (100.4F), Mild Pain (1 - 3)    PRESENT SYMPTOMS: [ ]Unable to self-report  [ ] CPOT [ ] PAINADs [ ] RDOS  Source if other than patient:  [ ]Family   [ ]Team     Pain: [ ]yes [X]no  QOL impact -   Location -                    Aggravating factors -  Quality -  Radiation -  Timing-  Severity (0-10 scale):  Minimal acceptable level (0-10 scale):     CPOT:    https://www.[facility].org/getattachment/[email or web address]/Critical-Care-Pain-Observation-Tool-(CPOT)    PAIN AD Score:   http://geriatrictoolkit.[place].[facility]/cog/painad.pdf (press ctrl +  left click to view)    Dyspnea:                           [ ]Mild [ ]Moderate [ ]Severe    RDOS:  https://homecareinformation.net/handouts/hen/Respiratory_Distress_Observation_Scale.pdf (Ctrl +  left click to view)     Anxiety:                             [ ]Mild [ ]Moderate [ ]Severe  Fatigue:                             [ ]Mild [ ]Moderate [ ]Severe  Nausea:                             [ ]Mild [ ]Moderate [ ]Severe  Loss of appetite:              [ ]Mild [ ]Moderate [ ]Severe  Constipation:                    [ ]Mild [ ]Moderate [ ]Severe    Other Symptoms:  [X]All other review of systems negative     Palliative Performance Status Version 2:         10%    http://npcrc.org/files/news/palliative_performance_scale_ppsv2.pdf    PHYSICAL EXAM:  Vital Signs Last 24 Hrs  T(C): 37.3 (13 Jul 2022 16:00), Max: 38 (13 Jul 2022 00:00)  T(F): 99.1 (13 Jul 2022 16:00), Max: 100.4 (13 Jul 2022 00:00)  HR: 67 (13 Jul 2022 16:00) (59 - 80)  BP: 126/62 (13 Jul 2022 16:00) (99/69 - 169/69)  BP(mean): 89 (13 Jul 2022 16:00) (74 - 99)  RR: 26 (13 Jul 2022 14:00) (17 - 44)  SpO2: 98% (13 Jul 2022 16:00) (90% - 100%)    Parameters below as of 13 Jul 2022 07:00  Patient On (Oxygen Delivery Method): ventilator    O2 Concentration (%): 21 I&O's Summary    12 Jul 2022 07:01  -  13 Jul 2022 07:00  --------------------------------------------------------  IN: 1134.4 mL / OUT: 550 mL / NET: 584.4 mL    13 Jul 2022 07:01  -  13 Jul 2022 17:01  --------------------------------------------------------  IN: 600 mL / OUT: 200 mL / NET: 400 mL      GENERAL: [ ]Cachexia    [X]Alert  [ ]Oriented x   [ ]Lethargic  [ ]Unarousable  [ ]Verbal  [X]Non-Verbal  Behavioral:   [ ] Anxiety  [ ] Delirium [ ] Agitation [ ] Other  HEENT:  [ ]Normal   [ ]Dry mouth   [X]ET Tube  [ ]Oral lesions  PULMONARY:  [X]Clear [ ]Tachypnea  [ ]Audible excessive secretions   [ ]Rhonchi        [ ]Right [ ]Left [ ]Bilateral  [ ]Crackles        [ ]Right [ ]Left [ ]Bilateral  [ ]Wheezing     [ ]Right [ ]Left [ ]Bilateral  [ ]Diminished breath sounds [ ]right [ ]left [ ]bilateral  CARDIOVASCULAR:    [X]Regular [ ]Irregular [ ]Tachy  [ ]Vipul [ ]Murmur [ ]Other  GASTROINTESTINAL:  [X]Soft  [ ]Distended   [ ]+BS  [X]Non tender [ ]Tender  [ ]Other [ ]PEG [ ]OGT/ NGT  Last BM:  GENITOURINARY:  [X]Normal [ ] Incontinent   [ ]Oliguria/Anuria   [ ]Ball  MUSCULOSKELETAL:   [ ]Normal   [ ]Weakness  [X]Bed/Wheelchair bound [ ]Edema  NEUROLOGIC:   [ ]No focal deficits  [X]Cognitive impairment  [ ]Dysphagia [ ]Dysarthria [ ]Paresis [ ]Other   SKIN:   [ ]Normal  [ ]Rash  [ ]Other  [X]Pressure ulcer(s)       Present on admission [X]y [ ]n    CRITICAL CARE:  [ ] Shock Present  [ ]Septic [ ]Cardiogenic [ ]Neurologic [ ]Hypovolemic  [ ]  Vasopressors [ ]  Inotropes   [X]Respiratory failure present [X]Mechanical ventilation [ ]Non-invasive ventilatory support [ ]High flow  Mode: CPAP with PS, FiO2: 21, PEEP: 5, PS: 8, MAP: 8, PIP: 14  [X]Acute  [ ]Chronic [ ]Hypoxic  [ ]Hypercarbic [ ]Other  [ ]Other organ failure     LABS:                        9.7    9.98  )-----------( 242      ( 13 Jul 2022 00:18 )             30.9   07-13    144  |  101  |  32<H>  ----------------------------<  137<H>  3.4<L>   |  31  |  0.73    Ca    8.3<L>      13 Jul 2022 00:16  Phos  5.1     07-13  Mg     2.5     07-13    TPro  6.6  /  Alb  3.4  /  TBili  0.3  /  DBili  x   /  AST  12  /  ALT  <5<L>  /  AlkPhos  58  07-13    PT/INR - ( 13 Jul 2022 00:18 )   PT: 16.4 sec;   INR: 1.42 ratio    PTT - ( 13 Jul 2022 00:18 )  PTT:29.6 sec      RADIOLOGY & ADDITIONAL STUDIES:  < from: Xray Chest 1 View- PORTABLE-Urgent (Xray Chest 1 View- PORTABLE-Urgent .) (07.09.22 @ 18:08) >  IMPRESSION:  Enteric tube with tip terminating in the fundus of the stomach.  Limited exam of the lungs. Small left effusion.    < end of copied text >      PROTEIN CALORIE MALNUTRITION PRESENT: [ ]mild [ ]moderate [ ]severe [ ]underweight [ ]morbid obesity  https://www.andeal.org/vault/2440/web/files/ONC/Table_Clinical%20Characteristics%20to%20Document%20Malnutrition-White%20JV%20et%20al%202012.pdf    Height (cm): 157.5 (07-09-22 @ 00:08)  Weight (kg): 60.1 (07-09-22 @ 06:25)  BMI (kg/m2): 24.2 (07-09-22 @ 06:25)    [X]PPSV2 < or = to 30% [ ]significant weight loss  [ ]poor nutritional intake  [X]anasarca[ ]Artificial Nutrition      REFERRALS:   [ ]Chaplaincy  [ ]Hospice  [ ]Child Life  [ ]Social Work  [X]Case management [ ]Holistic Therapy     Goals of Care Document:  HPI:  89 year old female with PMH of Parkinson's disease, HTN, chronic DVT on Eliquis, and hypothyroidism who was brought in by EMS for shortness of breath. Limited history, obtained through paperwork from nursing home. Patient was suspected to have bilateral pneumonia based on chest X-ray at nursing home yesterday 7/8. On initial presentation patient was afebrile, had non labored breathing on 6L nasal canula, and was AOx1. In the ED, patient was found to be hypercapnic with a PCO2 of 104 and pH of 7.19 off a VBG. Patient was placed on avaps; however she continued to be hypercapnic and acidotic with repeat VBG showing PCO2 of 117 and pH of 7.09. At this time, patient was intubated.  (09 Jul 2022 05:11)    PERTINENT PM/SXH:   HTN (hypertension)    Parkinsons    HLD (hyperlipidemia)    DVT, lower extremity    Hypothyroid      No significant past surgical history      FAMILY HISTORY:    Family Hx substance abuse [ ]yes [ ]no  ITEMS NOT CHECKED ARE NOT PRESENT    SOCIAL HISTORY:   Significant other/partner[ ]  Children[ ]  Zoroastrian/Spirituality:  Substance hx:  [ ]   Tobacco hx:  [ ]   Alcohol hx: [ ]   Home Opioid hx:  [ ] I-Stop Reference No:  Living Situation: [ ]Home  [X]Long term care  [ ]Rehab [ ]Other    ADVANCE DIRECTIVES:    DNR/MOLST  [ ]  Living Will  [ ]   DECISION MAKER(s):  [ ] Health Care Proxy(s)  [ ] Surrogate(s)  [X] Guardian           Name(s): Evaristo Reddy Phone Number(s): [phone]    BASELINE (I)ADL(s) (prior to admission):  [place]: [ ]Total  [ ] Moderate [X]Dependent    Allergies    No Known Allergies    Intolerances    MEDICATIONS  (STANDING):  ALBUTerol    90 MICROgram(s) HFA Inhaler      albuterol/ipratropium for Nebulization 3 milliLiter(s) Nebulizer every 6 hours  amLODIPine   Tablet 10 milliGRAM(s) Oral daily  apixaban 5 milliGRAM(s) Oral every 12 hours  ascorbic acid 500 milliGRAM(s) Oral daily  bethanechol 25 milliGRAM(s) Oral three times a day  carbidopa/levodopa  25/100 1 Tablet(s) Oral three times a day  chlorhexidine 0.12% Liquid 15 milliLiter(s) Oral Mucosa every 12 hours  chlorhexidine 4% Liquid 1 Application(s) Topical <User Schedule>  levothyroxine 100 MICROGram(s) Oral daily  losartan 50 milliGRAM(s) Oral daily  multivitamin 1 Tablet(s) Oral daily  piperacillin/tazobactam IVPB.. 3.375 Gram(s) IV Intermittent every 8 hours    MEDICATIONS  (PRN):  acetaminophen     Tablet .. 650 milliGRAM(s) Oral every 6 hours PRN Temp greater or equal to 38C (100.4F), Mild Pain (1 - 3)    PRESENT SYMPTOMS: [ ]Unable to self-report  [ ] CPOT [ ] PAINADs [ ] RDOS  Source if other than patient:  [ ]Family   [ ]Team     Pain: [ ]yes [X]no  QOL impact -   Location -                    Aggravating factors -  Quality -  Radiation -  Timing-  Severity (0-10 scale):  Minimal acceptable level (0-10 scale):     CPOT:    https://www.[facility].org/getattachment/[email or web address]/Critical-Care-Pain-Observation-Tool-(CPOT)    PAIN AD Score:   http://geriatrictoolkit.[place].[facility]/cog/painad.pdf (press ctrl +  left click to view)    Dyspnea:                           [ ]Mild [ ]Moderate [ ]Severe    RDOS:  https://homecareinformation.net/handouts/hen/Respiratory_Distress_Observation_Scale.pdf (Ctrl +  left click to view)     Anxiety:                             [ ]Mild [ ]Moderate [ ]Severe  Fatigue:                             [ ]Mild [ ]Moderate [ ]Severe  Nausea:                             [ ]Mild [ ]Moderate [ ]Severe  Loss of appetite:              [ ]Mild [ ]Moderate [ ]Severe  Constipation:                    [ ]Mild [ ]Moderate [ ]Severe    Other Symptoms:  [X]All other review of systems negative     Palliative Performance Status Version 2:         10%    http://npcrc.org/files/news/palliative_performance_scale_ppsv2.pdf    PHYSICAL EXAM:  Vital Signs Last 24 Hrs  T(C): 37.3 (13 Jul 2022 16:00), Max: 38 (13 Jul 2022 00:00)  T(F): 99.1 (13 Jul 2022 16:00), Max: 100.4 (13 Jul 2022 00:00)  HR: 67 (13 Jul 2022 16:00) (59 - 80)  BP: 126/62 (13 Jul 2022 16:00) (99/69 - 169/69)  BP(mean): 89 (13 Jul 2022 16:00) (74 - 99)  RR: 26 (13 Jul 2022 14:00) (17 - 44)  SpO2: 98% (13 Jul 2022 16:00) (90% - 100%)    Parameters below as of 13 Jul 2022 07:00  Patient On (Oxygen Delivery Method): ventilator    O2 Concentration (%): 21 I&O's Summary    12 Jul 2022 07:01  -  13 Jul 2022 07:00  --------------------------------------------------------  IN: 1134.4 mL / OUT: 550 mL / NET: 584.4 mL    13 Jul 2022 07:01  -  13 Jul 2022 17:01  --------------------------------------------------------  IN: 600 mL / OUT: 200 mL / NET: 400 mL      GENERAL: [ ]Cachexia    [X]Alert  [ ]Oriented x   [ ]Lethargic  [ ]Unarousable  [ ]Verbal  [X]Non-Verbal  Behavioral:   [ ] Anxiety  [ ] Delirium [ ] Agitation [ ] Other  HEENT:  [ ]Normal   [ ]Dry mouth   [X]ET Tube  [ ]Oral lesions  PULMONARY:  [X]Clear [ ]Tachypnea  [ ]Audible excessive secretions   [ ]Rhonchi        [ ]Right [ ]Left [ ]Bilateral  [ ]Crackles        [ ]Right [ ]Left [ ]Bilateral  [ ]Wheezing     [ ]Right [ ]Left [ ]Bilateral  [ ]Diminished breath sounds [ ]right [ ]left [ ]bilateral  CARDIOVASCULAR:    [X]Regular [ ]Irregular [ ]Tachy  [ ]Vipul [ ]Murmur [ ]Other  GASTROINTESTINAL:  [X]Soft  [ ]Distended   [ ]+BS  [X]Non tender [ ]Tender  [ ]Other [ ]PEG [ ]OGT/ NGT  Last BM:  GENITOURINARY:  [X]Normal [ ] Incontinent   [ ]Oliguria/Anuria   [ ]Ball  MUSCULOSKELETAL:   [ ]Normal   [ ]Weakness  [X]Bed/Wheelchair bound [ ]Edema  NEUROLOGIC:   [ ]No focal deficits  [X]Cognitive impairment  [ ]Dysphagia [ ]Dysarthria [ ]Paresis [ ]Other   SKIN:   [ ]Normal  [ ]Rash  [ ]Other  [X]Pressure ulcer(s)       Present on admission [X]y [ ]n    CRITICAL CARE:  [ ] Shock Present  [ ]Septic [ ]Cardiogenic [ ]Neurologic [ ]Hypovolemic  [ ]  Vasopressors [ ]  Inotropes   [X]Respiratory failure present [X]Mechanical ventilation [ ]Non-invasive ventilatory support [ ]High flow  Mode: CPAP with PS, FiO2: 21, PEEP: 5, PS: 8, MAP: 8, PIP: 14  [X]Acute  [ ]Chronic [ ]Hypoxic  [ ]Hypercarbic [ ]Other  [ ]Other organ failure     LABS:                        9.7    9.98  )-----------( 242      ( 13 Jul 2022 00:18 )             30.9   07-13    144  |  101  |  32<H>  ----------------------------<  137<H>  3.4<L>   |  31  |  0.73    Ca    8.3<L>      13 Jul 2022 00:16  Phos  5.1     07-13  Mg     2.5     07-13    TPro  6.6  /  Alb  3.4  /  TBili  0.3  /  DBili  x   /  AST  12  /  ALT  <5<L>  /  AlkPhos  58  07-13    PT/INR - ( 13 Jul 2022 00:18 )   PT: 16.4 sec;   INR: 1.42 ratio    PTT - ( 13 Jul 2022 00:18 )  PTT:29.6 sec      RADIOLOGY & ADDITIONAL STUDIES:  < from: Xray Chest 1 View- PORTABLE-Urgent (Xray Chest 1 View- PORTABLE-Urgent .) (07.09.22 @ 18:08) >  IMPRESSION:  Enteric tube with tip terminating in the fundus of the stomach.  Limited exam of the lungs. Small left effusion.    < end of copied text >      PROTEIN CALORIE MALNUTRITION PRESENT: [ ]mild [ ]moderate [ ]severe [ ]underweight [ ]morbid obesity  https://www.andeal.org/vault/2440/web/files/ONC/Table_Clinical%20Characteristics%20to%20Document%20Malnutrition-White%20JV%20et%20al%202012.pdf    Height (cm): 157.5 (07-09-22 @ 00:08)  Weight (kg): 60.1 (07-09-22 @ 06:25)  BMI (kg/m2): 24.2 (07-09-22 @ 06:25)    [X]PPSV2 < or = to 30% [ ]significant weight loss  [ ]poor nutritional intake  [X]anasarca[ ]Artificial Nutrition      REFERRALS:   [ ]Chaplaincy  [ ]Hospice  [ ]Child Life  [x ]Social Work  []Case management [ ]Holistic Therapy     Goals of Care Document:

## 2022-07-13 NOTE — CONSULT NOTE ADULT - PROBLEM SELECTOR RECOMMENDATION 3
Following for GOC. Patient with a legal guardian. See [facility] as above.  Legal Guardian: Evaristo Reddy. Paperwork in chart.  All medical decisions must go through her but she has indicated that she cannot make any decisions regarding resuscitation or intubation and would need to petition the court but is unavailable to do so for the next 3 weeks.  - May require ethics consult given that guardian is unavailable to partake in full decision making for patient for 3 week period and if situation comes up that medical decisions need to be made, medical team will need to know how to proceed in her absence.

## 2022-07-13 NOTE — CONSULT NOTE ADULT - CONVERSATION DETAILS
Spoke on the phone with patient's court appointed legal guardian Evaristo Reddy who has been her guardian for 20 years. She indicated that she does not have the authority to make any end of life decisions or any decisions about resuscitation or intubation. Her interpretation of the guardianship is that she is the representative of the court and must petition the court for all medical decisions. She also explained that she will be on vacation out of town for the next 2 weeks and will not be able to petition any decisions to the court for the next 3 weeks. She explained that the court has never allowed her to have someone  for her in case she is unavailable and she suggested that if we need a decision regarding resuscitation or intubation, we could 2 PC or petition the court ourselves via our own legal department because she is unavailable. Spoke on the phone with patient's court appointed legal guardian Evaristo Reddy who has been her guardian for 20 years. She indicated that she does not have the authority to make any end of life decisions or any decisions about resuscitation or intubation. Her interpretation of the guardianship is that she is the representative of the court and must petition the court for all medical decisions. She also explained that she will be on vacation out of town for the next 2 weeks and will not be able to petition any decisions to the court for the next 3 weeks. She explained that the court has never allowed her to have someone  for her in case she is unavailable and she suggested that if we needed an emergent decision regarding resuscitation or intubation, we could 2 PC or petition the court ourselves via our own legal department because she is unavailable.

## 2022-07-14 LAB
ALBUMIN SERPL ELPH-MCNC: 3.7 G/DL — SIGNIFICANT CHANGE UP (ref 3.3–5)
ALP SERPL-CCNC: 61 U/L — SIGNIFICANT CHANGE UP (ref 40–120)
ALT FLD-CCNC: <5 U/L — LOW (ref 10–45)
ANION GAP SERPL CALC-SCNC: 14 MMOL/L — SIGNIFICANT CHANGE UP (ref 5–17)
APTT BLD: 31 SEC — SIGNIFICANT CHANGE UP (ref 27.5–35.5)
AST SERPL-CCNC: 12 U/L — SIGNIFICANT CHANGE UP (ref 10–40)
BASE EXCESS BLDV CALC-SCNC: 10.1 MMOL/L — HIGH (ref -2–2)
BILIRUB SERPL-MCNC: 0.4 MG/DL — SIGNIFICANT CHANGE UP (ref 0.2–1.2)
BUN SERPL-MCNC: 30 MG/DL — HIGH (ref 7–23)
CA-I SERPL-SCNC: 1.13 MMOL/L — LOW (ref 1.15–1.33)
CALCIUM SERPL-MCNC: 8.6 MG/DL — SIGNIFICANT CHANGE UP (ref 8.4–10.5)
CHLORIDE BLDV-SCNC: 105 MMOL/L — SIGNIFICANT CHANGE UP (ref 96–108)
CHLORIDE SERPL-SCNC: 105 MMOL/L — SIGNIFICANT CHANGE UP (ref 96–108)
CO2 BLDV-SCNC: 37 MMOL/L — HIGH (ref 22–26)
CO2 SERPL-SCNC: 31 MMOL/L — SIGNIFICANT CHANGE UP (ref 22–31)
CREAT SERPL-MCNC: 0.8 MG/DL — SIGNIFICANT CHANGE UP (ref 0.5–1.3)
CULTURE RESULTS: SIGNIFICANT CHANGE UP
CULTURE RESULTS: SIGNIFICANT CHANGE UP
EGFR: 70 ML/MIN/1.73M2 — SIGNIFICANT CHANGE UP
GAS PNL BLDV: 143 MMOL/L — SIGNIFICANT CHANGE UP (ref 136–145)
GAS PNL BLDV: SIGNIFICANT CHANGE UP
GLUCOSE BLDV-MCNC: 119 MG/DL — HIGH (ref 70–99)
GLUCOSE SERPL-MCNC: 124 MG/DL — HIGH (ref 70–99)
HCO3 BLDV-SCNC: 35 MMOL/L — HIGH (ref 22–29)
HCT VFR BLD CALC: 31.2 % — LOW (ref 34.5–45)
HCT VFR BLDA CALC: 30 % — LOW (ref 34.5–46.5)
HCV RNA SPEC NAA+PROBE-LOG IU: SIGNIFICANT CHANGE UP
HCV RNA SPEC NAA+PROBE-LOG IU: SIGNIFICANT CHANGE UP LOGIU/ML
HGB BLD CALC-MCNC: 10 G/DL — LOW (ref 11.7–16.1)
HGB BLD-MCNC: 9.6 G/DL — LOW (ref 11.5–15.5)
INR BLD: 1.41 RATIO — HIGH (ref 0.88–1.16)
LACTATE BLDV-MCNC: 0.7 MMOL/L — SIGNIFICANT CHANGE UP (ref 0.7–2)
MAGNESIUM SERPL-MCNC: 2.7 MG/DL — HIGH (ref 1.6–2.6)
MCHC RBC-ENTMCNC: 29.5 PG — SIGNIFICANT CHANGE UP (ref 27–34)
MCHC RBC-ENTMCNC: 30.8 GM/DL — LOW (ref 32–36)
MCV RBC AUTO: 96 FL — SIGNIFICANT CHANGE UP (ref 80–100)
NRBC # BLD: 0 /100 WBCS — SIGNIFICANT CHANGE UP (ref 0–0)
PCO2 BLDV: 51 MMHG — HIGH (ref 39–42)
PH BLDV: 7.45 — HIGH (ref 7.32–7.43)
PHOSPHATE SERPL-MCNC: 4 MG/DL — SIGNIFICANT CHANGE UP (ref 2.5–4.5)
PLATELET # BLD AUTO: 243 K/UL — SIGNIFICANT CHANGE UP (ref 150–400)
PO2 BLDV: 39 MMHG — SIGNIFICANT CHANGE UP (ref 25–45)
POTASSIUM BLDV-SCNC: 3.6 MMOL/L — SIGNIFICANT CHANGE UP (ref 3.5–5.1)
POTASSIUM SERPL-MCNC: 3.6 MMOL/L — SIGNIFICANT CHANGE UP (ref 3.5–5.3)
POTASSIUM SERPL-SCNC: 3.6 MMOL/L — SIGNIFICANT CHANGE UP (ref 3.5–5.3)
PROT SERPL-MCNC: 6.9 G/DL — SIGNIFICANT CHANGE UP (ref 6–8.3)
PROTHROM AB SERPL-ACNC: 16.4 SEC — HIGH (ref 10.5–13.4)
RBC # BLD: 3.25 M/UL — LOW (ref 3.8–5.2)
RBC # FLD: 14.9 % — HIGH (ref 10.3–14.5)
SAO2 % BLDV: 70.2 % — SIGNIFICANT CHANGE UP (ref 67–88)
SODIUM SERPL-SCNC: 150 MMOL/L — HIGH (ref 135–145)
SPECIMEN SOURCE: SIGNIFICANT CHANGE UP
SPECIMEN SOURCE: SIGNIFICANT CHANGE UP
WBC # BLD: 8 K/UL — SIGNIFICANT CHANGE UP (ref 3.8–10.5)
WBC # FLD AUTO: 8 K/UL — SIGNIFICANT CHANGE UP (ref 3.8–10.5)

## 2022-07-14 PROCEDURE — 99291 CRITICAL CARE FIRST HOUR: CPT

## 2022-07-14 RX ADMIN — CHLORHEXIDINE GLUCONATE 15 MILLILITER(S): 213 SOLUTION TOPICAL at 17:28

## 2022-07-14 RX ADMIN — CARBIDOPA AND LEVODOPA 1 TABLET(S): 25; 100 TABLET ORAL at 05:26

## 2022-07-14 RX ADMIN — Medication 3 MILLILITER(S): at 11:43

## 2022-07-14 RX ADMIN — LOSARTAN POTASSIUM 50 MILLIGRAM(S): 100 TABLET, FILM COATED ORAL at 05:51

## 2022-07-14 RX ADMIN — Medication 1 TABLET(S): at 12:17

## 2022-07-14 RX ADMIN — Medication 100 MICROGRAM(S): at 05:26

## 2022-07-14 RX ADMIN — CARBIDOPA AND LEVODOPA 1 TABLET(S): 25; 100 TABLET ORAL at 22:03

## 2022-07-14 RX ADMIN — Medication 25 MILLIGRAM(S): at 22:03

## 2022-07-14 RX ADMIN — APIXABAN 5 MILLIGRAM(S): 2.5 TABLET, FILM COATED ORAL at 18:00

## 2022-07-14 RX ADMIN — APIXABAN 5 MILLIGRAM(S): 2.5 TABLET, FILM COATED ORAL at 05:26

## 2022-07-14 RX ADMIN — Medication 500 MILLIGRAM(S): at 11:52

## 2022-07-14 RX ADMIN — PIPERACILLIN AND TAZOBACTAM 25 GRAM(S): 4; .5 INJECTION, POWDER, LYOPHILIZED, FOR SOLUTION INTRAVENOUS at 17:29

## 2022-07-14 RX ADMIN — APIXABAN 5 MILLIGRAM(S): 2.5 TABLET, FILM COATED ORAL at 17:33

## 2022-07-14 RX ADMIN — Medication 25 MILLIGRAM(S): at 13:23

## 2022-07-14 RX ADMIN — Medication 25 MILLIGRAM(S): at 05:26

## 2022-07-14 RX ADMIN — Medication 40 MILLIGRAM(S): at 05:27

## 2022-07-14 RX ADMIN — CARBIDOPA AND LEVODOPA 1 TABLET(S): 25; 100 TABLET ORAL at 13:23

## 2022-07-14 RX ADMIN — CHLORHEXIDINE GLUCONATE 1 APPLICATION(S): 213 SOLUTION TOPICAL at 05:25

## 2022-07-14 RX ADMIN — AMLODIPINE BESYLATE 10 MILLIGRAM(S): 2.5 TABLET ORAL at 05:27

## 2022-07-14 RX ADMIN — Medication 3 MILLILITER(S): at 17:22

## 2022-07-14 RX ADMIN — Medication 3 MILLILITER(S): at 06:18

## 2022-07-14 RX ADMIN — PIPERACILLIN AND TAZOBACTAM 25 GRAM(S): 4; .5 INJECTION, POWDER, LYOPHILIZED, FOR SOLUTION INTRAVENOUS at 09:43

## 2022-07-14 RX ADMIN — CHLORHEXIDINE GLUCONATE 15 MILLILITER(S): 213 SOLUTION TOPICAL at 05:25

## 2022-07-14 RX ADMIN — Medication 3 MILLILITER(S): at 23:28

## 2022-07-14 RX ADMIN — PIPERACILLIN AND TAZOBACTAM 25 GRAM(S): 4; .5 INJECTION, POWDER, LYOPHILIZED, FOR SOLUTION INTRAVENOUS at 01:50

## 2022-07-14 NOTE — PROGRESS NOTE ADULT - SUBJECTIVE AND OBJECTIVE BOX
Subjective / Overnight Events: Pt back on full pressor support after failed SBT.    Additional ROS (if any):    MEDICATIONS  (STANDING):  amLODIPine   Tablet 10 milliGRAM(s) Oral daily  apixaban 5 milliGRAM(s) Oral every 12 hours  bethanechol 25 milliGRAM(s) Oral three times a day  carbidopa/levodopa  25/100 1 Tablet(s) Oral three times a day  chlorhexidine 0.12% Liquid 15 milliLiter(s) Oral Mucosa every 12 hours  chlorhexidine 4% Liquid 1 Application(s) Topical <User Schedule>  collagenase Ointment 1 Application(s) Topical daily  dexMEDEtomidine Infusion 0.2 MICROgram(s)/kG/Hr (3.06 mL/Hr) IV Continuous <Continuous>  fentaNYL   Infusion... 1 MICROgram(s)/kG/Hr (3.06 mL/Hr) IV Continuous <Continuous>  furosemide    Tablet 40 milliGRAM(s) Oral daily  levothyroxine 100 MICROGram(s) Oral daily  losartan 50 milliGRAM(s) Oral daily  metoprolol succinate ER 50 milliGRAM(s) Oral daily  mometasone 220 MICROgram(s) Inhaler 1 Puff(s) Inhalation daily  piperacillin/tazobactam IVPB. 3.375 Gram(s) IV Intermittent once  piperacillin/tazobactam IVPB.. 3.375 Gram(s) IV Intermittent every 8 hours  vancomycin  IVPB 1000 milliGRAM(s) IV Intermittent every 12 hours    MEDICATIONS  (PRN):    POCT  Blood Glucose (07.10.22 @ 17:13)    POCT Blood Glucose.: 116 mg/dL    I&O's Summary    13 Jul 2022 07:01  -  14 Jul 2022 07:00  --------------------------------------------------------  IN: 1670 mL / OUT: 350 mL / NET: 1320 mL    ICU Vital Signs Last 24 Hrs  T(C): 37.9 (14 Jul 2022 00:00), Max: 37.9 (14 Jul 2022 00:00)  T(F): 100.2 (14 Jul 2022 00:00), Max: 100.2 (14 Jul 2022 00:00)  HR: 70 (14 Jul 2022 06:42) (63 - 112)  BP: 122/57 (14 Jul 2022 05:00) (118/60 - 179/92)  BP(mean): 82 (14 Jul 2022 05:00) (82 - 121)  ABP: --  ABP(mean): --  RR: 23 (14 Jul 2022 05:00) (18 - 44)  SpO2: 93% (14 Jul 2022 06:42) (92% - 100%)    O2 Parameters below as of 14 Jul 2022 06:42  Patient On (Oxygen Delivery Method): ventilator      PHYSICAL EXAM:    General: Patient is intubated. No apparent distress.   HEENT: PERRLA, EOMi, no scleral icterus  CV: RRR, normal S1 and S2, no m/r/g  Lungs: normal respiratory effort. CTAB, no wheezes, rales, or rhonchi  Abd: soft, nontender, nondistended  Ext: no edema, 2+ peripheral pulses   Neuro: grossly non-focal, moving all extremities spontaneously   Skin: no rashes or lesions     LABS:    CBC Full  -  ( 14 Jul 2022 00:50 )  WBC Count : 8.00 K/uL  RBC Count : 3.25 M/uL  Hemoglobin : 9.6 g/dL  Hematocrit : 31.2 %  Platelet Count - Automated : 243 K/uL  Mean Cell Volume : 96.0 fl  Mean Cell Hemoglobin : 29.5 pg  Mean Cell Hemoglobin Concentration : 30.8 gm/dL  Auto Neutrophil # : x  Auto Lymphocyte # : x  Auto Monocyte # : x  Auto Eosinophil # : x  Auto Basophil # : x  Auto Neutrophil % : x  Auto Lymphocyte % : x  Auto Monocyte % : x  Auto Eosinophil % : x  Auto Basophil % : x    07-14    150<H>  |  105  |  30<H>  ----------------------------<  124<H>  3.6   |  31  |  0.80    Ca    8.6      14 Jul 2022 00:50  Phos  4.0     07-14  Mg     2.7     07-14    TPro  6.9  /  Alb  3.7  /  TBili  0.4  /  DBili  x   /  AST  12  /  ALT  <5<L>  /  AlkPhos  61  07-14      MRSA/MSSA PCR (07.11.22 @ 11:58)    MRSA PCR Result.: NotDetec: MRSA/MSSA PCR assay is a qualitative in vitro diagnostic test for the  direct detection and differentiation of methicillin-resistant  Staphylococcus aureus (MRSA) from Staphylococcus aureus (SA). The assay  detects DNA from nasal swabs in patients atrisk for nasal colonization.  It is not intended to diagnose MRSA or SA infections nor guide or monitor  treatment for MRSA/SA infections. A negative result does not preclude  nasal colonization. The Real-Time PCR assay is FDA-approved, and its  performance has been established by [facility] [employer], [place], [place].      RADIOLOGY & ADDITIONAL TESTS: Reviewed

## 2022-07-14 NOTE — PROGRESS NOTE ADULT - SUBJECTIVE AND OBJECTIVE BOX
Patient is a 89y old  Female who presents with a chief complaint of Shortness of breath (14 Jul 2022 07:18)      INTERVAL HPI/ OVERNIGHT EVENTS: No event overnight. Patient is still intubated, tolerated pressure support and weaning but due to  too much secretions she was not extubated yet. They are going to  continue CPAP tomorrow and if tolerates will they will consider extubation at AM. Patient restarted on Zosyn due to low grade fever two days ago.     Pain Location & Control: OK    MEDICATIONS  (STANDING):  ALBUTerol    90 MICROgram(s) HFA Inhaler      albuterol/ipratropium for Nebulization 3 milliLiter(s) Nebulizer every 6 hours  amLODIPine   Tablet 10 milliGRAM(s) Oral daily  apixaban 5 milliGRAM(s) Oral every 12 hours  ascorbic acid 500 milliGRAM(s) Oral daily  bethanechol 25 milliGRAM(s) Oral three times a day  carbidopa/levodopa  25/100 1 Tablet(s) Oral three times a day  chlorhexidine 0.12% Liquid 15 milliLiter(s) Oral Mucosa every 12 hours  chlorhexidine 4% Liquid 1 Application(s) Topical <User Schedule>  furosemide    Tablet 40 milliGRAM(s) Oral daily  levothyroxine 100 MICROGram(s) Oral daily  losartan 50 milliGRAM(s) Oral daily  multivitamin 1 Tablet(s) Oral daily  piperacillin/tazobactam IVPB.. 3.375 Gram(s) IV Intermittent every 8 hours    MEDICATIONS  (PRN):  acetaminophen     Tablet .. 650 milliGRAM(s) Oral every 6 hours PRN Temp greater or equal to 38C (100.4F), Mild Pain (1 - 3)      Allergies    No Known Allergies    Intolerances        REVIEW OF SYSTEMS:  CONSTITUTIONAL: No fever, weight loss, or fatigue  EYES: No eye pain, visual disturbances, or discharge  ENMT:  No difficulty hearing, tinnitus, vertigo; No sinus or throat pain  NECK: No pain or stiffness  BREASTS: No pain, masses, or nipple discharge  RESPIRATORY: No cough, wheezing, chills or hemoptysis; No shortness of breath  CARDIOVASCULAR: No chest pain, palpitations, dizziness, or leg swelling  GASTROINTESTINAL: No abdominal or epigastric pain. No nausea, vomiting, or hematemesis; No diarrhea or constipation. No melena or hematochezia.  GENITOURINARY: No dysuria, frequency, hematuria, or incontinence  NEUROLOGICAL: No headaches, memory loss, loss of strength, numbness, or tremors  SKIN: No itching, burning, rashes, or lesions   LYMPH NODES: No enlarged glands  ENDOCRINE: No heat or cold intolerance; No hair loss; No polydipsia or polyuria  MUSCULOSKELETAL: No back pain  PSYCHIATRIC: No depression, anxiety, mood swings, or difficulty sleeping  HEME/LYMPH: No easy bruising, or bleeding gums  ALLERGY AND IMMUNOLOGIC: No hives or eczema    Vital Signs Last 24 Hrs  T(C): 37.4 (14 Jul 2022 16:00), Max: 37.9 (14 Jul 2022 00:00)  T(F): 99.3 (14 Jul 2022 16:00), Max: 100.2 (14 Jul 2022 00:00)  HR: 69 (14 Jul 2022 19:00) (63 - 89)  BP: 141/64 (14 Jul 2022 19:00) (122/57 - 179/92)  BP(mean): 92 (14 Jul 2022 19:00) (82 - 121)  RR: 19 (14 Jul 2022 19:00) (17 - 32)  SpO2: 100% (14 Jul 2022 19:00) (91% - 100%)    Parameters below as of 14 Jul 2022 17:22  Patient On (Oxygen Delivery Method): ventilator        PHYSICAL EXAM:  GENERAL: NAD, well-groomed, well-developed  HEAD:  Atraumatic, Normocephalic  EYES: EOMI, PERRLA, conjunctiva and sclera clear  ENMT: No tonsillar erythema, exudates, or enlargement; Moist mucous membranes, Good dentition, No lesions  NECK: Supple, No JVD, Normal thyroid  NERVOUS SYSTEM:  Alert & Oriented X3, Good concentration; Motor Strength 5/5 B/L upper and lower extremities; DTRs 2+ intact and symmetric  CHEST/LUNG: Clear to auscultation bilaterally; No rales, rhonchi, wheezing, or rubs  HEART: Regular rate and rhythm; No murmurs, rubs, or gallops  ABDOMEN: Soft, Nontender, Nondistended; Bowel sounds present  EXTREMITIES:  2+ Peripheral Pulses, No clubbing or cyanosis  LYMPH: No lymphadenopathy noted  SKIN: No rashes or lesio    LABS:                        9.6    8.00  )-----------( 243      ( 14 Jul 2022 00:50 )             31.2     14 Jul 2022 00:50    150    |  105    |  30     ----------------------------<  124    3.6     |  31     |  0.80     Ca    8.6        14 Jul 2022 00:50  Phos  4.0       14 Jul 2022 00:50  Mg     2.7       14 Jul 2022 00:50    TPro  6.9    /  Alb  3.7    /  TBili  0.4    /  DBili  x      /  AST  12     /  ALT  <5     /  AlkPhos  61     14 Jul 2022 00:50    PT/INR - ( 14 Jul 2022 00:50 )   PT: 16.4 sec;   INR: 1.41 ratio         PTT - ( 14 Jul 2022 00:50 )  PTT:31.0 sec    CAPILLARY BLOOD GLUCOSE            Cultures  Culture Results:   No growth to date. (07-13-22 @ 00:29)  Culture Results:   No growth to date. (07-13-22 @ 00:29)  Culture Results:   No Growth Final (07-09-22 @ 02:09)  Culture Results:   >100,000 CFU/ml Escherichia coli (07-09-22 @ 01:37)  Culture Results:   No Growth Final (07-09-22 @ 00:30)      RADIOLOGY & ADDITIONAL TESTS:    Imaging Personally Reviewed:  [X ] YES  [ ] NO    Consultant(s) Notes Reviewed:  [X ] YES  [ ] NO    Care Discussed with Consultants/Other Providers [X ] YES  [ ] NO

## 2022-07-14 NOTE — PROGRESS NOTE ADULT - ASSESSMENT
89-Year-old Female PMH Dementia, Parkinson's Disease, Dysphagia, DVT, Muscle Weakness, Gait Instability, Pressure Ulcer, DVT, HTN, HYPOTHYROIDISM, DEPRESSION, PARKINSONS, MOOD DISORDER, DEMENTIA, DECUBITUS ULCERS, SEPSIS, REFLUX, INSOMNIA, CONSTIPATION, HLD who was found hypoxic in SNF last night, she was treated with one dose of IM lasix and IV Zosyn, O2 initially improved but then dropped again to 80s with respiratory distress. She was transferred to [facility] for more evaluation due to acute respiratory failure. She was found with Co2 retention and acidosis. She was started on BIPAP and IV broad abx therapy and was admitted in MICU.     Acute hypercapnic / hypoxic respiratory failure - s/p intubation on AC, sedated with Fentanyl transition to Precedex  f/u with icu as primary team.  Patient still on vent, continue weaning. Still  pressure support. Consider to extubate at AM.   Aspiration pneumonia - D/C Vancomycin. Resume IV Zosyn by primary ICU team.   HF - ? with elevated proBNP, on lasix 40 mg IV, Echo with normal EF.   palliative - consult palliative care. Patient is still full code.   depression/anxiety- at home was on  Escitalopram 10 mg. F/u psych   weight loss- improved on puree diet with supplementary diet   sacral wound stage 4- Continue Collagen topical powder and calcium alginate with zinc oxide to periwound. F/u wound care team. Offload pressure.  HTN- home meds as Losartan and Amlodipine 10 mg   HLD - home meds as Simvastatin 20 mg on hold   urinary retention - home meds as  urecholine TID on hold   DVT ppx- Eliquis 5 mg BID  Parkinson's - Sinemet 25/100 mg TID  Hypothyroidism- Synthroid to 100 mcg.

## 2022-07-14 NOTE — CHART NOTE - NSCHARTNOTEFT_GEN_A_CORE
At this time, no emergent need for medical decision making that would warrant 2PC or petition to court  In the event that exists, please reconsult and we can help guide, however, may also need to include ethics and legal if guardian still unable/unwilling or unavailable to assist. signing off.   [phone]

## 2022-07-14 NOTE — PROGRESS NOTE ADULT - ASSESSMENT
89 year old female with PMH of Parkinson's disease, HTN, chronic DVT on Eliquis, and hypothyroidism who was admitted for acute hypoxic respiratory failure most likely 2/2 PNA, currently intubated and treated with zosyn.    Neuro:  - AOx1 on presentation, unknown baseline   - roby on precedex- d/maryam. Weaning off sedation today, will monitor ABG to see if ready for extubation   - unequal pupils on exam since admission  - CT head with no acute intracranial hemorrhage, hydrocephalus or extra-axial fluid collection. mild parenchymal volume loss and mild chronic microvascular ischemic changes    #Parkinson  -continue carbidopa/levodopa via OG tube     Cardiovascular:  #Heart-failure? / fluid overloaded  - BNP 3075 on 7/9  - echo with EF of 55% and mild aortic regurgitation,  paradoxical septal wall motion abnormality due to conduction abnormality  - last dose of 40mg IV lasix on 7/13- will give home 40 mg lasix PO starting tomorrow  - monitor I/Os    #Chronic DVT  - CT head negative  - apixaban 5mg q12hrs    #Hypertension  - c/w home amlodipine     Pulmonary:  #Acute Hypoxic Respiratory Failure   - Intubated, Vent (A/C) Volume: 400 Rate: 24 FiO2: 21 PEEP: 5  - pt has been doing CPAP trials  - repeat ABG this morning, will evaluate for possible extubation today  - lowered rate and off sedation  - restarted duonebs after pt is having thick secretions    #Pneumonia  - Finished ceftriaxone yesterday. D/maryam Vancomycin  - restarted zosyn after pt spiked fever  - Consider bronchoscopy for mucus plug   - sputum cultures NGTD    #COPD?  -per guardian, pt does not have a hx of COPD and if she did smoke, it was many years ago   - Uses fluticasone and Duoneb at nursing home, will continue    GI/Nutrition:  - No active issues   - tube feeds via OG tube     Renal:  - Creatinine 0.87  - Monitor urine output, foster  - 40 IV lasix -today last day, will resume home 40 mg oral lasix tomorrow  - f/u Cr, CMP daily    ID:  -Septic W/U for Aspiration PNA, UA postitive UTI, and Stage IV Decubital Wound, Tmax 101.9  -pt febrile overnight, sent out blood cultures. If febrile again, will re-start zosyn.   - Vanc (7/9-7/11 ) Zosyn (7/9-7/11) Ceftriaxone (7/11-7/13) Zosyn (7/13-)  - d/maryam Vancomycin   - Nasal Swab for MRSA negative; staph aureus detected  - blood, urine, and sputum cultures NGTD  - if fever curve remains elevated would check RP/Kidney US    Heme:   #anemia  - Hemoglobin 9.6 (9.7) -admitted with 11.4, unclear baseline. No active bleeding   - CBC daily     Endocrine:  #Hypothyroidism  - will continue home med, Synthroid     Skin  #decubitus ulcer, stage 4   - wound care consult  -Sacral wound- Aquacel dressing QD  -BLE elevation  -Abx per MICU  -Moisturize intact skin w/ SWEEN cream BID  -Nutrition Consult for optimization in pt w/ Increased nutritional needs  -Continue turning and positioning w/ offloading assistive devices as per protocol      Ethics:  - FULL CODE pending clarification with guardian. Spoke to guardian on 7/11 after not being able to reach her- she stated that she does not have authority to make decisions on code status unless she goes to court with a doctor. She will try to reach out to nursing home to see if they ever filled out a MOLST form for the patient.   Guardianship paperwork obtained and in chart- palliative care team is aware.    DVT: Eliquis

## 2022-07-15 LAB
ALBUMIN SERPL ELPH-MCNC: 3.3 G/DL — SIGNIFICANT CHANGE UP (ref 3.3–5)
ALP SERPL-CCNC: 57 U/L — SIGNIFICANT CHANGE UP (ref 40–120)
ALT FLD-CCNC: <5 U/L — LOW (ref 10–45)
ANION GAP SERPL CALC-SCNC: 10 MMOL/L — SIGNIFICANT CHANGE UP (ref 5–17)
APTT BLD: 31.5 SEC — SIGNIFICANT CHANGE UP (ref 27.5–35.5)
AST SERPL-CCNC: 7 U/L — LOW (ref 10–40)
BILIRUB SERPL-MCNC: 0.3 MG/DL — SIGNIFICANT CHANGE UP (ref 0.2–1.2)
BUN SERPL-MCNC: 30 MG/DL — HIGH (ref 7–23)
CALCIUM SERPL-MCNC: 8.7 MG/DL — SIGNIFICANT CHANGE UP (ref 8.4–10.5)
CHLORIDE SERPL-SCNC: 107 MMOL/L — SIGNIFICANT CHANGE UP (ref 96–108)
CO2 SERPL-SCNC: 33 MMOL/L — HIGH (ref 22–31)
CREAT SERPL-MCNC: 0.71 MG/DL — SIGNIFICANT CHANGE UP (ref 0.5–1.3)
EGFR: 80 ML/MIN/1.73M2 — SIGNIFICANT CHANGE UP
GAS PNL BLDA: SIGNIFICANT CHANGE UP
GLUCOSE SERPL-MCNC: 146 MG/DL — HIGH (ref 70–99)
HCT VFR BLD CALC: 29.6 % — LOW (ref 34.5–45)
HGB BLD-MCNC: 9.1 G/DL — LOW (ref 11.5–15.5)
INR BLD: 1.52 RATIO — HIGH (ref 0.88–1.16)
MAGNESIUM SERPL-MCNC: 2.6 MG/DL — SIGNIFICANT CHANGE UP (ref 1.6–2.6)
MCHC RBC-ENTMCNC: 29.9 PG — SIGNIFICANT CHANGE UP (ref 27–34)
MCHC RBC-ENTMCNC: 30.7 GM/DL — LOW (ref 32–36)
MCV RBC AUTO: 97.4 FL — SIGNIFICANT CHANGE UP (ref 80–100)
NRBC # BLD: 0 /100 WBCS — SIGNIFICANT CHANGE UP (ref 0–0)
PHOSPHATE SERPL-MCNC: 4.1 MG/DL — SIGNIFICANT CHANGE UP (ref 2.5–4.5)
PLATELET # BLD AUTO: 253 K/UL — SIGNIFICANT CHANGE UP (ref 150–400)
POTASSIUM SERPL-MCNC: 3.9 MMOL/L — SIGNIFICANT CHANGE UP (ref 3.5–5.3)
POTASSIUM SERPL-SCNC: 3.9 MMOL/L — SIGNIFICANT CHANGE UP (ref 3.5–5.3)
PROT SERPL-MCNC: 6.6 G/DL — SIGNIFICANT CHANGE UP (ref 6–8.3)
PROTHROM AB SERPL-ACNC: 17.5 SEC — HIGH (ref 10.5–13.4)
RBC # BLD: 3.04 M/UL — LOW (ref 3.8–5.2)
RBC # FLD: 15 % — HIGH (ref 10.3–14.5)
SARS-COV-2 RNA SPEC QL NAA+PROBE: SIGNIFICANT CHANGE UP
SODIUM SERPL-SCNC: 150 MMOL/L — HIGH (ref 135–145)
WBC # BLD: 6.23 K/UL — SIGNIFICANT CHANGE UP (ref 3.8–10.5)
WBC # FLD AUTO: 6.23 K/UL — SIGNIFICANT CHANGE UP (ref 3.8–10.5)

## 2022-07-15 PROCEDURE — 99291 CRITICAL CARE FIRST HOUR: CPT

## 2022-07-15 RX ORDER — FUROSEMIDE 40 MG
20 TABLET ORAL ONCE
Refills: 0 | Status: COMPLETED | OUTPATIENT
Start: 2022-07-15 | End: 2022-07-15

## 2022-07-15 RX ORDER — LANOLIN ALCOHOL/MO/W.PET/CERES
3 CREAM (GRAM) TOPICAL AT BEDTIME
Refills: 0 | Status: DISCONTINUED | OUTPATIENT
Start: 2022-07-15 | End: 2022-07-26

## 2022-07-15 RX ADMIN — CARBIDOPA AND LEVODOPA 1 TABLET(S): 25; 100 TABLET ORAL at 14:05

## 2022-07-15 RX ADMIN — CARBIDOPA AND LEVODOPA 1 TABLET(S): 25; 100 TABLET ORAL at 21:52

## 2022-07-15 RX ADMIN — Medication 40 MILLIGRAM(S): at 06:12

## 2022-07-15 RX ADMIN — PIPERACILLIN AND TAZOBACTAM 25 GRAM(S): 4; .5 INJECTION, POWDER, LYOPHILIZED, FOR SOLUTION INTRAVENOUS at 09:50

## 2022-07-15 RX ADMIN — CARBIDOPA AND LEVODOPA 1 TABLET(S): 25; 100 TABLET ORAL at 06:11

## 2022-07-15 RX ADMIN — CHLORHEXIDINE GLUCONATE 15 MILLILITER(S): 213 SOLUTION TOPICAL at 06:11

## 2022-07-15 RX ADMIN — Medication 3 MILLILITER(S): at 11:12

## 2022-07-15 RX ADMIN — Medication 100 MICROGRAM(S): at 06:11

## 2022-07-15 RX ADMIN — AMLODIPINE BESYLATE 10 MILLIGRAM(S): 2.5 TABLET ORAL at 06:12

## 2022-07-15 RX ADMIN — CHLORHEXIDINE GLUCONATE 1 APPLICATION(S): 213 SOLUTION TOPICAL at 06:12

## 2022-07-15 RX ADMIN — Medication 25 MILLIGRAM(S): at 18:16

## 2022-07-15 RX ADMIN — APIXABAN 5 MILLIGRAM(S): 2.5 TABLET, FILM COATED ORAL at 06:12

## 2022-07-15 RX ADMIN — Medication 1 TABLET(S): at 11:53

## 2022-07-15 RX ADMIN — APIXABAN 5 MILLIGRAM(S): 2.5 TABLET, FILM COATED ORAL at 18:08

## 2022-07-15 RX ADMIN — Medication 3 MILLILITER(S): at 05:42

## 2022-07-15 RX ADMIN — Medication 650 MILLIGRAM(S): at 20:00

## 2022-07-15 RX ADMIN — Medication 20 MILLIGRAM(S): at 09:28

## 2022-07-15 RX ADMIN — PIPERACILLIN AND TAZOBACTAM 25 GRAM(S): 4; .5 INJECTION, POWDER, LYOPHILIZED, FOR SOLUTION INTRAVENOUS at 01:36

## 2022-07-15 RX ADMIN — LOSARTAN POTASSIUM 50 MILLIGRAM(S): 100 TABLET, FILM COATED ORAL at 06:11

## 2022-07-15 RX ADMIN — Medication 650 MILLIGRAM(S): at 18:07

## 2022-07-15 RX ADMIN — Medication 25 MILLIGRAM(S): at 06:12

## 2022-07-15 RX ADMIN — Medication 500 MILLIGRAM(S): at 11:53

## 2022-07-15 NOTE — CHART NOTE - NSCHARTNOTEFT_GEN_A_CORE
Nutrition Follow Up Note  Patient seen for: length of stay on follow up. Chart reviewed, events noted.    Per Chart: Pt is a 89 year old female with PMH of Parkinson's disease, HTN, chronic DVT on Eliquis, and hypothyroidism who was admitted for acute hypoxic respiratory failure most likely 2/2 PNA.    Source: [x] Patient   (However, confused and asked RD if they are dead)   [x] Medical Record        [x] RN        [] Family at bedside       [] Other:    -If unable to interview patient: [] Trach/Vent/BiPAP  [] Disoriented/confused/inappropriate to interview    Nutrition-Related Events:   - Pressors:  [] Yes    [x] No   - Propofol:  [] Yes    [x] No         - Rate: __mL/hr. If maintained x 24 hours, propofol will provide:     Diet Order:   Diet, NPO with Tube Feed:   Tube Feeding Modality: Orogastric  Jevity 1.2 Sg ([id number])  Total Volume for 24 Hours (mL): 900  Intermittent  Starting Tube Feed Rate {mL per Hour}: 20  Increase Tube Feed Rate by (mL): 10    Every 4 hours  Until Goal Tube Feed Rate (mL per Hour): 50  Tube Feeding Hours ON: 18  Tube Feeding OFF (Hours): 6  Tube Feed Start Time: 01:00  No Carb Prosource TF     Qty per Day:  2 (07-11-22)  Not presently on as pt just s/p extubation   5-Day EN Average: 848 mL, 1018 kcals, and 47 gm protein   5-Day EN Average + Protein: 1098 kcals (79.5% lower estimated energy needs), and 69 gm protein     EN Order Provides: N/A as pt now extubated and plan to be on PO diet    Is current diet order appropriate/adequate? [] Yes  []  No:     PO intake :   [] >75%  Adequate    [] 50-75%  Fair       [] <50%  Poor  [x] Not Applicable at this time     Nutrition-related concerns:  - Extubated 7/15.   - On multivitamin and Vitamin C   - Ordered for carbidopa/levodopa tid and oral levothyroxine     GI: No known acute GI issues. Last BM 7/15.   Bowel Regimen? [] Yes   [x] No    Weights:   No daily wts to address  Dosing wt: 60.1 kG  Ht: 157.5 cm  BMI: 24.2 kG/m2    RD unable to perform nutrition focused physical exam as pt confused during RD visit     MEDICATIONS  (STANDING):  amLODIPine   Tablet 10 milliGRAM(s) Oral daily  apixaban 5 milliGRAM(s) Oral every 12 hours  ascorbic acid 500 milliGRAM(s) Oral daily  bethanechol 25 milliGRAM(s) Oral three times a day  carbidopa/levodopa  25/100 1 Tablet(s) Oral three times a day  furosemide    Tablet 40 milliGRAM(s) Oral daily  levothyroxine 100 MICROGram(s) Oral daily  losartan 50 milliGRAM(s) Oral daily  multivitamin 1 Tablet(s) Oral daily    Pertinent Labs: 07-15 @ 00:46: Na 150<H>, BUN 30<H>, Cr 0.71, <H>, K+ 3.9, Phos 4.1, Mg 2.6, Alk Phos 57, ALT/SGPT <5<L>, AST/SGOT 7<L>    Skin per nursing documentation: Stage IV pressure injury sacrum   Edema per nursing documentation: +1 generalized     Based on dosing wt 60.1 kG with consideration for advanced age and pressure injury  Estimated Energy Needs: (23-28 kcals/kG) 3009-3328 kcals  Estimated Protein Needs: (1.2-1.6 gm/kG) 72-96 gm  Fluid needs deferred to provider.   [place] Equation: N/A    Previous Nutrition Diagnosis: Increased protein-energy needs  Nutrition Diagnosis is: [x] ongoing  [] resolved [] not applicable     Nutrition Care Plan:  [] In Progress  [] Achieved  [x] Not applicable - Pending diet order change    New Nutrition Diagnosis: [x] Not Applicable     Nutrition Interventions:     Education Provided:       [] Yes:  [x] No: Not appropriate at this time.    Recommendations:     1) Given advanced age, recommend Regular diet. Consistency deferred to SLP and provider.   2) Ensure Enlive x2 daily   3) As medically feasible, continue to provide micronutrients.     Monitoring and Evaluation:   Continue to monitor nutritional intake, tolerance to diet prescription, weights, labs, skin integrity    RD remains available upon request and will follow up per protocol  Giana Yung, RD, MS, CDN, [facility] Pager #[phone]

## 2022-07-15 NOTE — PROGRESS NOTE ADULT - SUBJECTIVE AND OBJECTIVE BOX
Subjective / Overnight Events: Pt on SBT trial this morning.    Additional ROS (if any):    MEDICATIONS  (STANDING):  amLODIPine   Tablet 10 milliGRAM(s) Oral daily  apixaban 5 milliGRAM(s) Oral every 12 hours  bethanechol 25 milliGRAM(s) Oral three times a day  carbidopa/levodopa  25/100 1 Tablet(s) Oral three times a day  chlorhexidine 0.12% Liquid 15 milliLiter(s) Oral Mucosa every 12 hours  chlorhexidine 4% Liquid 1 Application(s) Topical <User Schedule>  collagenase Ointment 1 Application(s) Topical daily  dexMEDEtomidine Infusion 0.2 MICROgram(s)/kG/Hr (3.06 mL/Hr) IV Continuous <Continuous>  fentaNYL   Infusion... 1 MICROgram(s)/kG/Hr (3.06 mL/Hr) IV Continuous <Continuous>  furosemide    Tablet 40 milliGRAM(s) Oral daily  levothyroxine 100 MICROGram(s) Oral daily  losartan 50 milliGRAM(s) Oral daily  metoprolol succinate ER 50 milliGRAM(s) Oral daily  mometasone 220 MICROgram(s) Inhaler 1 Puff(s) Inhalation daily  piperacillin/tazobactam IVPB. 3.375 Gram(s) IV Intermittent once  piperacillin/tazobactam IVPB.. 3.375 Gram(s) IV Intermittent every 8 hours  vancomycin  IVPB 1000 milliGRAM(s) IV Intermittent every 12 hours    MEDICATIONS  (PRN):    POCT  Blood Glucose (07.10.22 @ 17:13)    POCT Blood Glucose.: 116 mg/dL    I&O's Summary    14 Jul 2022 07:01  -  15 Jul 2022 07:00  --------------------------------------------------------  IN: 1525 mL / OUT: 450 mL / NET: 1075 mL    ICU Vital Signs Last 24 Hrs  T(C): 37.2 (15 Jul 2022 04:00), Max: 37.6 (15 Jul 2022 00:00)  T(F): 99 (15 Jul 2022 04:00), Max: 99.7 (15 Jul 2022 00:00)  HR: 71 (15 Jul 2022 07:00) (61 - 82)  BP: 145/64 (15 Jul 2022 07:00) (114/57 - 162/74)  BP(mean): 92 (15 Jul 2022 07:00) (82 - 107)  ABP: --  ABP(mean): --  RR: 17 (15 Jul 2022 07:00) (14 - 29)  SpO2: 100% (15 Jul 2022 07:00) (91% - 100%)    O2 Parameters below as of 15 Jul 2022 05:45  Patient On (Oxygen Delivery Method): ventilator      PHYSICAL EXAM:    General: Patient is intubated. No apparent distress.   HEENT: PERRLA, EOMi, no scleral icterus  CV: RRR, normal S1 and S2, no m/r/g  Lungs: normal respiratory effort. CTAB, no wheezes, rales, or rhonchi  Abd: soft, nontender, nondistended  Ext: no edema, 2+ peripheral pulses   Neuro: grossly non-focal, moving all extremities spontaneously   Skin: no rashes or lesions     CBC Full  -  ( 15 Jul 2022 00:46 )  WBC Count : 6.23 K/uL  RBC Count : 3.04 M/uL  Hemoglobin : 9.1 g/dL  Hematocrit : 29.6 %  Platelet Count - Automated : 253 K/uL  Mean Cell Volume : 97.4 fl  Mean Cell Hemoglobin : 29.9 pg  Mean Cell Hemoglobin Concentration : 30.7 gm/dL  Auto Neutrophil # : x  Auto Lymphocyte # : x  Auto Monocyte # : x  Auto Eosinophil # : x  Auto Basophil # : x  Auto Neutrophil % : x  Auto Lymphocyte % : x  Auto Monocyte % : x  Auto Eosinophil % : x  Auto Basophil % : x    07-15    150<H>  |  107  |  30<H>  ----------------------------<  146<H>  3.9   |  33<H>  |  0.71    Ca    8.7      15 Jul 2022 00:46  Phos  4.1     07-15  Mg     2.6     07-15    TPro  6.6  /  Alb  3.3  /  TBili  0.3  /  DBili  x   /  AST  7<L>  /  ALT  <5<L>  /  AlkPhos  57  07-15      MRSA/MSSA PCR (07.11.22 @ 11:58)    MRSA PCR Result.: NotDetec: MRSA/MSSA PCR assay is a qualitative in vitro diagnostic test for the  direct detection and differentiation of methicillin-resistant  Staphylococcus aureus (MRSA) from Staphylococcus aureus (SA). The assay  detects DNA from nasal swabs in patients atrisk for nasal colonization.  It is not intended to diagnose MRSA or SA infections nor guide or monitor  treatment for MRSA/SA infections. A negative result does not preclude  nasal colonization. The Real-Time PCR assay is FDA-approved, and its  performance has been established by [facility] [employer], [place], [place].      RADIOLOGY & ADDITIONAL TESTS: Reviewed   Subjective / Overnight Events: Pt tolerated SBT trial this morning and was extubated, administered 20 lasix prior.     Additional ROS (if any):    MEDICATIONS  (STANDING):  amLODIPine   Tablet 10 milliGRAM(s) Oral daily  apixaban 5 milliGRAM(s) Oral every 12 hours  bethanechol 25 milliGRAM(s) Oral three times a day  carbidopa/levodopa  25/100 1 Tablet(s) Oral three times a day  chlorhexidine 0.12% Liquid 15 milliLiter(s) Oral Mucosa every 12 hours  chlorhexidine 4% Liquid 1 Application(s) Topical <User Schedule>  collagenase Ointment 1 Application(s) Topical daily  dexMEDEtomidine Infusion 0.2 MICROgram(s)/kG/Hr (3.06 mL/Hr) IV Continuous <Continuous>  fentaNYL   Infusion... 1 MICROgram(s)/kG/Hr (3.06 mL/Hr) IV Continuous <Continuous>  furosemide    Tablet 40 milliGRAM(s) Oral daily  levothyroxine 100 MICROGram(s) Oral daily  losartan 50 milliGRAM(s) Oral daily  metoprolol succinate ER 50 milliGRAM(s) Oral daily  mometasone 220 MICROgram(s) Inhaler 1 Puff(s) Inhalation daily  piperacillin/tazobactam IVPB. 3.375 Gram(s) IV Intermittent once  piperacillin/tazobactam IVPB.. 3.375 Gram(s) IV Intermittent every 8 hours  vancomycin  IVPB 1000 milliGRAM(s) IV Intermittent every 12 hours    MEDICATIONS  (PRN):    POCT  Blood Glucose (07.10.22 @ 17:13)    POCT Blood Glucose.: 116 mg/dL    I&O's Summary    14 Jul 2022 07:01  -  15 Jul 2022 07:00  --------------------------------------------------------  IN: 1525 mL / OUT: 450 mL / NET: 1075 mL    ICU Vital Signs Last 24 Hrs  T(C): 37.2 (15 Jul 2022 04:00), Max: 37.6 (15 Jul 2022 00:00)  T(F): 99 (15 Jul 2022 04:00), Max: 99.7 (15 Jul 2022 00:00)  HR: 71 (15 Jul 2022 07:00) (61 - 82)  BP: 145/64 (15 Jul 2022 07:00) (114/57 - 162/74)  BP(mean): 92 (15 Jul 2022 07:00) (82 - 107)  ABP: --  ABP(mean): --  RR: 17 (15 Jul 2022 07:00) (14 - 29)  SpO2: 100% (15 Jul 2022 07:00) (91% - 100%)    O2 Parameters below as of 15 Jul 2022 05:45  Patient On (Oxygen Delivery Method): ventilator      PHYSICAL EXAM:    General: Patient is intubated. No apparent distress.   HEENT: PERRLA, EOMi, no scleral icterus  CV: RRR, normal S1 and S2, no m/r/g  Lungs: normal respiratory effort. CTAB, no wheezes, rales, or rhonchi  Abd: soft, nontender, nondistended  Ext: no edema, 2+ peripheral pulses   Neuro: grossly non-focal, moving all extremities spontaneously   Skin: no rashes or lesions     CBC Full  -  ( 15 Jul 2022 00:46 )  WBC Count : 6.23 K/uL  RBC Count : 3.04 M/uL  Hemoglobin : 9.1 g/dL  Hematocrit : 29.6 %  Platelet Count - Automated : 253 K/uL  Mean Cell Volume : 97.4 fl  Mean Cell Hemoglobin : 29.9 pg  Mean Cell Hemoglobin Concentration : 30.7 gm/dL  Auto Neutrophil # : x  Auto Lymphocyte # : x  Auto Monocyte # : x  Auto Eosinophil # : x  Auto Basophil # : x  Auto Neutrophil % : x  Auto Lymphocyte % : x  Auto Monocyte % : x  Auto Eosinophil % : x  Auto Basophil % : x    07-15    150<H>  |  107  |  30<H>  ----------------------------<  146<H>  3.9   |  33<H>  |  0.71    Ca    8.7      15 Jul 2022 00:46  Phos  4.1     07-15  Mg     2.6     07-15    TPro  6.6  /  Alb  3.3  /  TBili  0.3  /  DBili  x   /  AST  7<L>  /  ALT  <5<L>  /  AlkPhos  57  07-15      MRSA/MSSA PCR (07.11.22 @ 11:58)    MRSA PCR Result.: NotDetec: MRSA/MSSA PCR assay is a qualitative in vitro diagnostic test for the  direct detection and differentiation of methicillin-resistant  Staphylococcus aureus (MRSA) from Staphylococcus aureus (SA). The assay  detects DNA from nasal swabs in patients atrisk for nasal colonization.  It is not intended to diagnose MRSA or SA infections nor guide or monitor  treatment for MRSA/SA infections. A negative result does not preclude  nasal colonization. The Real-Time PCR assay is FDA-approved, and its  performance has been established by [facility] [employer], [place], [place].      RADIOLOGY & ADDITIONAL TESTS: Reviewed

## 2022-07-15 NOTE — CHART NOTE - NSCHARTNOTEFT_GEN_A_CORE
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 Limited history, obtained through paperwork from nursing home. Patient was suspected to have bilateral pneumonia based on chest X-ray at nursing home yesterday 7/8. On initial presentation patient was afebrile, had non labored breathing on 6L nasal canula, and was AOx1. In the ED, patient was found to be hypercapnic with a PCO2 of 104 and pH of 7.19 off a VBG. Patient was placed on avaps; however she continued to be hypercapnic and acidotic with repeat VBG showing PCO2 of 117 and pH of 7.09. At this time, patient was intubated.     INTERVAL HPI/OVERNIGHT EVENTS: Pt was treated with zosyn for suspected aspiration PNA, eventually switched to ceftriaxone when urine cultures showed E. coli. Pt finished ceftriaxone and then spiked a fever so was started again on zosyn (last day today). Pt required full pressure support for most of her time in the MICU, however was weaned off successfully and extubated on 7/15.     Pt is hemodynamically stable and comfortable on nasal cannula, ready for transfer to floor.      REVIEW OF SYSTEMS:  CONSTITUTIONAL: No fever, chills  HEENT:  No blurry vision No sinus or throat pain  NECK: No pain or stiffness  RESPIRATORY: No cough, wheezing, chills or hemoptysis; No shortness of breath  CARDIOVASCULAR: No chest pain, palpitations  GASTROINTESTINAL: No abdominal pain. No nausea, vomiting, or diarrhea  GENITOURINARY: No dysuria  NEUROLOGICAL: No HA, No focal weakness  SKIN: No itching, burning, rashes, or lesions   MUSCULOSKELETAL: No joint pain or swelling; No muscle, back, or extremity pain    MEDICATIONS:  acetaminophen     Tablet .. 650 milliGRAM(s) Oral every 6 hours PRN  ALBUTerol    90 MICROgram(s) HFA Inhaler      albuterol/ipratropium for Nebulization 3 milliLiter(s) Nebulizer every 6 hours  amLODIPine   Tablet 10 milliGRAM(s) Oral daily  apixaban 5 milliGRAM(s) Oral every 12 hours  ascorbic acid 500 milliGRAM(s) Oral daily  bethanechol 25 milliGRAM(s) Oral three times a day  carbidopa/levodopa  25/100 1 Tablet(s) Oral three times a day  chlorhexidine 4% Liquid 1 Application(s) Topical <User Schedule>  furosemide    Tablet 40 milliGRAM(s) Oral daily  levothyroxine 100 MICROGram(s) Oral daily  losartan 50 milliGRAM(s) Oral daily  multivitamin 1 Tablet(s) Oral daily      T(C): 37.3 (07-15-22 @ 08:00), Max: 37.6 (07-15-22 @ 00:00)  HR: 67 (07-15-22 @ 11:00) (61 - 88)  BP: 138/62 (07-15-22 @ 11:00) (114/57 - 162/74)  RR: 19 (07-15-22 @ 11:00) (14 - 29)  SpO2: 100% (07-15-22 @ 11:00) (93% - 100%)  Wt(kg): --Vital Signs Last 24 Hrs  T(C): 37.3 (15 Jul 2022 08:00), Max: 37.6 (15 Jul 2022 00:00)  T(F): 99.1 (15 Jul 2022 08:00), Max: 99.7 (15 Jul 2022 00:00)  HR: 67 (15 Jul 2022 11:00) (61 - 88)  BP: 138/62 (15 Jul 2022 11:00) (114/57 - 162/74)  BP(mean): 89 (15 Jul 2022 11:00) (82 - 107)  RR: 19 (15 Jul 2022 11:00) (14 - 29)  SpO2: 100% (15 Jul 2022 11:00) (93% - 100%)    Parameters below as of 15 Jul 2022 10:13      O2 Concentration (%): 30    PHYSICAL EXAM:  GENERAL: NAD, well-groomed, well-developed  HEAD:  Atraumatic, Normocephalic  EYES: EOMI, PERRLA, conjunctiva and sclera clear  ENMT:  Moist mucous membranes  NECK: Supple, No JVD,  CHEST/LUNG: Clear to auscultation bilaterally; No rales, rhonchi, wheezing, or rubs  HEART: Regular rate and rhythm; No murmurs, rubs, or gallops  ABDOMEN: Soft, Nontender, Nondistended; Bowel sounds present  NEURO: Alert & Oriented X3  EXTREMITIES: No LE edema, no calf tenderness  LYMPH: No lymphadenopathy noted  SKIN: No rashes or lesions    Consultant(s) Notes Reviewed:  [x ] YES  [ ] NO  Care Discussed with Consultants/Other Providers [ x] YES  [ ] NO    LABS:                        9.1    6.23  )-----------( 253      ( 15 Jul 2022 00:46 )             29.6     07-15    150<H>  |  107  |  30<H>  ----------------------------<  146<H>  3.9   |  33<H>  |  0.71    Ca    8.7      15 Jul 2022 00:46  Phos  4.1     07-15  Mg     2.6     07-15    TPro  6.6  /  Alb  3.3  /  TBili  0.3  /  DBili  x   /  AST  7<L>  /  ALT  <5<L>  /  AlkPhos  57  07-15    PT/INR - ( 15 Jul 2022 00:46 )   PT: 17.5 sec;   INR: 1.52 ratio         PTT - ( 15 Jul 2022 00:46 )  PTT:31.5 sec    CAPILLARY BLOOD GLUCOSE      RADIOLOGY & ADDITIONAL TESTS:    Imaging Personally Reviewed:  [x ] YES  [ ] NO      To Do:    -pt owned by the state, with legal guardian unable to make decisions on code status. Palliative care signed off as there is no emergent need for medical decision making that would warrant 2PC or petition to court. In the event that exists, please reconsult palliative care and they will help guide, however, may also need to include ethics and legal if guardian still unable/unwilling or unavailable to assist.   -f/u BMP (pt given lasix this morning) MICU DOWN GRADE NOTE    Accepting Attending:  Unit:    Patient is a 89y old  Female who presents with a chief complaint of Shortness of breath (15 Jul 2022 07:24)    HPI: 89 year old female with PMH of Parkinson's disease, HTN, chronic DVT on Eliquis, and hypothyroidism who was brought in by EMS for shortness of breath. Limited history, obtained through paperwork from nursing home. Patient was suspected to have bilateral pneumonia based on chest X-ray at nursing home yesterday 7/8. On initial presentation patient was afebrile, had non labored breathing on 6L nasal canula, and was AOx1. In the ED, patient was found to be hypercapnic with a PCO2 of 104 and pH of 7.19 off a VBG. Patient was placed on avaps; however she continued to be hypercapnic and acidotic with repeat VBG showing PCO2 of 117 and pH of 7.09. At this time, patient was intubated on 7/9 for     INTERVAL HPI/OVERNIGHT EVENTS: Pt was treated with zosyn for suspected aspiration PNA, eventually switched to ceftriaxone when urine cultures showed E. coli. Pt finished ceftriaxone and then spiked a fever so was started again on zosyn (last day today). Pt required full pressure support for most of her time in the MICU, however was weaned off successfully and extubated on 7/15.     Pt is hemodynamically stable and comfortable on nasal cannula, ready for transfer to floor.      REVIEW OF SYSTEMS:  CONSTITUTIONAL: No fever, chills  HEENT:  No blurry vision No sinus or throat pain  NECK: No pain or stiffness  RESPIRATORY: No cough, wheezing, chills or hemoptysis; No shortness of breath  CARDIOVASCULAR: No chest pain, palpitations  GASTROINTESTINAL: No abdominal pain. No nausea, vomiting, or diarrhea  GENITOURINARY: No dysuria  NEUROLOGICAL: No HA, No focal weakness  SKIN: No itching, burning, rashes, or lesions   MUSCULOSKELETAL: No joint pain or swelling; No muscle, back, or extremity pain    MEDICATIONS:  acetaminophen     Tablet .. 650 milliGRAM(s) Oral every 6 hours PRN  ALBUTerol    90 MICROgram(s) HFA Inhaler      albuterol/ipratropium for Nebulization 3 milliLiter(s) Nebulizer every 6 hours  amLODIPine   Tablet 10 milliGRAM(s) Oral daily  apixaban 5 milliGRAM(s) Oral every 12 hours  ascorbic acid 500 milliGRAM(s) Oral daily  bethanechol 25 milliGRAM(s) Oral three times a day  carbidopa/levodopa  25/100 1 Tablet(s) Oral three times a day  chlorhexidine 4% Liquid 1 Application(s) Topical <User Schedule>  furosemide    Tablet 40 milliGRAM(s) Oral daily  levothyroxine 100 MICROGram(s) Oral daily  losartan 50 milliGRAM(s) Oral daily  multivitamin 1 Tablet(s) Oral daily      T(C): 37.3 (07-15-22 @ 08:00), Max: 37.6 (07-15-22 @ 00:00)  HR: 67 (07-15-22 @ 11:00) (61 - 88)  BP: 138/62 (07-15-22 @ 11:00) (114/57 - 162/74)  RR: 19 (07-15-22 @ 11:00) (14 - 29)  SpO2: 100% (07-15-22 @ 11:00) (93% - 100%)  Wt(kg): --Vital Signs Last 24 Hrs  T(C): 37.3 (15 Jul 2022 08:00), Max: 37.6 (15 Jul 2022 00:00)  T(F): 99.1 (15 Jul 2022 08:00), Max: 99.7 (15 Jul 2022 00:00)  HR: 67 (15 Jul 2022 11:00) (61 - 88)  BP: 138/62 (15 Jul 2022 11:00) (114/57 - 162/74)  BP(mean): 89 (15 Jul 2022 11:00) (82 - 107)  RR: 19 (15 Jul 2022 11:00) (14 - 29)  SpO2: 100% (15 Jul 2022 11:00) (93% - 100%)    Parameters below as of 15 Jul 2022 10:13      O2 Concentration (%): 30    PHYSICAL EXAM:  GENERAL: NAD, well-groomed, well-developed  HEAD:  Atraumatic, Normocephalic  EYES: EOMI, PERRLA, conjunctiva and sclera clear  ENMT:  Moist mucous membranes  NECK: Supple, No JVD,  CHEST/LUNG: Clear to auscultation bilaterally; No rales, rhonchi, wheezing, or rubs  HEART: Regular rate and rhythm; No murmurs, rubs, or gallops  ABDOMEN: Soft, Nontender, Nondistended; Bowel sounds present  NEURO: Alert & Oriented X3  EXTREMITIES: No LE edema, no calf tenderness  LYMPH: No lymphadenopathy noted  SKIN: No rashes or lesions    Consultant(s) Notes Reviewed:  [x ] YES  [ ] NO  Care Discussed with Consultants/Other Providers [ x] YES  [ ] NO    LABS:                        9.1    6.23  )-----------( 253      ( 15 Jul 2022 00:46 )             29.6     07-15    150<H>  |  107  |  30<H>  ----------------------------<  146<H>  3.9   |  33<H>  |  0.71    Ca    8.7      15 Jul 2022 00:46  Phos  4.1     07-15  Mg     2.6     07-15    TPro  6.6  /  Alb  3.3  /  TBili  0.3  /  DBili  x   /  AST  7<L>  /  ALT  <5<L>  /  AlkPhos  57  07-15    PT/INR - ( 15 Jul 2022 00:46 )   PT: 17.5 sec;   INR: 1.52 ratio         PTT - ( 15 Jul 2022 00:46 )  PTT:31.5 sec    CAPILLARY BLOOD GLUCOSE      RADIOLOGY & ADDITIONAL TESTS:    Imaging Personally Reviewed:  [x ] YES  [ ] NO      To Do:    -pt owned by the state, with legal guardian unable to make decisions on code status. Palliative care signed off as there is no emergent need for medical decision making that would warrant 2PC or petition to court. In the event that exists, please reconsult palliative care and they will help guide, however, may also need to include ethics and legal if guardian still unable/unwilling or unavailable to assist.   -f/u BMP (pt given lasix this morning) MICU DOWN GRADE NOTE    Accepting Attending: Dr. Huang   Unit:    Patient is a 89y old  Female who presents with a chief complaint of Shortness of breath (15 Jul 2022 07:24)    HPI: 89 year old female with PMH of Parkinson's disease, HTN, chronic DVT on Eliquis, and hypothyroidism who was brought in by EMS for shortness of breath. Limited history, obtained through paperwork from nursing home. Patient was suspected to have bilateral pneumonia based on chest X-ray at nursing home yesterday 7/8. On initial presentation patient was afebrile, had non labored breathing on 6L nasal canula, and was AOx1. In the ED, patient was found to be hypercapnic with a PCO2 of 104 and pH of 7.19 off a VBG. Patient was placed on avaps; however she continued to be hypercapnic and acidotic with repeat VBG showing PCO2 of 117 and pH of 7.09. At this time, patient was intubated on 7/9 for     INTERVAL HPI/OVERNIGHT EVENTS: Pt was treated with zosyn for suspected aspiration PNA, eventually switched to ceftriaxone when urine cultures showed E. coli. Pt finished ceftriaxone and then spiked a fever so was started again on zosyn (last day today). Pt required full pressure support for most of her time in the MICU, however was weaned off successfully and extubated on 7/15.     Pt is hemodynamically stable and comfortable on nasal cannula, ready for transfer to floor.      REVIEW OF SYSTEMS:  CONSTITUTIONAL: No fever, chills  HEENT:  No blurry vision No sinus or throat pain  NECK: No pain or stiffness  RESPIRATORY: No cough, wheezing, chills or hemoptysis; No shortness of breath  CARDIOVASCULAR: No chest pain, palpitations  GASTROINTESTINAL: No abdominal pain. No nausea, vomiting, or diarrhea  GENITOURINARY: No dysuria  NEUROLOGICAL: No HA, No focal weakness  SKIN: No itching, burning, rashes, or lesions   MUSCULOSKELETAL: No joint pain or swelling; No muscle, back, or extremity pain    MEDICATIONS:  acetaminophen     Tablet .. 650 milliGRAM(s) Oral every 6 hours PRN  ALBUTerol    90 MICROgram(s) HFA Inhaler      albuterol/ipratropium for Nebulization 3 milliLiter(s) Nebulizer every 6 hours  amLODIPine   Tablet 10 milliGRAM(s) Oral daily  apixaban 5 milliGRAM(s) Oral every 12 hours  ascorbic acid 500 milliGRAM(s) Oral daily  bethanechol 25 milliGRAM(s) Oral three times a day  carbidopa/levodopa  25/100 1 Tablet(s) Oral three times a day  chlorhexidine 4% Liquid 1 Application(s) Topical <User Schedule>  furosemide    Tablet 40 milliGRAM(s) Oral daily  levothyroxine 100 MICROGram(s) Oral daily  losartan 50 milliGRAM(s) Oral daily  multivitamin 1 Tablet(s) Oral daily      T(C): 37.3 (07-15-22 @ 08:00), Max: 37.6 (07-15-22 @ 00:00)  HR: 67 (07-15-22 @ 11:00) (61 - 88)  BP: 138/62 (07-15-22 @ 11:00) (114/57 - 162/74)  RR: 19 (07-15-22 @ 11:00) (14 - 29)  SpO2: 100% (07-15-22 @ 11:00) (93% - 100%)  Wt(kg): --Vital Signs Last 24 Hrs  T(C): 37.3 (15 Jul 2022 08:00), Max: 37.6 (15 Jul 2022 00:00)  T(F): 99.1 (15 Jul 2022 08:00), Max: 99.7 (15 Jul 2022 00:00)  HR: 67 (15 Jul 2022 11:00) (61 - 88)  BP: 138/62 (15 Jul 2022 11:00) (114/57 - 162/74)  BP(mean): 89 (15 Jul 2022 11:00) (82 - 107)  RR: 19 (15 Jul 2022 11:00) (14 - 29)  SpO2: 100% (15 Jul 2022 11:00) (93% - 100%)    Parameters below as of 15 Jul 2022 10:13      O2 Concentration (%): 30    PHYSICAL EXAM:  GENERAL: NAD, well-groomed, well-developed  HEAD:  Atraumatic, Normocephalic  EYES: EOMI, PERRLA, conjunctiva and sclera clear  ENMT:  Moist mucous membranes  NECK: Supple, No JVD,  CHEST/LUNG: Clear to auscultation bilaterally; No rales, rhonchi, wheezing, or rubs  HEART: Regular rate and rhythm; No murmurs, rubs, or gallops  ABDOMEN: Soft, Nontender, Nondistended; Bowel sounds present  NEURO: Alert & Oriented X3  EXTREMITIES: No LE edema, no calf tenderness  LYMPH: No lymphadenopathy noted  SKIN: No rashes or lesions    Consultant(s) Notes Reviewed:  [x ] YES  [ ] NO  Care Discussed with Consultants/Other Providers [ x] YES  [ ] NO    LABS:                        9.1    6.23  )-----------( 253      ( 15 Jul 2022 00:46 )             29.6     07-15    150<H>  |  107  |  30<H>  ----------------------------<  146<H>  3.9   |  33<H>  |  0.71    Ca    8.7      15 Jul 2022 00:46  Phos  4.1     07-15  Mg     2.6     07-15    TPro  6.6  /  Alb  3.3  /  TBili  0.3  /  DBili  x   /  AST  7<L>  /  ALT  <5<L>  /  AlkPhos  57  07-15    PT/INR - ( 15 Jul 2022 00:46 )   PT: 17.5 sec;   INR: 1.52 ratio         PTT - ( 15 Jul 2022 00:46 )  PTT:31.5 sec    CAPILLARY BLOOD GLUCOSE      RADIOLOGY & ADDITIONAL TESTS:    Imaging Personally Reviewed:  [x ] YES  [ ] NO      To Do:    -pt owned by the state, with legal guardian unable to make decisions on code status. Palliative care signed off as there is no emergent need for medical decision making that would warrant 2PC or petition to court. In the event that exists, please reconsult palliative care and they will help guide, however, may also need to include ethics and legal if guardian still unable/unwilling or unavailable to assist.   -f/u BMP (pt given lasix this morning)

## 2022-07-15 NOTE — PROGRESS NOTE ADULT - ASSESSMENT
89-Year-old Female PMH Dementia, Parkinson's Disease, Dysphagia, DVT, Muscle Weakness, Gait Instability, Pressure Ulcer, DVT, HTN, HYPOTHYROIDISM, DEPRESSION, PARKINSONS, MOOD DISORDER, DEMENTIA, DECUBITUS ULCERS, SEPSIS, REFLUX, INSOMNIA, CONSTIPATION, HLD who was found hypoxic in SNF last night, she was treated with one dose of IM lasix and IV Zosyn, O2 initially improved but then dropped again to 80s with respiratory distress. She was transferred to [facility] for more evaluation due to acute respiratory failure. She was found with Co2 retention and acidosis. She was started on BIPAP and IV broad abx therapy and was admitted in MICU.     Acute hypercapnic / hypoxic respiratory failure - s/p intubation on AC, sedated with Fentanyl transition to Precedex  f/u with icu as primary team.  Patient extubated today 7/15/22, tolerating well.   Aspiration pneumonia -resolved .  D/C  antibiotics.   HF - ? with elevated proBNP, on lasix 40 mg IV, Echo with normal EF.   palliative - consult palliative care. Patient is still full code.   depression/anxiety- at home was on  Escitalopram 10 mg. F/u psych   weight loss- improved on puree diet with supplementary diet   sacral wound stage 4- Continue Collagen topical powder and calcium alginate with zinc oxide to periwound. F/u wound care team. Offload pressure.  HTN- home meds as Losartan and Amlodipine 10 mg   HLD - home meds as Simvastatin 20 mg on hold   urinary retention - home meds as  urecholine TID on hold   DVT ppx- Eliquis 5 mg BID  Parkinson's - Sinemet 25/100 mg TID  Hypothyroidism- Synthroid to 100 mcg.

## 2022-07-15 NOTE — PROGRESS NOTE ADULT - ATTENDING COMMENTS
1. Acute hypoxemic respiratory failure due to aspiration pneumonia. Continue current AC vent settings. PS trial as tolerated.  able to tolerate 10 PS today. Still with large ETT secretions today.. Continue Zosyn.  2, ID. UTI . E coli. Sacral decubitus ulcer without infection clinically. MRSA nasal swab negative. Change abx to ceftriaxone  Stop vancomycin.  3. Parkinson's disease. Continue sinemet.  4. Hold IV lasix  5. DVT prophylaxis. Apixaban  7. GOC. Full code. Will discuss with guardian re obtaining DNR/DNI.
1. Acute hypoxemic respiratory failure due to aspiration pneumonia. Continue current AC vent settings. PS trial as tolerated.  able to tolerate 8 PS today.  ETT secretions  a little today.. Continue Zosyn for 2-3 days. Zosyn. Trial of extubation tomorrow?  2, ID. UTI . E coli. Sacral decubitus ulcer without infection clinically. MRSA nasal swab negative.  3. Parkinson's disease. Continue sinemet.  4. DVT prophylaxis. Apixaban  5. GOC. Full code. Will discuss with guardian re obtaining DNR/DNI.
1. Acute hypoxemic respiratory failure due to aspiration pneumonia. Continue current AC vent settings. PS trial as tolerated.  able to tolerate 8 PS today. Still with large ETT secretions today.. Re start Zosyn for 2-3 days. Zosyn.  2, ID. UTI . E coli. Sacral decubitus ulcer without infection clinically. MRSA nasal swab negative.  3. Parkinson's disease. Continue sinemet.  4. Hold IV lasix  5. DVT prophylaxis. Apixaban  7. GOC. Full code. Will discuss with guardian re obtaining DNR/DNI.
1. Acute hypoxemic respiratory failure due to aspiration pneumonia. Continue current AC vent settings.  Pt  able to tolerate 8 PS today.  ETT secretions  minimal .. Continue Zosyn for 2-3 days. Zosyn. Trial of extubation  this am.  2, ID. UTI . E coli. Sacral decubitus ulcer without infection clinically. MRSA nasal swab negative.  3. Parkinson's disease. Continue sinemet.  4. DVT prophylaxis. Apixaban  5. GOC. Full code. Will discuss with guardian re obtaining DNR/DNI.
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 Court appointed guardian.    #AMS? - unclear from admitting notes; likely metabolic due to +UA and hypoxia/hypercapnea  - Head CT WNL  - TSH  #Acute Hypoxic Hypercarbic Respiratory Failure req intubation   - roby on IV Precedex, switched back to Propofol  -BNP> 3000 with evidence of volume overload on exam  - 2D echo with EF 55%, paradoxical septal wall motion abnormality due to conduction abnormality  - cont diurese - goal net negative 1L/24hrs  #Severe sepsis  - Septic W/U for Aspiration PNA, UA +ve UTI, and Stage IV Decubital Wound, Tmax 101.9  - Empiric ABx Coverage with Vanc/Zosyn, pending C& S   - if fever curve remains elevated would check RP/Kidney US  #DVT PPx: on Eliquis and SCD   #Ethics/Dispo: Full Code - family friend; had court appointed guardian    Bonnie Zamora MD
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 Court appointed guardian.    #AMS? - unclear from admitting notes; likely metabolic due to +UA and hypoxia/hypercapnea  - check Head CT  #Acute Hypoxic Hypercarbic Respiratory Failure req intubation   - Sedation on IV Fentanyl Gtt will add and switch with IV Precedex  -BNP> 3000 with evidence of volume overload on exam  - POCUS with decreased EF (~40%)  - diurese  - withdraw ETT 2cm, repeat CXR    #Severe sepsis  - Septic W/U for Aspiration PNA, UA +ve UTI, and Stage IV Decubital Wound   - Empiric ABx Coverage pending C& S   #DVT PPx with Lovenox and SCD   #Ethics/Dispo: Full Code - determining if family around; court appointed guardian    Bonnie Zamora MD
1. Acute hypoxemic respiratory failure due to aspiration pneumonia. Continue current AC vent settings. PS trial as tolerated. Still with large ETT secretions today.. Continue Zosyn.  2, ID. UTI and aspiration pneumonia. Also with sacral decubitus ulcer. Await culture results.  Wound care to evaluate ulcer. MRSA nasal swab pending.  3. Parkinson's disease. Continue sinemet.  4. Hold IV lasix  5. DVT prophylaxis. Apixaban  7. GOC. Full code. Will discuss with guardian re obtaining DNR/DN.

## 2022-07-15 NOTE — PROGRESS NOTE ADULT - SUBJECTIVE AND OBJECTIVE BOX
Patient is a 89y old  Female who presents with a chief complaint of Shortness of breath (15 Jul 2022 07:24)      INTERVAL HPI/OVERNIGHT EVENTS: No event. Patient tolerated CPAP very well. She was extubated this morning. She is stable. ICU team will monitor her overnight and if she tolerates extubation then she can be transferred to floor tomorrow. Her antibiotic was discontinued. She had low grade fever per ICU team, no abx needed. Monitor temperature.     Pain Location & Control: OK    MEDICATIONS  (STANDING):  ALBUTerol    90 MICROgram(s) HFA Inhaler      albuterol/ipratropium for Nebulization 3 milliLiter(s) Nebulizer every 6 hours  amLODIPine   Tablet 10 milliGRAM(s) Oral daily  apixaban 5 milliGRAM(s) Oral every 12 hours  ascorbic acid 500 milliGRAM(s) Oral daily  bethanechol 25 milliGRAM(s) Oral three times a day  carbidopa/levodopa  25/100 1 Tablet(s) Oral three times a day  chlorhexidine 4% Liquid 1 Application(s) Topical <User Schedule>  furosemide    Tablet 40 milliGRAM(s) Oral daily  levothyroxine 100 MICROGram(s) Oral daily  losartan 50 milliGRAM(s) Oral daily  multivitamin 1 Tablet(s) Oral daily    MEDICATIONS  (PRN):  acetaminophen     Tablet .. 650 milliGRAM(s) Oral every 6 hours PRN Temp greater or equal to 38C (100.4F), Mild Pain (1 - 3)      Allergies    No Known Allergies    Intolerances        REVIEW OF SYSTEMS:  CONSTITUTIONAL: No fever, weight loss, or fatigue  EYES: No eye pain, visual disturbances, or discharge  ENMT:  No difficulty hearing, tinnitus, vertigo; No sinus or throat pain  NECK: No pain or stiffness  BREASTS: No pain, masses, or nipple discharge  RESPIRATORY: No cough, wheezing, chills or hemoptysis; No shortness of breath  CARDIOVASCULAR: No chest pain, palpitations, dizziness, or leg swelling  GASTROINTESTINAL: No abdominal or epigastric pain. No nausea, vomiting, or hematemesis; No diarrhea or constipation. No melena or hematochezia.  GENITOURINARY: No dysuria, frequency, hematuria, or incontinence  NEUROLOGICAL: No headaches, memory loss, loss of strength, numbness, or tremors  SKIN: No itching, burning, rashes, or lesions   LYMPH NODES: No enlarged glands  ENDOCRINE: No heat or cold intolerance; No hair loss; No polydipsia or polyuria  MUSCULOSKELETAL: No back pain  PSYCHIATRIC: No depression, anxiety, mood swings, or difficulty sleeping  HEME/LYMPH: No easy bruising, or bleeding gums  ALLERGY AND IMMUNOLOGIC: No hives or eczema    Vital Signs Last 24 Hrs  T(C): 37.8 (15 Jul 2022 18:00), Max: 38 (15 Jul 2022 16:00)  T(F): 100 (15 Jul 2022 18:00), Max: 100.4 (15 Jul 2022 16:00)  HR: 78 (15 Jul 2022 18:00) (61 - 88)  BP: 152/62 (15 Jul 2022 18:00) (113/53 - 162/74)  BP(mean): 89 (15 Jul 2022 18:00) (76 - 107)  RR: 20 (15 Jul 2022 18:00) (14 - 34)  SpO2: 99% (15 Jul 2022 18:00) (94% - 100%)    Parameters below as of 15 Jul 2022 11:58  Patient On (Oxygen Delivery Method): nasal cannula        PHYSICAL EXAM:  GENERAL: NAD, well-groomed, well-developed  HEAD:  Atraumatic, Normocephalic  EYES: EOMI, PERRLA, conjunctiva and sclera clear  ENMT: No tonsillar erythema, exudates, or enlargement; Moist mucous membranes, Good dentition, No lesions  NECK: Supple, No JVD, Normal thyroid  NERVOUS SYSTEM:  Alert & Oriented X3, Good concentration; Motor Strength 5/5 B/L upper and lower extremities; DTRs 2+ intact and symmetric  CHEST/LUNG: Clear to auscultation bilaterally; No rales, rhonchi, wheezing, or rubs  HEART: Regular rate and rhythm; No murmurs, rubs, or gallops  ABDOMEN: Soft, Nontender, Nondistended; Bowel sounds present  EXTREMITIES:  2+ Peripheral Pulses, No clubbing or cyanosis  LYMPH: No lymphadenopathy noted  SKIN: No rashes or lesions      LABS:                        9.1    6.23  )-----------( 253      ( 15 Jul 2022 00:46 )             29.6     15 Jul 2022 00:46    150    |  107    |  30     ----------------------------<  146    3.9     |  33     |  0.71     Ca    8.7        15 Jul 2022 00:46  Phos  4.1       15 Jul 2022 00:46  Mg     2.6       15 Jul 2022 00:46    TPro  6.6    /  Alb  3.3    /  TBili  0.3    /  DBili  x      /  AST  7      /  ALT  <5     /  AlkPhos  57     15 Jul 2022 00:46    PT/INR - ( 15 Jul 2022 00:46 )   PT: 17.5 sec;   INR: 1.52 ratio         PTT - ( 15 Jul 2022 00:46 )  PTT:31.5 sec    CAPILLARY BLOOD GLUCOSE            Cultures  Culture Results:   No growth to date. (07-13-22 @ 00:29)  Culture Results:   No growth to date. (07-13-22 @ 00:29)  Culture Results:   No Growth Final (07-09-22 @ 02:09)  Culture Results:   >100,000 CFU/ml Escherichia coli (07-09-22 @ 01:37)  Culture Results:   No Growth Final (07-09-22 @ 00:30)      RADIOLOGY & ADDITIONAL TESTS:    Imaging Personally Reviewed:  [X ] YES  [ ] NO    Consultant(s) Notes Reviewed:  [X ] YES  [ ] NO    Care Discussed with Consultants/Other Providers [X ] YES  [ ] NO

## 2022-07-15 NOTE — PROGRESS NOTE ADULT - ASSESSMENT
89 year old female with PMH of Parkinson's disease, HTN, chronic DVT on Eliquis, and hypothyroidism who was admitted for acute hypoxic respiratory failure most likely 2/2 PNA, currently intubated and treated with zosyn.    Neuro:  - AOx1 on presentation, unknown baseline   - roby on precedex- d/maryam. Weaning off sedation today, will monitor ABG to see if ready for extubation   - unequal pupils on exam since admission  - CT head with no acute intracranial hemorrhage, hydrocephalus or extra-axial fluid collection. mild parenchymal volume loss and mild chronic microvascular ischemic changes    #Parkinson  -continue carbidopa/levodopa via OG tube     Cardiovascular:  #Heart-failure? / fluid overloaded  - BNP 3075 on 7/9  - echo with EF of 55% and mild aortic regurgitation,  paradoxical septal wall motion abnormality due to conduction abnormality  - on home 40 mg lasix   - monitor I/Os    #Chronic DVT  - CT head negative  - apixaban 5mg q12hrs    #Hypertension  - c/w home amlodipine     Pulmonary:  #Acute Hypoxic Respiratory Failure   - Intubated, Vent (A/C) Volume: 400 Rate: 24 FiO2: 21 PEEP: 5  - pt has been doing CPAP trials  - repeat ABG this morning, will evaluate for possible extubation today  - lowered rate and off sedation  - restarted duonebs after pt is having thick secretions    #Pneumonia  - Finished ceftriaxone yesterday. D/maryam Vancomycin  - restarted zosyn after pt spiked fever  - Consider bronchoscopy for mucus plug   - sputum cultures NGTD    #COPD?  -per guardian, pt does not have a hx of COPD and if she did smoke, it was many years ago   - Uses fluticasone and Duoneb at nursing home, will continue    GI/Nutrition:  - No active issues   - tube feeds via OG tube     Renal:  - Creatinine 0.87  - Monitor urine output, foster  - 40 IV lasix -today last day, will resume home 40 mg oral lasix tomorrow  - f/u Cr, CMP daily    ID:  -Septic W/U for Aspiration PNA, UA postitive UTI, and Stage IV Decubital Wound, Tmax 101.9  -pt febrile overnight, sent out blood cultures. If febrile again, will re-start zosyn.   - Vanc (7/9-7/11 ) Zosyn (7/9-7/11) Ceftriaxone (7/11-7/13) Zosyn (7/13-)  - d/maryam Vancomycin   - Nasal Swab for MRSA negative; staph aureus detected  - blood, urine, and sputum cultures NGTD  - if fever curve remains elevated would check RP/Kidney US    Heme:   #anemia  - Hemoglobin 9.6 (9.7) -admitted with 11.4, unclear baseline. No active bleeding   - CBC daily     Endocrine:  #Hypothyroidism  - will continue home med, Synthroid     Skin  #decubitus ulcer, stage 4   - wound care consult  -Sacral wound- Aquacel dressing QD  -BLE elevation  -Abx per MICU  -Moisturize intact skin w/ SWEEN cream BID  -Nutrition Consult for optimization in pt w/ Increased nutritional needs  -Continue turning and positioning w/ offloading assistive devices as per protocol      Ethics:  - FULL CODE pending clarification with guardian. Spoke to guardian on 7/11 after not being able to reach her- she stated that she does not have authority to make decisions on code status unless she goes to court with a doctor. She will try to reach out to nursing home to see if they ever filled out a MOLST form for the patient.   Guardianship paperwork obtained and in chart- palliative care team is aware.    DVT: Eliquis                  89 year old female with PMH of Parkinson's disease, HTN, chronic DVT on Eliquis, and hypothyroidism who was admitted for acute hypoxic respiratory failure most likely 2/2 PNA, currently intubated and treated with zosyn.    Neuro:  - AOx1 on presentation, unknown baseline   - roby on precedex- d/maryam. Pt off sedation today  - unequal pupils on exam since admission  - CT head with no acute intracranial hemorrhage, hydrocephalus or extra-axial fluid collection. mild parenchymal volume loss and mild chronic microvascular ischemic changes    #Parkinson  -continue carbidopa/levodopa via OG tube     Cardiovascular:  #Heart-failure? / fluid overloaded  - BNP 3075 on 7/9  - echo with EF of 55% and mild aortic regurgitation,  paradoxical septal wall motion abnormality due to conduction abnormality  - on home 40 mg lasix   -gave IV 40 lasix before extubating  - monitor I/Os    #Chronic DVT  - CT head negative  - apixaban 5mg q12hrs    #Hypertension  - c/w home amlodipine     Pulmonary:  #Acute Hypoxic Respiratory Failure   - Intubated, Vent (A/C) Volume: 400 Rate: 24 FiO2: 21 PEEP: 5  - pt extubated today 7/15  - repeat ABG this morning, will evaluate for possible extubation today  - lowered rate and off sedation  - restarted duonebs after pt is having thick secretions    #Pneumonia  - Finished ceftriaxone yesterday. D/maryam Vancomycin  - restarted zosyn after pt spiked fever  - Consider bronchoscopy for mucus plug   - sputum cultures NGTD    #COPD?  -per guardian, pt does not have a hx of COPD and if she did smoke, it was many years ago   - Uses fluticasone and Duoneb at nursing home, will continue    GI/Nutrition:  - No active issues   - tube feeds via OG tube     Renal:  - Creatinine 0.87  - Monitor urine output, foster  - 40 IV lasix -today last day, will resume home 40 mg oral lasix tomorrow  - f/u Cr, CMP daily    ID:  -Septic W/U for Aspiration PNA, UA postitive UTI, and Stage IV Decubital Wound, Tmax 101.9  -pt febrile overnight, sent out blood cultures. If febrile again, will re-start zosyn.   - Vanc (7/9-7/11 ) Zosyn (7/9-7/11) Ceftriaxone (7/11-7/13) Zosyn (7/13-)  - d/maryam Vancomycin   - Nasal Swab for MRSA negative; staph aureus detected  - blood, urine, and sputum cultures NGTD  - if fever curve remains elevated would check RP/Kidney US    Heme:   #anemia  - Hemoglobin 9.6 (9.7) -admitted with 11.4, unclear baseline. No active bleeding   - CBC daily     Endocrine:  #Hypothyroidism  - will continue home med, Synthroid     Skin  #decubitus ulcer, stage 4   - wound care consult  -Sacral wound- Aquacel dressing QD  -BLE elevation  -Abx per MICU  -Moisturize intact skin w/ SWEEN cream BID  -Nutrition Consult for optimization in pt w/ Increased nutritional needs  -Continue turning and positioning w/ offloading assistive devices as per protocol      Ethics:  - FULL CODE pending clarification with guardian. Spoke to guardian on 7/11 after not being able to reach her- she stated that she does not have authority to make decisions on code status unless she goes to court with a doctor. She will try to reach out to nursing home to see if they ever filled out a MOLST form for the patient.   Guardianship paperwork obtained and in chart- palliative care team is aware.    DVT: Eliquis

## 2022-07-16 LAB
ALBUMIN SERPL ELPH-MCNC: 3.6 G/DL — SIGNIFICANT CHANGE UP (ref 3.3–5)
ALP SERPL-CCNC: 57 U/L — SIGNIFICANT CHANGE UP (ref 40–120)
ALT FLD-CCNC: <5 U/L — LOW (ref 10–45)
ANION GAP SERPL CALC-SCNC: 16 MMOL/L — SIGNIFICANT CHANGE UP (ref 5–17)
AST SERPL-CCNC: 17 U/L — SIGNIFICANT CHANGE UP (ref 10–40)
BILIRUB SERPL-MCNC: 0.4 MG/DL — SIGNIFICANT CHANGE UP (ref 0.2–1.2)
BUN SERPL-MCNC: 27 MG/DL — HIGH (ref 7–23)
CALCIUM SERPL-MCNC: 9.2 MG/DL — SIGNIFICANT CHANGE UP (ref 8.4–10.5)
CHLORIDE SERPL-SCNC: 105 MMOL/L — SIGNIFICANT CHANGE UP (ref 96–108)
CO2 SERPL-SCNC: 28 MMOL/L — SIGNIFICANT CHANGE UP (ref 22–31)
CREAT SERPL-MCNC: 0.73 MG/DL — SIGNIFICANT CHANGE UP (ref 0.5–1.3)
EGFR: 78 ML/MIN/1.73M2 — SIGNIFICANT CHANGE UP
GLUCOSE SERPL-MCNC: 126 MG/DL — HIGH (ref 70–99)
HCT VFR BLD CALC: 35.4 % — SIGNIFICANT CHANGE UP (ref 34.5–45)
HGB BLD-MCNC: 10.5 G/DL — LOW (ref 11.5–15.5)
MAGNESIUM SERPL-MCNC: 2.5 MG/DL — SIGNIFICANT CHANGE UP (ref 1.6–2.6)
MCHC RBC-ENTMCNC: 29.6 PG — SIGNIFICANT CHANGE UP (ref 27–34)
MCHC RBC-ENTMCNC: 29.7 GM/DL — LOW (ref 32–36)
MCV RBC AUTO: 99.7 FL — SIGNIFICANT CHANGE UP (ref 80–100)
NRBC # BLD: 0 /100 WBCS — SIGNIFICANT CHANGE UP (ref 0–0)
PHOSPHATE SERPL-MCNC: 4.1 MG/DL — SIGNIFICANT CHANGE UP (ref 2.5–4.5)
PLATELET # BLD AUTO: 283 K/UL — SIGNIFICANT CHANGE UP (ref 150–400)
POTASSIUM SERPL-MCNC: 3.4 MMOL/L — LOW (ref 3.5–5.3)
POTASSIUM SERPL-SCNC: 3.4 MMOL/L — LOW (ref 3.5–5.3)
PROT SERPL-MCNC: 7.5 G/DL — SIGNIFICANT CHANGE UP (ref 6–8.3)
RBC # BLD: 3.55 M/UL — LOW (ref 3.8–5.2)
RBC # FLD: 14.9 % — HIGH (ref 10.3–14.5)
SODIUM SERPL-SCNC: 149 MMOL/L — HIGH (ref 135–145)
WBC # BLD: 6.87 K/UL — SIGNIFICANT CHANGE UP (ref 3.8–10.5)
WBC # FLD AUTO: 6.87 K/UL — SIGNIFICANT CHANGE UP (ref 3.8–10.5)

## 2022-07-16 PROCEDURE — 93306 TTE W/DOPPLER COMPLETE: CPT | Mod: 26

## 2022-07-16 RX ADMIN — CARBIDOPA AND LEVODOPA 1 TABLET(S): 25; 100 TABLET ORAL at 21:11

## 2022-07-16 RX ADMIN — Medication 40 MILLIGRAM(S): at 05:47

## 2022-07-16 RX ADMIN — Medication 1 TABLET(S): at 11:59

## 2022-07-16 RX ADMIN — Medication 3 MILLILITER(S): at 00:51

## 2022-07-16 RX ADMIN — APIXABAN 5 MILLIGRAM(S): 2.5 TABLET, FILM COATED ORAL at 05:47

## 2022-07-16 RX ADMIN — Medication 3 MILLIGRAM(S): at 21:11

## 2022-07-16 RX ADMIN — APIXABAN 5 MILLIGRAM(S): 2.5 TABLET, FILM COATED ORAL at 17:04

## 2022-07-16 RX ADMIN — LOSARTAN POTASSIUM 50 MILLIGRAM(S): 100 TABLET, FILM COATED ORAL at 05:47

## 2022-07-16 RX ADMIN — Medication 3 MILLILITER(S): at 11:59

## 2022-07-16 RX ADMIN — CARBIDOPA AND LEVODOPA 1 TABLET(S): 25; 100 TABLET ORAL at 12:01

## 2022-07-16 RX ADMIN — Medication 3 MILLILITER(S): at 21:11

## 2022-07-16 RX ADMIN — CARBIDOPA AND LEVODOPA 1 TABLET(S): 25; 100 TABLET ORAL at 05:46

## 2022-07-16 RX ADMIN — Medication 500 MILLIGRAM(S): at 12:00

## 2022-07-16 RX ADMIN — Medication 25 MILLIGRAM(S): at 12:01

## 2022-07-16 RX ADMIN — AMLODIPINE BESYLATE 10 MILLIGRAM(S): 2.5 TABLET ORAL at 05:47

## 2022-07-16 RX ADMIN — Medication 25 MILLIGRAM(S): at 05:47

## 2022-07-16 RX ADMIN — Medication 25 MILLIGRAM(S): at 21:11

## 2022-07-16 RX ADMIN — Medication 100 MICROGRAM(S): at 05:47

## 2022-07-16 RX ADMIN — Medication 3 MILLILITER(S): at 17:06

## 2022-07-16 RX ADMIN — Medication 3 MILLILITER(S): at 05:46

## 2022-07-16 NOTE — OCCUPATIONAL THERAPY INITIAL EVALUATION ADULT - LIVES WITH, PROFILE
Pt unable to provide history. Hx obtained per chart review. Pt is resident at [facility] facility, bedbound prior to admission, total assist/dependent for all ADLs, functional mobility. Mechanical lift device for all transfers

## 2022-07-16 NOTE — PROGRESS NOTE ADULT - SUBJECTIVE AND OBJECTIVE BOX
Patient is a 89y old  Female who presents with a chief complaint of Shortness of breath (15 Jul 2022 18:17)      INTERVAL HPI/OVERNIGHT EVENTS: No event. S/p extubation yesterday. Tolerating RA well, oxygenation around 91%. No SOB, no fever. Patient transferred to medical floor since last night. Continue to monitor her respiratory status. Possible  discharge on Monday. Patient is off of antibiotics.     Pain Location & Control: OK    MEDICATIONS  (STANDING):  ALBUTerol    90 MICROgram(s) HFA Inhaler      albuterol/ipratropium for Nebulization 3 milliLiter(s) Nebulizer every 6 hours  amLODIPine   Tablet 10 milliGRAM(s) Oral daily  apixaban 5 milliGRAM(s) Oral every 12 hours  ascorbic acid 500 milliGRAM(s) Oral daily  bethanechol 25 milliGRAM(s) Oral three times a day  carbidopa/levodopa  25/100 1 Tablet(s) Oral three times a day  chlorhexidine 4% Liquid 1 Application(s) Topical <User Schedule>  furosemide    Tablet 40 milliGRAM(s) Oral daily  levothyroxine 100 MICROGram(s) Oral daily  losartan 50 milliGRAM(s) Oral daily  metolazone 5 milliGRAM(s) Oral daily  multivitamin 1 Tablet(s) Oral daily    MEDICATIONS  (PRN):  acetaminophen     Tablet .. 650 milliGRAM(s) Oral every 6 hours PRN Temp greater or equal to 38C (100.4F), Mild Pain (1 - 3)  melatonin 3 milliGRAM(s) Oral at bedtime PRN Insomnia      Allergies    No Known Allergies    Intolerances        REVIEW OF SYSTEMS:  CONSTITUTIONAL: No fever, weight loss, or fatigue  EYES: No eye pain, visual disturbances, or discharge  ENMT:  No difficulty hearing, tinnitus, vertigo; No sinus or throat pain  NECK: No pain or stiffness  BREASTS: No pain, masses, or nipple discharge  RESPIRATORY: No cough, wheezing, chills or hemoptysis; No shortness of breath  CARDIOVASCULAR: No chest pain, palpitations, dizziness, or leg swelling  GASTROINTESTINAL: No abdominal or epigastric pain. No nausea, vomiting, or hematemesis; No diarrhea or constipation. No melena or hematochezia.  GENITOURINARY: No dysuria, frequency, hematuria, or incontinence  NEUROLOGICAL: No headaches, memory loss, loss of strength, numbness, or tremors  SKIN: No itching, burning, rashes, or lesions   LYMPH NODES: No enlarged glands  ENDOCRINE: No heat or cold intolerance; No hair loss; No polydipsia or polyuria  MUSCULOSKELETAL: No back pain  PSYCHIATRIC: No depression, anxiety, mood swings, or difficulty sleeping  HEME/LYMPH: No easy bruising, or bleeding gums  ALLERGY AND IMMUNOLOGIC: No hives or eczema    Vital Signs Last 24 Hrs  T(C): 38.2 (16 Jul 2022 16:46), Max: 38.2 (16 Jul 2022 16:46)  T(F): 100.7 (16 Jul 2022 16:46), Max: 100.7 (16 Jul 2022 16:46)  HR: 70 (16 Jul 2022 16:46) (61 - 77)  BP: 145/69 (16 Jul 2022 16:46) (121/84 - 149/67)  BP(mean): 90 (15 Jul 2022 23:10) (88 - 99)  RR: 19 (16 Jul 2022 16:46) (16 - 26)  SpO2: 92% (16 Jul 2022 16:46) (92% - 98%)    Parameters below as of 16 Jul 2022 16:46  Patient On (Oxygen Delivery Method): room air        PHYSICAL EXAM:  GENERAL: NAD, well-groomed, well-developed,  HEAD:  Atraumatic, Normocephalic  EYES: EOMI, PERRLA, conjunctiva and sclera clear  ENMT: No tonsillar erythema, exudates, or enlargement; Moist mucous membranes, Good dentition, No lesions  NECK: Supple, No JVD, Normal thyroid  NERVOUS SYSTEM:  Alert & Oriented x 1, awake and responsive Good concentration; Motor Strength 5/5 B/L upper and lower extremities; DTRs 2+ intact and symmetric  CHEST/LUNG: Clear to auscultation bilaterally; No rales, rhonchi, wheezing, or rubs  HEART: Regular rate and rhythm; No murmurs, rubs, or gallops  ABDOMEN: Soft, Nontender, Nondistended; Bowel sounds present  EXTREMITIES:  2+ Peripheral Pulses, No clubbing or cyanosis, b/l hand contracture, LE contracture chronic.   LYMPH: No lymphadenopathy noted  SKIN: No rashes or lesions    LABS:                        10.5   6.87  )-----------( 283      ( 16 Jul 2022 10:29 )             35.4     16 Jul 2022 10:31    149    |  105    |  27     ----------------------------<  126    3.4     |  28     |  0.73     Ca    9.2        16 Jul 2022 10:31  Phos  4.1       16 Jul 2022 10:31  Mg     2.5       16 Jul 2022 10:31    TPro  7.5    /  Alb  3.6    /  TBili  0.4    /  DBili  x      /  AST  17     /  ALT  <5     /  AlkPhos  57     16 Jul 2022 10:31    PT/INR - ( 15 Jul 2022 00:46 )   PT: 17.5 sec;   INR: 1.52 ratio         PTT - ( 15 Jul 2022 00:46 )  PTT:31.5 sec    CAPILLARY BLOOD GLUCOSE            Cultures  Culture Results:   No growth to date. (07-13-22 @ 00:29)  Culture Results:   No growth to date. (07-13-22 @ 00:29)      RADIOLOGY & ADDITIONAL TESTS:    Imaging Personally Reviewed:  [X ] YES  [ ] NO    Consultant(s) Notes Reviewed:  [ X] YES  [ ] NO    Care Discussed with Consultants/Other Providers [X ] YES  [ ] NO

## 2022-07-16 NOTE — OCCUPATIONAL THERAPY INITIAL EVALUATION ADULT - GENERAL OBSERVATIONS, REHAB EVAL
Pt received supine in bed, A+Ox1 (name only), external female catheter, contractures x all 4 extremities, bilateral CAIR boots donned, PIV

## 2022-07-16 NOTE — OCCUPATIONAL THERAPY INITIAL EVALUATION ADULT - ANTICIPATED DISCHARGE DISPOSITION, OT EVAL
Recommend return to Long Term Care facility. Patient appears to be at baseline functional status, no acute OT services required at this time./no needs/extended care

## 2022-07-16 NOTE — PROGRESS NOTE ADULT - ASSESSMENT
89-Year-old Female PMH Dementia, Parkinson's Disease, Dysphagia, DVT, Muscle Weakness, Gait Instability, Pressure Ulcer, DVT, HTN, HYPOTHYROIDISM, DEPRESSION, PARKINSONS, MOOD DISORDER, DEMENTIA, DECUBITUS ULCERS, SEPSIS, REFLUX, INSOMNIA, CONSTIPATION, HLD who was found hypoxic in SNF last night, she was treated with one dose of IM lasix and IV Zosyn, O2 initially improved but then dropped again to 80s with respiratory distress. She was transferred to [facility] for more evaluation due to acute respiratory failure. She was found with Co2 retention and acidosis. She was started on BIPAP and IV broad abx therapy and was admitted in MICU.     Acute hypercapnic / hypoxic respiratory failure - s/p intubation on AC, sedated with Fentanyl transition to Precedex  f/u with icu as primary team.  Patient extubated today 7/15/22, tolerating well on RA. Continue to monitor.   Aspiration pneumonia -resolved . Off of   antibiotics.   HF - ? with elevated proBNP, on lasix 40 mg IV, Echo with normal EF.   palliative - consult palliative care. Patient is still full code.   depression/anxiety- at home was on  Escitalopram 10 mg. F/u psych   weight loss- improved on puree diet with supplementary diet   sacral wound stage 4- Continue Collagen topical powder and calcium alginate with zinc oxide to periwound. F/u wound care team. Offload pressure.  HTN- home meds as Losartan and Amlodipine 10 mg   HLD - home meds as Simvastatin 20 mg on hold   urinary retention - home meds as  urecholine TID on hold   DVT ppx- Eliquis 5 mg BID  Parkinson's - Sinemet 25/100 mg TID  Hypothyroidism- Synthroid to 100 mcg.

## 2022-07-17 LAB
ANION GAP SERPL CALC-SCNC: 19 MMOL/L — HIGH (ref 5–17)
BUN SERPL-MCNC: 33 MG/DL — HIGH (ref 7–23)
CALCIUM SERPL-MCNC: 9.2 MG/DL — SIGNIFICANT CHANGE UP (ref 8.4–10.5)
CHLORIDE SERPL-SCNC: 106 MMOL/L — SIGNIFICANT CHANGE UP (ref 96–108)
CO2 SERPL-SCNC: 23 MMOL/L — SIGNIFICANT CHANGE UP (ref 22–31)
CREAT SERPL-MCNC: 0.81 MG/DL — SIGNIFICANT CHANGE UP (ref 0.5–1.3)
EGFR: 68 ML/MIN/1.73M2 — SIGNIFICANT CHANGE UP
GLUCOSE SERPL-MCNC: 96 MG/DL — SIGNIFICANT CHANGE UP (ref 70–99)
HCT VFR BLD CALC: 35.8 % — SIGNIFICANT CHANGE UP (ref 34.5–45)
HGB BLD-MCNC: 10.5 G/DL — LOW (ref 11.5–15.5)
MCHC RBC-ENTMCNC: 29.3 GM/DL — LOW (ref 32–36)
MCHC RBC-ENTMCNC: 29.7 PG — SIGNIFICANT CHANGE UP (ref 27–34)
MCV RBC AUTO: 101.4 FL — HIGH (ref 80–100)
NRBC # BLD: 0 /100 WBCS — SIGNIFICANT CHANGE UP (ref 0–0)
PLATELET # BLD AUTO: 223 K/UL — SIGNIFICANT CHANGE UP (ref 150–400)
POTASSIUM SERPL-MCNC: 4.3 MMOL/L — SIGNIFICANT CHANGE UP (ref 3.5–5.3)
POTASSIUM SERPL-SCNC: 4.3 MMOL/L — SIGNIFICANT CHANGE UP (ref 3.5–5.3)
RBC # BLD: 3.53 M/UL — LOW (ref 3.8–5.2)
RBC # FLD: 15 % — HIGH (ref 10.3–14.5)
SODIUM SERPL-SCNC: 148 MMOL/L — HIGH (ref 135–145)
WBC # BLD: 6.53 K/UL — SIGNIFICANT CHANGE UP (ref 3.8–10.5)
WBC # FLD AUTO: 6.53 K/UL — SIGNIFICANT CHANGE UP (ref 3.8–10.5)

## 2022-07-17 RX ADMIN — APIXABAN 5 MILLIGRAM(S): 2.5 TABLET, FILM COATED ORAL at 05:25

## 2022-07-17 RX ADMIN — Medication 25 MILLIGRAM(S): at 11:42

## 2022-07-17 RX ADMIN — CARBIDOPA AND LEVODOPA 1 TABLET(S): 25; 100 TABLET ORAL at 11:41

## 2022-07-17 RX ADMIN — Medication 25 MILLIGRAM(S): at 05:26

## 2022-07-17 RX ADMIN — CARBIDOPA AND LEVODOPA 1 TABLET(S): 25; 100 TABLET ORAL at 05:23

## 2022-07-17 RX ADMIN — CARBIDOPA AND LEVODOPA 1 TABLET(S): 25; 100 TABLET ORAL at 21:21

## 2022-07-17 RX ADMIN — Medication 3 MILLILITER(S): at 05:29

## 2022-07-17 RX ADMIN — Medication 500 MILLIGRAM(S): at 11:41

## 2022-07-17 RX ADMIN — Medication 3 MILLIGRAM(S): at 21:21

## 2022-07-17 RX ADMIN — Medication 3 MILLILITER(S): at 17:05

## 2022-07-17 RX ADMIN — Medication 100 MICROGRAM(S): at 05:23

## 2022-07-17 RX ADMIN — CHLORHEXIDINE GLUCONATE 1 APPLICATION(S): 213 SOLUTION TOPICAL at 05:31

## 2022-07-17 RX ADMIN — Medication 1 TABLET(S): at 11:41

## 2022-07-17 RX ADMIN — Medication 3 MILLILITER(S): at 11:41

## 2022-07-17 RX ADMIN — AMLODIPINE BESYLATE 10 MILLIGRAM(S): 2.5 TABLET ORAL at 05:24

## 2022-07-17 RX ADMIN — LOSARTAN POTASSIUM 50 MILLIGRAM(S): 100 TABLET, FILM COATED ORAL at 05:24

## 2022-07-17 RX ADMIN — Medication 25 MILLIGRAM(S): at 21:22

## 2022-07-17 RX ADMIN — Medication 3 MILLILITER(S): at 21:22

## 2022-07-17 RX ADMIN — Medication 40 MILLIGRAM(S): at 05:23

## 2022-07-17 RX ADMIN — APIXABAN 5 MILLIGRAM(S): 2.5 TABLET, FILM COATED ORAL at 17:05

## 2022-07-17 NOTE — SWALLOW BEDSIDE ASSESSMENT ADULT - SLP PERTINENT HISTORY OF CURRENT PROBLEM
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 Limited history, obtained through paperwork from nursing home. Patient was suspected to have bilateral pneumonia based on chest X-ray at nursing home yesterday 7/8. On initial presentation patient was afebrile, had non labored breathing on 6L nasal canula, and was AOx1. In the ED, patient was found to be hypercapnic with a PCO2 of 104 and pH of 7.19 off a VBG. Patient was placed on avaps; however she continued to be hypercapnic and acidotic with repeat VBG showing PCO2 of 117 and pH of 7.09. At this time, patient was intubated. Started Zosyn for potential aspiration pneumonia and uti (+U/A) 89 year old female with PMH of Parkinson's disease, Dementia, HTN, chronic DVT on Eliquis, and hypothyroidism who was brought in by EMS for shortness of breath. Limited history, obtained through paperwork from nursing home. Patient was suspected to have bilateral pneumonia based on chest X-ray at nursing home yesterday 7/8. On initial presentation patient was afebrile, had non labored breathing on 6L nasal canula, and was AOx1. In the ED, patient was found to be hypercapnic with a PCO2 of 104 and pH of 7.19 off a VBG. Patient was placed on avaps; however she continued to be hypercapnic and acidotic with repeat VBG showing PCO2 of 117 and pH of 7.09. At this time, patient was intubated. Started Zosyn for potential aspiration pneumonia and uti (+U/A)

## 2022-07-17 NOTE — PROGRESS NOTE ADULT - SUBJECTIVE AND OBJECTIVE BOX
Patient is a 89y old  Female who presents with a chief complaint of Shortness of breath (16 Jul 2022 18:22)    Date of servie : 07-17-22 @ 21:02  INTERVAL HPI/OVERNIGHT EVENTS:  T(C): 37.1 (07-17-22 @ 20:18), Max: 37.5 (07-17-22 @ 11:39)  HR: 67 (07-17-22 @ 20:18) (61 - 72)  BP: 109/61 (07-17-22 @ 20:18) (107/54 - 144/67)  RR: 18 (07-17-22 @ 20:18) (18 - 18)  SpO2: 92% (07-17-22 @ 20:18) (89% - 95%)  Wt(kg): --  I&O's Summary    16 Jul 2022 07:01  -  17 Jul 2022 07:00  --------------------------------------------------------  IN: 1200 mL / OUT: 950 mL / NET: 250 mL    17 Jul 2022 07:01  -  17 Jul 2022 21:02  --------------------------------------------------------  IN: 1020 mL / OUT: 550 mL / NET: 470 mL        LABS:                        10.5   6.53  )-----------( 223      ( 17 Jul 2022 07:45 )             35.8     07-17    148<H>  |  106  |  33<H>  ----------------------------<  96  4.3   |  23  |  0.81    Ca    9.2      17 Jul 2022 07:29  Phos  4.1     07-16  Mg     2.5     07-16    TPro  7.5  /  Alb  3.6  /  TBili  0.4  /  DBili  x   /  AST  17  /  ALT  <5<L>  /  AlkPhos  57  07-16        CAPILLARY BLOOD GLUCOSE                MEDICATIONS  (STANDING):  ALBUTerol    90 MICROgram(s) HFA Inhaler      albuterol/ipratropium for Nebulization 3 milliLiter(s) Nebulizer every 6 hours  amLODIPine   Tablet 10 milliGRAM(s) Oral daily  apixaban 5 milliGRAM(s) Oral every 12 hours  ascorbic acid 500 milliGRAM(s) Oral daily  bethanechol 25 milliGRAM(s) Oral three times a day  carbidopa/levodopa  25/100 1 Tablet(s) Oral three times a day  chlorhexidine 4% Liquid 1 Application(s) Topical <User Schedule>  furosemide    Tablet 40 milliGRAM(s) Oral daily  levothyroxine 100 MICROGram(s) Oral daily  losartan 50 milliGRAM(s) Oral daily  metolazone 5 milliGRAM(s) Oral daily  multivitamin 1 Tablet(s) Oral daily    MEDICATIONS  (PRN):  acetaminophen     Tablet .. 650 milliGRAM(s) Oral every 6 hours PRN Temp greater or equal to 38C (100.4F), Mild Pain (1 - 3)  melatonin 3 milliGRAM(s) Oral at bedtime PRN Insomnia          PHYSICAL EXAM:  GENERAL: NAD, well-groomed, well-developed  HEAD:  Atraumatic, Normocephalic  CHEST/LUNG: Clear to percussion bilaterally; No rales, rhonchi, wheezing, or rubs  HEART: Regular rate and rhythm; No murmurs, rubs, or gallops  ABDOMEN: Soft, Nontender, Nondistended; Bowel sounds present  EXTREMITIES:  2+ Peripheral Pulses, No clubbing, cyanosis, or edema  LYMPH: No lymphadenopathy noted  SKIN: No rashes or lesions    Care Discussed with Consultants/Other Providers [ ] YES  [ ] NO

## 2022-07-17 NOTE — SWALLOW BEDSIDE ASSESSMENT ADULT - SLP GENERAL OBSERVATIONS
Pt encountered bedside, AA&Ox1(self). +2LO2NC ordered, however, pt repeatedly removing NC with SPO2 93% on RA. +Hoarse vocal quality at baseline, however, clear/dry. No evidence of oral secretions. Pt intermittently able to follow 1-step directives only; unable to consistently answer simple yes/no questions, however, aware of residence at [facility]. Pt repeatedly asked similar off-topic questions, consistent with PMHx of dementia. Pt endorses a puree diet at LTC and unable to state type of liquid.

## 2022-07-17 NOTE — SWALLOW BEDSIDE ASSESSMENT ADULT - COMMENTS
7/9: Intubated   7/13: MICU Resident - Still with large ETT secretions today.. Re start Zosyn for 2-3 days. Zosyn.   Vanc (7/9-7/11 ) Zosyn (7/9-7/11) Ceftriaxone (7/11-7/13) Zosyn (7/13-)  7/15: Extubated.  Restarted duonebs after pt is having thick secretions.    Care coordination:  Discharge plan is to return to LTC @ [facility]. Pt has a legal court appointed guardian,  Evaristo Reddy ([phone]).. Discharge plan is to return to [facility]  7/16: Internal medicine - Tolerating RA well, oxygenation around 91%. No SOB, no fever.    No known speech history Pt speaking with PO in oral cavity; requires verbal cues to cease speaking and for slow rate/small bolus size

## 2022-07-17 NOTE — SWALLOW BEDSIDE ASSESSMENT ADULT - SWALLOW EVAL: DIAGNOSIS
89 year old female with PMH of Parkinson's disease, HTN, chronic DVT on Eliquis, and hypothyroidism  who was admitted for acute hypoxic respiratory failure most likely due to pneumonia. Intubated in the ED. 89 year old female with PMH of Parkinson's disease, HTN, chronic DVT on Eliquis, and hypothyroidism  who was admitted for acute hypoxic respiratory failure most likely due to pneumonia. Intubated in the ED 7/9 & extubated 7/15. Pt presents with clinical signs of an oropharyngeal dysphagia, superimposed upon cognitive deficits. Rapid intake at times, requiring reduced bolus size/rate 2/2 impulsivity. Fair bolus manipulation and transport of bolus. Mild delay in swallow initiation. +Slight wet vocal quality post rapid intake of mildly thickened liquids with speaking prior to swallow initiation.  No overt signs or symptoms of penetration or aspiration on purees and moderately thickened liquids. Strict pacing and small bolus size provided by SLP.

## 2022-07-17 NOTE — SWALLOW BEDSIDE ASSESSMENT ADULT - ADDITIONAL RECOMMENDATIONS
Monitor for s/s aspiration/laryngeal penetration. If noted:  D/C p.o. intake, provide non-oral nutrition/hydration/meds, and contact this service @ [phone]  Maintain good oral hygiene    LTG: Pt will tolerate least restrictive diet without overt signs or symptoms of penetration or aspiration

## 2022-07-17 NOTE — PROGRESS NOTE ADULT - ASSESSMENT
89-Year-old Female PMH Dementia, Parkinson's Disease, Dysphagia, DVT, Muscle Weakness, Gait Instability, Pressure Ulcer, DVT, HTN, HYPOTHYROIDISM, DEPRESSION, PARKINSONS, MOOD DISORDER, DEMENTIA, DECUBITUS ULCERS, SEPSIS, REFLUX, INSOMNIA, CONSTIPATION, HLD who was found hypoxic in SNF last night, she was treated with one dose of IM lasix and IV Zosyn, O2 initially improved but then dropped again to 80s with respiratory distress. She was transferred to [facility] for more evaluation due to acute respiratory failure. She was found with Co2 retention and acidosis. She was started on BIPAP and IV broad abx therapy and was admitted in MICU.     Acute hypercapnic / hypoxic respiratory failure - s/p intubation on AC, sedated with Fentanyl transition to Precedex  f/u with icu as primary team.  Patient extubated today 7/15/22, tolerating well on RA. Continue to monitor.   Aspiration pneumonia -resolved . Off of   antibiotics.   HF - ? with elevated proBNP, on lasix 40 mg IV, Echo with normal EF.   palliative - consult palliative care. Patient is still full code.   depression/anxiety- at home was on  Escitalopram 10 mg. F/u psych   weight loss- improved on puree diet with supplementary diet   sacral wound stage 4- Continue Collagen topical powder and calcium alginate with zinc oxide to periwound. F/u wound care team. Offload pressure.  HTN- home meds as Losartan and Amlodipine 10 mg   HLD - home meds as Simvastatin 20 mg on hold   urinary retention - home meds as  urecholine TID on hold   DVT ppx- Eliquis 5 mg BID  Parkinson's - Sinemet 25/100 mg TID  Hypothyroidism- Synthroid to 100 mcg.

## 2022-07-17 NOTE — SWALLOW BEDSIDE ASSESSMENT ADULT - SWALLOW EVAL: RECOMMENDED DIET
1) Puree and moderately thickened liquids 2) 1:1 assist for all PO intake 3) Crush medications and place in applesauce when feasible

## 2022-07-18 LAB
ANION GAP SERPL CALC-SCNC: 15 MMOL/L — SIGNIFICANT CHANGE UP (ref 5–17)
BUN SERPL-MCNC: 36 MG/DL — HIGH (ref 7–23)
CALCIUM SERPL-MCNC: 9.3 MG/DL — SIGNIFICANT CHANGE UP (ref 8.4–10.5)
CHLORIDE SERPL-SCNC: 104 MMOL/L — SIGNIFICANT CHANGE UP (ref 96–108)
CO2 SERPL-SCNC: 30 MMOL/L — SIGNIFICANT CHANGE UP (ref 22–31)
CREAT SERPL-MCNC: 1.14 MG/DL — SIGNIFICANT CHANGE UP (ref 0.5–1.3)
CULTURE RESULTS: SIGNIFICANT CHANGE UP
CULTURE RESULTS: SIGNIFICANT CHANGE UP
EGFR: 45 ML/MIN/1.73M2 — LOW
GLUCOSE SERPL-MCNC: 89 MG/DL — SIGNIFICANT CHANGE UP (ref 70–99)
HCT VFR BLD CALC: 37.9 % — SIGNIFICANT CHANGE UP (ref 34.5–45)
HGB BLD-MCNC: 11.6 G/DL — SIGNIFICANT CHANGE UP (ref 11.5–15.5)
MCHC RBC-ENTMCNC: 29.4 PG — SIGNIFICANT CHANGE UP (ref 27–34)
MCHC RBC-ENTMCNC: 30.6 GM/DL — LOW (ref 32–36)
MCV RBC AUTO: 96.2 FL — SIGNIFICANT CHANGE UP (ref 80–100)
NRBC # BLD: 0 /100 WBCS — SIGNIFICANT CHANGE UP (ref 0–0)
PLATELET # BLD AUTO: 337 K/UL — SIGNIFICANT CHANGE UP (ref 150–400)
POTASSIUM SERPL-MCNC: 3.5 MMOL/L — SIGNIFICANT CHANGE UP (ref 3.5–5.3)
POTASSIUM SERPL-SCNC: 3.5 MMOL/L — SIGNIFICANT CHANGE UP (ref 3.5–5.3)
RBC # BLD: 3.94 M/UL — SIGNIFICANT CHANGE UP (ref 3.8–5.2)
RBC # FLD: 14.8 % — HIGH (ref 10.3–14.5)
SODIUM SERPL-SCNC: 149 MMOL/L — HIGH (ref 135–145)
SPECIMEN SOURCE: SIGNIFICANT CHANGE UP
SPECIMEN SOURCE: SIGNIFICANT CHANGE UP
WBC # BLD: 7.36 K/UL — SIGNIFICANT CHANGE UP (ref 3.8–10.5)
WBC # FLD AUTO: 7.36 K/UL — SIGNIFICANT CHANGE UP (ref 3.8–10.5)

## 2022-07-18 PROCEDURE — 99232 SBSQ HOSP IP/OBS MODERATE 35: CPT | Mod: FS

## 2022-07-18 RX ORDER — SODIUM CHLORIDE 9 MG/ML
1000 INJECTION, SOLUTION INTRAVENOUS
Refills: 0 | Status: DISCONTINUED | OUTPATIENT
Start: 2022-07-18 | End: 2022-07-19

## 2022-07-18 RX ADMIN — Medication 40 MILLIGRAM(S): at 05:21

## 2022-07-18 RX ADMIN — Medication 1 TABLET(S): at 12:01

## 2022-07-18 RX ADMIN — LOSARTAN POTASSIUM 50 MILLIGRAM(S): 100 TABLET, FILM COATED ORAL at 05:22

## 2022-07-18 RX ADMIN — AMLODIPINE BESYLATE 10 MILLIGRAM(S): 2.5 TABLET ORAL at 05:21

## 2022-07-18 RX ADMIN — Medication 25 MILLIGRAM(S): at 12:01

## 2022-07-18 RX ADMIN — CARBIDOPA AND LEVODOPA 1 TABLET(S): 25; 100 TABLET ORAL at 21:25

## 2022-07-18 RX ADMIN — Medication 3 MILLILITER(S): at 23:24

## 2022-07-18 RX ADMIN — Medication 3 MILLILITER(S): at 12:01

## 2022-07-18 RX ADMIN — SODIUM CHLORIDE 50 MILLILITER(S): 9 INJECTION, SOLUTION INTRAVENOUS at 16:11

## 2022-07-18 RX ADMIN — Medication 100 MICROGRAM(S): at 05:21

## 2022-07-18 RX ADMIN — APIXABAN 5 MILLIGRAM(S): 2.5 TABLET, FILM COATED ORAL at 17:55

## 2022-07-18 RX ADMIN — CARBIDOPA AND LEVODOPA 1 TABLET(S): 25; 100 TABLET ORAL at 05:23

## 2022-07-18 RX ADMIN — Medication 500 MILLIGRAM(S): at 12:01

## 2022-07-18 RX ADMIN — CHLORHEXIDINE GLUCONATE 1 APPLICATION(S): 213 SOLUTION TOPICAL at 05:23

## 2022-07-18 RX ADMIN — Medication 25 MILLIGRAM(S): at 05:20

## 2022-07-18 RX ADMIN — APIXABAN 5 MILLIGRAM(S): 2.5 TABLET, FILM COATED ORAL at 05:22

## 2022-07-18 RX ADMIN — Medication 3 MILLILITER(S): at 05:20

## 2022-07-18 RX ADMIN — Medication 3 MILLILITER(S): at 17:56

## 2022-07-18 RX ADMIN — Medication 25 MILLIGRAM(S): at 21:25

## 2022-07-18 RX ADMIN — CARBIDOPA AND LEVODOPA 1 TABLET(S): 25; 100 TABLET ORAL at 12:02

## 2022-07-18 NOTE — CONSULT NOTE ADULT - ASSESSMENT
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 Limited history, obtained through paperwork from nursing home. Patient was suspected to have bilateral pneumonia based on chest X-ray at nursing home yesterday 7/8. On initial presentation patient was afebrile, had non labored breathing on 6L nasal canula, and was AOx1. In the ED, patient was found to be hypercapnic with a PCO2 of 104 and pH of 7.19 off a VBG. Patient was placed on avaps; however she continued to be hypercapnic and acidotic with repeat VBG showing PCO2 of 117 and pH of 7.09. At this time, patient was intubated.      hypernatremia  increase water start d5w 50 cc per hr   will check ua , urine osmolality , urine sodium , urine uric acid , serum sodium , serum osmolality , serum uric acid , f/u with hyponatremia work up , f/u with bmp , monitor i and o      BP monitoring,continue current antihypertensive meds, low salt diet,followup with PMD in 1-2 weeks  losartan 50 mg oral tablet: 1 tab(s) orally once a day  metoprolol succinate 50 mg oral tablet, extended release: 1 tab(s) orally once a day

## 2022-07-18 NOTE — CONSULT NOTE ADULT - SUBJECTIVE AND OBJECTIVE BOX
Patient is a 89y Female whom presented to the hospital with     PAST MEDICAL & SURGICAL HISTORY:  HTN (hypertension)      Parkinsons      HLD (hyperlipidemia)      DVT, lower extremity      Hypothyroid      No significant past surgical history          MEDICATIONS  (STANDING):  ALBUTerol    90 MICROgram(s) HFA Inhaler      albuterol/ipratropium for Nebulization 3 milliLiter(s) Nebulizer every 6 hours  amLODIPine   Tablet 10 milliGRAM(s) Oral daily  apixaban 5 milliGRAM(s) Oral every 12 hours  ascorbic acid 500 milliGRAM(s) Oral daily  bethanechol 25 milliGRAM(s) Oral three times a day  carbidopa/levodopa  25/100 1 Tablet(s) Oral three times a day  chlorhexidine 4% Liquid 1 Application(s) Topical <User Schedule>  dextrose 5%. 1000 milliLiter(s) (50 mL/Hr) IV Continuous <Continuous>  furosemide    Tablet 40 milliGRAM(s) Oral daily  levothyroxine 100 MICROGram(s) Oral daily  losartan 50 milliGRAM(s) Oral daily  metolazone 5 milliGRAM(s) Oral daily  multivitamin 1 Tablet(s) Oral daily      Allergies    No Known Allergies    Intolerances  [facility]      SOCIAL HISTORY:  Denies ETOh,Smoking,     FAMILY HISTORY:      REVIEW OF SYSTEMS:    CONSTITUTIONAL: No weakness, fevers or chills  RESPIRATORY: No cough, wheezing, hemoptysis; No shortness of breath  CARDIOVASCULAR: No chest pain or palpitations  GASTROINTESTINAL: No abdominal or epigastric pain. No nausea, vomiting,   GENITOURINARY: No dysuria, frequency or hematuria  SKIN: dry      VITAL:  T(C): , Max: 37.6 (07-18-22 @ 00:20)  T(F): , Max: 99.6 (07-18-22 @ 00:20)  HR: 72 (07-18-22 @ 14:00)  BP: 111/63 (07-18-22 @ 14:00)  BP(mean): --  RR: 18 (07-18-22 @ 14:00)  SpO2: 96% (07-18-22 @ 14:00)  Wt(kg): --    I and O's:    07-17 @ 07:01  -  07-18 @ 07:00  --------------------------------------------------------  IN: 1500 mL / OUT: 600 mL / NET: 900 mL    07-18 @ 07:01  -  07-18 @ 16:32  --------------------------------------------------------  IN: 480 mL / OUT: 0 mL / NET: 480 mL                          11.6   7.36  )-----------( 337      ( 18 Jul 2022 07:25 )             37.9       CBC Full  -  ( 18 Jul 2022 07:25 )  WBC Count : 7.36 K/uL  RBC Count : 3.94 M/uL  Hemoglobin : 11.6 g/dL  Hematocrit : 37.9 %  Platelet Count - Automated : 337 K/uL  Mean Cell Volume : 96.2 fl  Mean Cell Hemoglobin : 29.4 pg  Mean Cell Hemoglobin Concentration : 30.6 gm/dL  Auto Neutrophil # : x  Auto Lymphocyte # : x  Auto Monocyte # : x  Auto Eosinophil # : x  Auto Basophil # : x  Auto Neutrophil % : x  Auto Lymphocyte % : x  Auto Monocyte % : x  Auto Eosinophil % : x  Auto Basophil % : x      07-18    149<H>  |  104  |  36<H>  ----------------------------<  89  3.5   |  30  |  1.14    Ca    9.3      18 Jul 2022 07:25        CAPILLARY BLOOD GLUCOSE          Vital Signs Last 24 Hrs  T(C): 36.6 (18 Jul 2022 14:00), Max: 37.6 (18 Jul 2022 00:20)  T(F): 97.9 (18 Jul 2022 14:00), Max: 99.6 (18 Jul 2022 00:20)  HR: 72 (18 Jul 2022 14:00) (67 - 82)  BP: 111/63 (18 Jul 2022 14:00) (109/61 - 151/71)  BP(mean): --  RR: 18 (18 Jul 2022 14:00) (18 - 18)  SpO2: 96% (18 Jul 2022 14:00) (88% - 96%)    Parameters below as of 18 Jul 2022 14:00  Patient On (Oxygen Delivery Method): room air                  PHYSICAL EXAM:    Constitutional: NAD  HEENT: conjunctive   clear   Neck:  No JVD  Respiratory: CTAB  Cardiovascular: S1 and S2  Gastrointestinal: BS+, soft, NT/ND  Extremities: No peripheral edema        Home Medications:  amLODIPine 10 mg oral tablet: 1 tab(s) orally once a day (09 Jul 2022 05:56)  ascorbic acid 500 mg oral tablet: 1 tab(s) orally 2 times a day (09 Jul 2022 05:56)  Bacid oral capsule: 1 tab(s) orally 3 times a day (09 Jul 2022 05:56)  carbidopa-levodopa 25 mg-100 mg oral tablet: 1 tab(s) orally 3 times a day (09 Jul 2022 05:56)  docusate sodium 100 mg oral capsule: 1 cap(s) orally once a day (09 Jul 2022 05:56)  DuoNeb 0.5 mg-2.5 mg/3 mL inhalation solution: 3 milliliter(s) inhaled 4 times a day (09 Jul 2022 05:56)  Eliquis 2.5 mg oral tablet: 1 tab(s) orally 2 times a day (09 Jul 2022 05:56)  escitalopram 10 mg oral tablet: 1 tab(s) orally once a day (09 Jul 2022 05:56)  fluticasone 50 mcg/inh inhalation powder: 2 puff(s) inhaled once a day (09 Jul 2022 05:56)  Lasix 40 mg oral tablet: 1 tab(s) orally once a day (09 Jul 2022 05:56)  losartan 50 mg oral tablet: 1 tab(s) orally once a day (09 Jul 2022 05:56)  melatonin 5 mg oral tablet: 1 tab(s) orally once a day (at bedtime) (09 Jul 2022 05:56)  metoprolol succinate 50 mg oral tablet, extended release: 1 tab(s) orally once a day (09 Jul 2022 05:56)  Multiple Vitamins oral capsule: 1 cap(s) orally once a day (09 Jul 2022 05:56)  potassium chloride 20 mEq oral tablet, extended release: 1 tab(s) orally once a day (09 Jul 2022 05:56)  Refresh ophthalmic solution: 1 drop(s) to each affected eye 2 times a day (09 Jul 2022 05:56)  Remeron 15 mg oral tablet: 1 tab(s) orally once a day (at bedtime) (09 Jul 2022 05:56)  Santyl 250 units/g topical ointment: Apply topically to affected area once a day (09 Jul 2022 05:56)  Senna 8.6 mg oral tablet: 1 tab(s) orally once a day (at bedtime) (09 Jul 2022 05:56)  simvastatin 20 mg oral tablet: 1 tab(s) orally once a day (at bedtime) (09 Jul 2022 05:56)  Synthroid 100 mcg (0.1 mg) oral tablet: 1 tab(s) orally once a day (09 Jul 2022 05:56)  Urecholine 25 mg oral tablet: 1 tab(s) orally 3 times a day (09 Jul 2022 05:56)     Patient is a 89y Female whom presented to the hospital with hypernatremia      PAST MEDICAL & SURGICAL HISTORY:  HTN (hypertension)      Parkinsons      HLD (hyperlipidemia)      DVT, lower extremity      Hypothyroid      No significant past surgical history          MEDICATIONS  (STANDING):  ALBUTerol    90 MICROgram(s) HFA Inhaler      albuterol/ipratropium for Nebulization 3 milliLiter(s) Nebulizer every 6 hours  amLODIPine   Tablet 10 milliGRAM(s) Oral daily  apixaban 5 milliGRAM(s) Oral every 12 hours  ascorbic acid 500 milliGRAM(s) Oral daily  bethanechol 25 milliGRAM(s) Oral three times a day  carbidopa/levodopa  25/100 1 Tablet(s) Oral three times a day  chlorhexidine 4% Liquid 1 Application(s) Topical <User Schedule>  dextrose 5%. 1000 milliLiter(s) (50 mL/Hr) IV Continuous <Continuous>  furosemide    Tablet 40 milliGRAM(s) Oral daily  levothyroxine 100 MICROGram(s) Oral daily  losartan 50 milliGRAM(s) Oral daily  metolazone 5 milliGRAM(s) Oral daily  multivitamin 1 Tablet(s) Oral daily      Allergies    No Known Allergies    Intolerances  [facility]      SOCIAL HISTORY:  Denies ETOh,Smoking,     FAMILY HISTORY:      REVIEW OF SYSTEMS:    CONSTITUTIONAL: No weakness, fevers or chills  RESPIRATORY: No cough, wheezing, hemoptysis; No shortness of breath  CARDIOVASCULAR: No chest pain or palpitations  GASTROINTESTINAL: No abdominal or epigastric pain. No nausea, vomiting,   GENITOURINARY: No dysuria, frequency or hematuria  SKIN: dry      VITAL:  T(C): , Max: 37.6 (07-18-22 @ 00:20)  T(F): , Max: 99.6 (07-18-22 @ 00:20)  HR: 72 (07-18-22 @ 14:00)  BP: 111/63 (07-18-22 @ 14:00)  BP(mean): --  RR: 18 (07-18-22 @ 14:00)  SpO2: 96% (07-18-22 @ 14:00)  Wt(kg): --    I and O's:    07-17 @ 07:01  -  07-18 @ 07:00  --------------------------------------------------------  IN: 1500 mL / OUT: 600 mL / NET: 900 mL    07-18 @ 07:01  -  07-18 @ 16:32  --------------------------------------------------------  IN: 480 mL / OUT: 0 mL / NET: 480 mL                          11.6   7.36  )-----------( 337      ( 18 Jul 2022 07:25 )             37.9       CBC Full  -  ( 18 Jul 2022 07:25 )  WBC Count : 7.36 K/uL  RBC Count : 3.94 M/uL  Hemoglobin : 11.6 g/dL  Hematocrit : 37.9 %  Platelet Count - Automated : 337 K/uL  Mean Cell Volume : 96.2 fl  Mean Cell Hemoglobin : 29.4 pg  Mean Cell Hemoglobin Concentration : 30.6 gm/dL  Auto Neutrophil # : x  Auto Lymphocyte # : x  Auto Monocyte # : x  Auto Eosinophil # : x  Auto Basophil # : x  Auto Neutrophil % : x  Auto Lymphocyte % : x  Auto Monocyte % : x  Auto Eosinophil % : x  Auto Basophil % : x      07-18    149<H>  |  104  |  36<H>  ----------------------------<  89  3.5   |  30  |  1.14    Ca    9.3      18 Jul 2022 07:25        CAPILLARY BLOOD GLUCOSE          Vital Signs Last 24 Hrs  T(C): 36.6 (18 Jul 2022 14:00), Max: 37.6 (18 Jul 2022 00:20)  T(F): 97.9 (18 Jul 2022 14:00), Max: 99.6 (18 Jul 2022 00:20)  HR: 72 (18 Jul 2022 14:00) (67 - 82)  BP: 111/63 (18 Jul 2022 14:00) (109/61 - 151/71)  BP(mean): --  RR: 18 (18 Jul 2022 14:00) (18 - 18)  SpO2: 96% (18 Jul 2022 14:00) (88% - 96%)    Parameters below as of 18 Jul 2022 14:00  Patient On (Oxygen Delivery Method): room air                  PHYSICAL EXAM:    Constitutional: NAD  HEENT: conjunctive   clear   Neck:  No JVD  Respiratory: CTAB  Cardiovascular: S1 and S2  Gastrointestinal: BS+, soft, NT/ND  Extremities: No peripheral edema        Home Medications:  amLODIPine 10 mg oral tablet: 1 tab(s) orally once a day (09 Jul 2022 05:56)  ascorbic acid 500 mg oral tablet: 1 tab(s) orally 2 times a day (09 Jul 2022 05:56)  Bacid oral capsule: 1 tab(s) orally 3 times a day (09 Jul 2022 05:56)  carbidopa-levodopa 25 mg-100 mg oral tablet: 1 tab(s) orally 3 times a day (09 Jul 2022 05:56)  docusate sodium 100 mg oral capsule: 1 cap(s) orally once a day (09 Jul 2022 05:56)  DuoNeb 0.5 mg-2.5 mg/3 mL inhalation solution: 3 milliliter(s) inhaled 4 times a day (09 Jul 2022 05:56)  Eliquis 2.5 mg oral tablet: 1 tab(s) orally 2 times a day (09 Jul 2022 05:56)  escitalopram 10 mg oral tablet: 1 tab(s) orally once a day (09 Jul 2022 05:56)  fluticasone 50 mcg/inh inhalation powder: 2 puff(s) inhaled once a day (09 Jul 2022 05:56)  Lasix 40 mg oral tablet: 1 tab(s) orally once a day (09 Jul 2022 05:56)  losartan 50 mg oral tablet: 1 tab(s) orally once a day (09 Jul 2022 05:56)  melatonin 5 mg oral tablet: 1 tab(s) orally once a day (at bedtime) (09 Jul 2022 05:56)  metoprolol succinate 50 mg oral tablet, extended release: 1 tab(s) orally once a day (09 Jul 2022 05:56)  Multiple Vitamins oral capsule: 1 cap(s) orally once a day (09 Jul 2022 05:56)  potassium chloride 20 mEq oral tablet, extended release: 1 tab(s) orally once a day (09 Jul 2022 05:56)  Refresh ophthalmic solution: 1 drop(s) to each affected eye 2 times a day (09 Jul 2022 05:56)  Remeron 15 mg oral tablet: 1 tab(s) orally once a day (at bedtime) (09 Jul 2022 05:56)  Santyl 250 units/g topical ointment: Apply topically to affected area once a day (09 Jul 2022 05:56)  Senna 8.6 mg oral tablet: 1 tab(s) orally once a day (at bedtime) (09 Jul 2022 05:56)  simvastatin 20 mg oral tablet: 1 tab(s) orally once a day (at bedtime) (09 Jul 2022 05:56)  Synthroid 100 mcg (0.1 mg) oral tablet: 1 tab(s) orally once a day (09 Jul 2022 05:56)  Urecholine 25 mg oral tablet: 1 tab(s) orally 3 times a day (09 Jul 2022 05:56)

## 2022-07-18 NOTE — PROGRESS NOTE ADULT - NS ATTEND AMEND GEN_ALL_CORE FT
Pt seen and examined with ACP.  Assessment and plan reviewed and discussed.  Agree with above.      I spent 25  minutes face to face w/ this pt of which more than 50% of the time was spent counseling & coordinating care of this pt.

## 2022-07-18 NOTE — PROGRESS NOTE ADULT - SUBJECTIVE AND OBJECTIVE BOX
[facility]-- WOUND TEAM -- FOLLOW UP NOTE  --------------------------------------------------------------------------------    24 hour events/subjective:    tolerating po w/o n/v  incontinent of urine and stool  requires assist w/ ADLs  Afebrile  no specific c/o pain, odor, excess drainage  pt noted to scratch, RN trying to prevent and remind pt not to      Diet:  Diet, Pureed:   Moderately Thick Liquids (MODTHICKLIQS)  Supplement Feeding Modality:  Oral  Ensure Enlive Cans or Servings Per Day:  2       Frequency:  Daily (07-15-22 @ 13:04)      ROS: pt unable to offer    ALLERGIES & MEDICATIONS  --------------------------------------------------------------------------------    No Known Allergies    STANDING INPATIENT MEDICATIONS  ALBUTerol    90 MICROgram(s) HFA Inhaler      albuterol/ipratropium for Nebulization 3 milliLiter(s) Nebulizer every 6 hours  amLODIPine   Tablet 10 milliGRAM(s) Oral daily  apixaban 5 milliGRAM(s) Oral every 12 hours  ascorbic acid 500 milliGRAM(s) Oral daily  bethanechol 25 milliGRAM(s) Oral three times a day  carbidopa/levodopa  25/100 1 Tablet(s) Oral three times a day  chlorhexidine 4% Liquid 1 Application(s) Topical <User Schedule>  furosemide    Tablet 40 milliGRAM(s) Oral daily  levothyroxine 100 MICROGram(s) Oral daily  losartan 50 milliGRAM(s) Oral daily  metolazone 5 milliGRAM(s) Oral daily  multivitamin 1 Tablet(s) Oral daily      PRN INPATIENT MEDICATION  acetaminophen     Tablet .. 650 milliGRAM(s) Oral every 6 hours PRN  melatonin 3 milliGRAM(s) Oral at bedtime PRN        VITALS/PHYSICAL EXAM  --------------------------------------------------------------------------------  T(C): 37.2 (07-18-22 @ 08:07), Max: 37.6 (07-18-22 @ 00:20)  HR: 75 (07-18-22 @ 08:07) (67 - 82)  BP: 138/68 (07-18-22 @ 08:07) (109/61 - 151/71)  RR: 18 (07-18-22 @ 10:02) (18 - 18)  SpO2: 94% (07-18-22 @ 10:02) (88% - 94%)  Wt(kg): --        07-17-22 @ 07:01  -  07-18-22 @ 07:00  --------------------------------------------------------  IN: 1500 mL / OUT: 600 mL / NET: 900 mL    07-18-22 @ 07:01  -  07-18-22 @ 14:09  --------------------------------------------------------  IN: 480 mL / OUT: 0 mL / NET: 480 mL    NAD, Alert,  Obese, frail  Total Care Sport bed   HEENT:  NC/AT, EOMI, sclera clear, mucosa moist, throat clear, trachea midline, neck supple, trach  Neurology: mildly confused, weakened strength, sensation grossly intact  Psych: easily agitated    Musculoskeletal: BUE w/ limited stiff FROM, BLE w/ contractures  Vascular: BLE equally warm,  no cyanosis, clubbing, edema       (+)DP pulses       Lt planter forefoot  w/ 3cm x 3cm and Lt 4th toe adjacent to nailbed w/ 0.2cm x 0.4cm x 0cm          w/ DTIs w/o blistering, drainage, or open skin       Rt posterior heel w/ blanchable erythema w/o blistering, drainage, or open skin  No odor, erythema, increased warmth, tenderness, induration, fluctuance, nor crepitus  Skin:  pale, frail,  ecchymosis w/o hematoma      Lt hip excoriation - denuded skin w/o drainage or blistering      Stage 4 sacral pressure ulcer  0.5cm x 1.5 x 0.5cm       healing with contracted skin edges w/ epiboly      what is visible of wound bed is granular (+)serosanguinous drainage  No odor, erythema, increased warmth, tenderness, induration, fluctuance, nor crepitus          LABS/ CULTURES/ RADIOLOGY:              11.6   7.36  >-----------<  337      [07-18-22 @ 07:25]              37.9     149  |  104  |  36  ----------------------------<  89      [07-18-22 @ 07:25]  3.5   |  30  |  1.14        Ca     9.3     [07-18-22 @ 07:25]                CAPILLARY BLOOD GLUCOSE                      Culture - Blood (collected 07-13-22 @ 00:29)  Source: .Blood Blood  Final Report (07-18-22 @ 03:00):    No Growth Final    Culture - Blood (collected 07-13-22 @ 00:29)  Source: .Blood Blood-Venous  Final Report (07-18-22 @ 03:00):    No Growth Final           [facility]-- WOUND TEAM -- FOLLOW UP NOTE  --------------------------------------------------------------------------------    24 hour events/subjective:    tolerating po w/o n/v  incontinent of urine and stool  requires assist w/ ADLs  Afebrile  no specific c/o pain, odor, excess drainage  pt noted to scratch, RN trying to prevent and remind pt not to      Diet:  Diet, Pureed:   Moderately Thick Liquids (MODTHICKLIQS)  Supplement Feeding Modality:  Oral  Ensure Enlive Cans or Servings Per Day:  2       Frequency:  Daily (07-15-22 @ 13:04)      ROS: pt unable to offer    ALLERGIES & MEDICATIONS  --------------------------------------------------------------------------------    No Known Allergies    STANDING INPATIENT MEDICATIONS  ALBUTerol    90 MICROgram(s) HFA Inhaler      albuterol/ipratropium for Nebulization 3 milliLiter(s) Nebulizer every 6 hours  amLODIPine   Tablet 10 milliGRAM(s) Oral daily  apixaban 5 milliGRAM(s) Oral every 12 hours  ascorbic acid 500 milliGRAM(s) Oral daily  bethanechol 25 milliGRAM(s) Oral three times a day  carbidopa/levodopa  25/100 1 Tablet(s) Oral three times a day  chlorhexidine 4% Liquid 1 Application(s) Topical <User Schedule>  furosemide    Tablet 40 milliGRAM(s) Oral daily  levothyroxine 100 MICROGram(s) Oral daily  losartan 50 milliGRAM(s) Oral daily  metolazone 5 milliGRAM(s) Oral daily  multivitamin 1 Tablet(s) Oral daily      PRN INPATIENT MEDICATION  acetaminophen     Tablet .. 650 milliGRAM(s) Oral every 6 hours PRN  melatonin 3 milliGRAM(s) Oral at bedtime PRN        VITALS/PHYSICAL EXAM  --------------------------------------------------------------------------------  T(C): 37.2 (07-18-22 @ 08:07), Max: 37.6 (07-18-22 @ 00:20)  HR: 75 (07-18-22 @ 08:07) (67 - 82)  BP: 138/68 (07-18-22 @ 08:07) (109/61 - 151/71)  RR: 18 (07-18-22 @ 10:02) (18 - 18)  SpO2: 94% (07-18-22 @ 10:02) (88% - 94%)  Wt(kg): --        07-17-22 @ 07:01  -  07-18-22 @ 07:00  --------------------------------------------------------  IN: 1500 mL / OUT: 600 mL / NET: 900 mL    07-18-22 @ 07:01  -  07-18-22 @ 14:09  --------------------------------------------------------  IN: 480 mL / OUT: 0 mL / NET: 480 mL    NAD, Alert,  Obese, frail  Total Care Sport bed   HEENT:  NC/AT, EOMI, sclera clear, mucosa moist, throat clear, trachea midline, neck supple  Neurology: mildly confused, weakened strength, sensation grossly intact  Psych: easily agitated    Musculoskeletal: BUE w/ limited stiff FROM, BLE w/ contractures  Vascular: BLE equally warm,  no cyanosis, clubbing, edema       (+)DP pulses       Lt planter forefoot  w/ 3cm x 3cm and Lt 4th toe adjacent to nailbed w/ 0.2cm x 0.4cm x 0cm          w/ DTIs w/o blistering, drainage, or open skin       Rt posterior heel w/ blanchable erythema w/o blistering, drainage, or open skin  No odor, erythema, increased warmth, tenderness, induration, fluctuance, nor crepitus  Skin:  pale, frail,  ecchymosis w/o hematoma      Lt hip excoriation - denuded skin w/o drainage or blistering      Stage 4 sacral pressure ulcer  0.5cm x 1.5 x 0.5cm       healing with contracted skin edges w/ epiboly      what is visible of wound bed is granular (+)serosanguinous drainage  No odor, erythema, increased warmth, tenderness, induration, fluctuance, nor crepitus          LABS/ CULTURES/ RADIOLOGY:              11.6   7.36  >-----------<  337      [07-18-22 @ 07:25]              37.9     149  |  104  |  36  ----------------------------<  89      [07-18-22 @ 07:25]  3.5   |  30  |  1.14        Ca     9.3     [07-18-22 @ 07:25]                CAPILLARY BLOOD GLUCOSE                      Culture - Blood (collected 07-13-22 @ 00:29)  Source: .Blood Blood  Final Report (07-18-22 @ 03:00):    No Growth Final    Culture - Blood (collected 07-13-22 @ 00:29)  Source: .Blood Blood-Venous  Final Report (07-18-22 @ 03:00):    No Growth Final

## 2022-07-18 NOTE — DISCHARGE NOTE PROVIDER - ATTENDING DISCHARGE PHYSICAL EXAMINATION:
AO x 1   CVS: s1 s2 nm  lungs: CTA  Abdomen: NT NR BS normoactive   Ext: contracted, left foot blister

## 2022-07-18 NOTE — DISCHARGE NOTE PROVIDER - NSDCMRMEDTOKEN_GEN_ALL_CORE_FT
amLODIPine 10 mg oral tablet: 1 tab(s) orally once a day  ascorbic acid 500 mg oral tablet: 1 tab(s) orally 2 times a day  Bacid oral capsule: 1 tab(s) orally 3 times a day  carbidopa-levodopa 25 mg-100 mg oral tablet: 1 tab(s) orally 3 times a day  docusate sodium 100 mg oral capsule: 1 cap(s) orally once a day  DuoNeb 0.5 mg-2.5 mg/3 mL inhalation solution: 3 milliliter(s) inhaled 4 times a day  Eliquis 2.5 mg oral tablet: 1 tab(s) orally 2 times a day  escitalopram 10 mg oral tablet: 1 tab(s) orally once a day  fluticasone 50 mcg/inh inhalation powder: 2 puff(s) inhaled once a day  Lasix 40 mg oral tablet: 1 tab(s) orally once a day  losartan 50 mg oral tablet: 1 tab(s) orally once a day  melatonin 5 mg oral tablet: 1 tab(s) orally once a day (at bedtime)  metoprolol succinate 50 mg oral tablet, extended release: 1 tab(s) orally once a day  Multiple Vitamins oral capsule: 1 cap(s) orally once a day  potassium chloride 20 mEq oral tablet, extended release: 1 tab(s) orally once a day  Refresh ophthalmic solution: 1 drop(s) to each affected eye 2 times a day  Remeron 15 mg oral tablet: 1 tab(s) orally once a day (at bedtime)  Santyl 250 units/g topical ointment: Apply topically to affected area once a day  Senna 8.6 mg oral tablet: 1 tab(s) orally once a day (at bedtime)  simvastatin 20 mg oral tablet: 1 tab(s) orally once a day (at bedtime)  Synthroid 100 mcg (0.1 mg) oral tablet: 1 tab(s) orally once a day  Urecholine 25 mg oral tablet: 1 tab(s) orally 3 times a day   amLODIPine 10 mg oral tablet: 1 tab(s) orally once a day  apixaban 2.5 mg oral tablet: 1 tab(s) orally every 12 hours  ascorbic acid 500 mg oral tablet: 1 tab(s) orally 2 times a day  Bacid oral capsule: 1 tab(s) orally 3 times a day  carbidopa-levodopa 25 mg-100 mg oral tablet: 1 tab(s) orally 3 times a day  carbidopa-levodopa 25 mg-100 mg oral tablet: 1 tab(s) orally 3 times a day  docusate sodium 100 mg oral capsule: 1 cap(s) orally once a day  DuoNeb 0.5 mg-2.5 mg/3 mL inhalation solution: 3 milliliter(s) inhaled 4 times a day  Eliquis 2.5 mg oral tablet: 1 tab(s) orally 2 times a day  escitalopram 10 mg oral tablet: 1 tab(s) orally once a day  fluticasone 50 mcg/inh inhalation powder: 2 puff(s) inhaled once a day  Lasix 40 mg oral tablet: 1 tab(s) orally once a day  levothyroxine 100 mcg (0.1 mg) oral tablet: 1 tab(s) orally once a day  losartan 50 mg oral tablet: 1 tab(s) orally once a day  melatonin 5 mg oral tablet: 1 tab(s) orally once a day (at bedtime)  metoprolol succinate 50 mg oral tablet, extended release: 1 tab(s) orally once a day  Multiple Vitamins oral capsule: 1 cap(s) orally once a day  potassium chloride 20 mEq oral tablet, extended release: 1 tab(s) orally once a day  Refresh ophthalmic solution: 1 drop(s) to each affected eye 2 times a day  Remeron 15 mg oral tablet: 1 tab(s) orally once a day (at bedtime)  Santyl 250 units/g topical ointment: Apply topically to affected area once a day  Senna 8.6 mg oral tablet: 1 tab(s) orally once a day (at bedtime)  simvastatin 20 mg oral tablet: 1 tab(s) orally once a day (at bedtime)  Synthroid 100 mcg (0.1 mg) oral tablet: 1 tab(s) orally once a day  Urecholine 25 mg oral tablet: 1 tab(s) orally 3 times a day   apixaban 2.5 mg oral tablet: 1 tab(s) orally every 12 hours  ascorbic acid 500 mg oral tablet: 1 tab(s) orally 2 times a day  carbidopa-levodopa 25 mg-100 mg oral tablet: 1 tab(s) orally 3 times a day  docusate sodium 100 mg oral capsule: 1 cap(s) orally once a day  DuoNeb 0.5 mg-2.5 mg/3 mL inhalation solution: 3 milliliter(s) inhaled 4 times a day  epoetin jus: 10855 unit(s) subcutaneous 3 times a week.  GIVEN ON Monday, Wednesday, Friday  escitalopram 10 mg oral tablet: 1 tab(s) orally once a day  levothyroxine 100 mcg (0.1 mg) oral tablet: 1 tab(s) orally once a day  melatonin 5 mg oral tablet: 1 tab(s) orally once a day (at bedtime)  Multiple Vitamins oral capsule: 1 cap(s) orally once a day  Remeron 15 mg oral tablet: 1 tab(s) orally once a day (at bedtime)  Santyl 250 units/g topical ointment: Apply topically to affected area once a day  Senna 8.6 mg oral tablet: 1 tab(s) orally once a day (at bedtime)  simvastatin 20 mg oral tablet: 1 tab(s) orally once a day (at bedtime)

## 2022-07-18 NOTE — PROGRESS NOTE ADULT - ASSESSMENT
A/P: 89 year old female with PMH of Parkinson's disease, HTN, chronic DVT on Eliquis, and hypothyroidism  who was admitted for acute hypoxic respiratory failure most likely due to pneumonia.    Wound Consult requested to assist w/ management of Sacral stage 4 pressure injury  Incontinence of urine and stool      Sacral wound- Aquacel dressing QD  BLE elevation  Abx per medicine  Moisturize intact skin w/ SWEEN cream BID  Nutrition Consult for optimization in pt w/ Increased nutritional needs        encourage high quality protein, MVI & Vit deficiencies to promote wound healing  Continue turning and positioning w/ offloading assistive devices as per protocol  Buttocks/ Sacrum: Mar BID and prn soiling //       Continue w/ attends under pads and Pericare as per protocol  Waffle Cushion to chair when oob to chair  Continue w/ low air loss pressure redistribution bed surface   Care as per medicine, will follow w/ you  Upon discharge f/u as outpatient at Wound Center [address] [place] [address] [phone]  Seen w/ RN & attng and D/w team  Thank you for this consult  Ashley Quiroga PA-C CWS [id number]  I spent 25 minutes face to face w/ this pt of which more than 50% of the time was spent counseling & coordinating care of this pt.  A/P: 89 year old female with PMH of Parkinson's disease, HTN, chronic DVT on Eliquis, and hypothyroidism  who was admitted for acute hypoxic respiratory failure most likely due to pneumonia.    Wound Consult requested to assist w/ management of   Sacral stage 4 pressure injury  Left plantar foot DTI  Left 4th toe DTI  Left hip excoriation  Rt heel blanchable redness  Incontinence of urine and stool      Sacral wound- Aquacel dressing QD  Left plantar foot, left 4th toe and left hip, rt heel-- Cavilon  Consider DPM consult  BLE elevation  Abx per medicine  Moisturize intact skin w/ SWEEN cream BID  Nutrition Consult for optimization in pt w/ Increased nutritional needs        encourage high quality protein, MVI & Vit deficiencies to promote wound healing  Continue turning and positioning w/ offloading assistive devices as per protocol  Buttocks/ Sacrum: Mar BID and prn soiling //       Continue w/ attends under pads and Pericare as per protocol  Waffle Cushion to chair when oob to chair  Continue w/ low air loss pressure redistribution bed surface   Care as per medicine, will follow w/ you  Upon discharge f/u as outpatient at Wound Center [address] [place], [place] [phone]  Seen w/ RN & attng and D/w team  Thank you for this consult  Ashley Quiroga PA-C CWS [id number]  I spent 25 minutes face to face w/ this pt of which more than 50% of the time was spent counseling & coordinating care of this pt.  A/P: 89 year old female with PMH of Parkinson's disease, HTN, chronic DVT on Eliquis, and hypothyroidism  who was admitted for acute hypoxic respiratory failure most likely due to pneumonia.    Wound Consult requested to assist w/ management of   Sacral stage 4 pressure injury  Left plantar foot DTI  Left 4th toe DTI  Left hip excoriation  Rt heel blanchable redness  Incontinence of urine and stool      Sacral wound- Aquacel dressing QD  Left plantar foot, left 4th toe and left hip, rt heel-- Cavilon  Consider DPM consult  BLE elevation  Abx per medicine  Moisturize intact skin w/ SWEEN cream BID  Nutrition Consult for optimization in pt w/ Increased nutritional needs        encourage high quality protein, MVI & Vit deficiencies to promote wound healing  Continue turning and positioning w/ offloading assistive devices as per protocol  Buttocks/ Sacrum: Mar BID and prn soiling //       Continue w/ attends under pads and Pericare as per protocol  Waffle Cushion to chair when oob to chair  Continue w/ low air loss pressure redistribution bed surface   Care as per medicine, will follow w/ you  Upon discharge f/u as outpatient at Wound Center [address] [place] [address] [phone]  Seen w/ RN & attng and D/w team  Thank you for this consult  Ashley Quiroga PA-C CWS [id number]

## 2022-07-18 NOTE — DISCHARGE NOTE PROVIDER - PROVIDER TOKENS
FREE:[LAST:[Wound Center],PHONE:[phone]],FAX:[(   [phone]    -],ADDRESS:[[address] [place] [address]],FOLLOWUP:[1 week]],PROVIDER:[TOKEN:[[id number]],FOLLOWUP:[1 week]]

## 2022-07-18 NOTE — DISCHARGE NOTE PROVIDER - CARE PROVIDER_API CALL
[facility],   [address] [facility]  Phone: [phone]  Fax: (   )    -  Follow Up Time: 1 week    Osmar Huang)  Internal Medicine  [address] [place] [address]  Phone: [phone]  Fax: [phone]  Follow Up Time: 1 week

## 2022-07-18 NOTE — DISCHARGE NOTE PROVIDER - NSDCCPCAREPLAN_GEN_ALL_CORE_FT
PRINCIPAL DISCHARGE DIAGNOSIS  Diagnosis: Acute respiratory failure with hypoxia and hypercapnia  Assessment and Plan of Treatment: - CXR 7/9 with left mid/lower lung predominant airspace opacities, may represent pulmonary edema or pneumonia.  - s/p Vancomycin (7/9-7/11 ) Zosyn (7/9-7/11) Ceftriaxone (7/11-7/13) Zosyn (7/13-7/15)  - intubated on 7/9 and extubated on 7/15. Transitioned to CPAP now with O2 saturation stable on room air   - Speech and swallow was consulted post extubation recommending pureed and moderately thickened liquids, 1:1 assist for all PO intake, crush medications and place in applesauce when feasible      SECONDARY DISCHARGE DIAGNOSES  Diagnosis: Pneumonia  Assessment and Plan of Treatment: Pneumonia is a lung infection that can cause a fever, cough, and trouble breathing.  You completed a course of antibiotics.  Nutrition is important, eat small frequent meals.  Get lots of rest and drink fluids.  Call your health care provider upon arrival home from hospital and make a follow up appointment for one week.  If your cough worsens, you develop fever greater than 101', you have shaking chills, a fast heartbeat, trouble breathing and/or feel your are breathing much faster than usual, call your healthcare provider.  Make sure you wash your hands frequently.      Diagnosis: Acute UTI  Assessment and Plan of Treatment: You had a bladder and/or kidney infection. You completed a course of IV antibiotics. Avoid medications that will cause urinary retention such as benadryl whenever possible.  Drink adequate fluids - there is no harm in drinking cranberry juice.  Females should urinate right after sex.  Contact your doctor if you experience new symptoms or continued symptoms after treatment, such as pain or burning with urination, frequent urination, urinary urgency, blood in the urine, fever, back pain, and/or nausea vomiting.      Diagnosis: Sacral decubitus ulcer  Assessment and Plan of Treatment: Wound care consulted recommending:   - Aquacel dressing QD  - BLE elevation  - Moisturize intact skin w/ SWEEN cream BID  - Continue turning and positioning w/ offloading assistive devices as per protocol  - Buttocks/ Sacrum: Mar BID and prn soiling   - Waffle Cushion to chair when oob to chair  - Continue w/ low air loss pressure redistribution bed surface   - Upon discharge follow up as outpatient at Wound Center [address] [place] [address] [phone]     PRINCIPAL DISCHARGE DIAGNOSIS  Diagnosis: Acute respiratory failure with hypoxia and hypercapnia  Assessment and Plan of Treatment: - CXR 7/9 with left mid/lower lung predominant airspace opacities, may represent pulmonary edema or pneumonia.  - s/p Vancomycin (7/9-7/11 ) Zosyn (7/9-7/11) Ceftriaxone (7/11-7/13) Zosyn (7/13-7/15)  - intubated on 7/9 and extubated on 7/15. Transitioned to CPAP now with O2 saturation stable on room air   - Speech and swallow was consulted post extubation recommending pureed and moderately thickened liquids, 1:1 assist for all PO intake, crush medications and place in applesauce when feasible      SECONDARY DISCHARGE DIAGNOSES  Diagnosis: Skin rash  Assessment and Plan of Treatment: New - Noted today.  Observe. Notify Dr Boucher if not improving.    Diagnosis: Pneumonia  Assessment and Plan of Treatment: Pneumonia is a lung infection that can cause a fever, cough, and trouble breathing.  You completed a course of antibiotics.  Nutrition is important, eat small frequent meals.  Get lots of rest and drink fluids.  Call your health care provider upon arrival home from hospital and make a follow up appointment for one week.  If your cough worsens, you develop fever greater than 101', you have shaking chills, a fast heartbeat, trouble breathing and/or feel your are breathing much faster than usual, call your healthcare provider.  Make sure you wash your hands frequently.      Diagnosis: Acute UTI  Assessment and Plan of Treatment: You had a bladder and/or kidney infection. You completed a course of IV antibiotics. Avoid medications that will cause urinary retention such as benadryl whenever possible.  Drink adequate fluids - there is no harm in drinking cranberry juice.  Females should urinate right after sex.  Contact your doctor if you experience new symptoms or continued symptoms after treatment, such as pain or burning with urination, frequent urination, urinary urgency, blood in the urine, fever, back pain, and/or nausea vomiting.      Diagnosis: Sacral decubitus ulcer  Assessment and Plan of Treatment: Wound care consulted recommending:   - Aquacel dressing QD  - BLE elevation  - Moisturize intact skin w/ SWEEN cream BID  - Continue turning and positioning w/ offloading assistive devices as per protocol  - Buttocks/ Sacrum: Mar BID and prn soiling   - Waffle Cushion to chair when oob to chair  - Continue w/ low air loss pressure redistribution bed surface   - Upon discharge follow up as outpatient at Wound Center [address] [facility] [phone]

## 2022-07-18 NOTE — PROGRESS NOTE ADULT - SUBJECTIVE AND OBJECTIVE BOX
Patient is a 89y old  Female who presents with a chief complaint of Shortness of breath (18 Jul 2022 15:15)      INTERVAL HPI/OVERNIGHT EVENTS: Patient is hypernatremic. Consulted nephrologist. Started on IVF D5 W. Monitor sodium level.     Pain Location & Control: OK    MEDICATIONS  (STANDING):  ALBUTerol    90 MICROgram(s) HFA Inhaler      albuterol/ipratropium for Nebulization 3 milliLiter(s) Nebulizer every 6 hours  amLODIPine   Tablet 10 milliGRAM(s) Oral daily  apixaban 5 milliGRAM(s) Oral every 12 hours  ascorbic acid 500 milliGRAM(s) Oral daily  bethanechol 25 milliGRAM(s) Oral three times a day  carbidopa/levodopa  25/100 1 Tablet(s) Oral three times a day  chlorhexidine 4% Liquid 1 Application(s) Topical <User Schedule>  dextrose 5%. 1000 milliLiter(s) (50 mL/Hr) IV Continuous <Continuous>  furosemide    Tablet 40 milliGRAM(s) Oral daily  levothyroxine 100 MICROGram(s) Oral daily  losartan 50 milliGRAM(s) Oral daily  metolazone 5 milliGRAM(s) Oral daily  multivitamin 1 Tablet(s) Oral daily    MEDICATIONS  (PRN):  acetaminophen     Tablet .. 650 milliGRAM(s) Oral every 6 hours PRN Temp greater or equal to 38C (100.4F), Mild Pain (1 - 3)  melatonin 3 milliGRAM(s) Oral at bedtime PRN Insomnia      Allergies    No Known Allergies    Intolerances        REVIEW OF SYSTEMS:  CONSTITUTIONAL: No fever, weight loss, or fatigue  EYES: No eye pain, visual disturbances, or discharge  ENMT:  No difficulty hearing, tinnitus, vertigo; No sinus or throat pain  NECK: No pain or stiffness  BREASTS: No pain, masses, or nipple discharge  RESPIRATORY: No cough, wheezing, chills or hemoptysis; No shortness of breath  CARDIOVASCULAR: No chest pain, palpitations, dizziness, or leg swelling  GASTROINTESTINAL: No abdominal or epigastric pain. No nausea, vomiting, or hematemesis; No diarrhea or constipation. No melena or hematochezia.  GENITOURINARY: No dysuria, frequency, hematuria, or incontinence  NEUROLOGICAL: No headaches, memory loss, loss of strength, numbness, or tremors  SKIN: No itching, burning, rashes, or lesions   LYMPH NODES: No enlarged glands  ENDOCRINE: No heat or cold intolerance; No hair loss; No polydipsia or polyuria  MUSCULOSKELETAL: No back pain  PSYCHIATRIC: No depression, anxiety, mood swings, or difficulty sleeping  HEME/LYMPH: No easy bruising, or bleeding gums  ALLERGY AND IMMUNOLOGIC: No hives or eczema    Vital Signs Last 24 Hrs  T(C): 37.2 (18 Jul 2022 19:18), Max: 37.6 (18 Jul 2022 00:20)  T(F): 99 (18 Jul 2022 19:18), Max: 99.6 (18 Jul 2022 00:20)  HR: 73 (18 Jul 2022 19:18) (68 - 82)  BP: 132/65 (18 Jul 2022 19:18) (111/63 - 151/71)  BP(mean): --  RR: 18 (18 Jul 2022 19:18) (18 - 18)  SpO2: 94% (18 Jul 2022 19:18) (88% - 96%)    Parameters below as of 18 Jul 2022 19:18  Patient On (Oxygen Delivery Method): room air        PHYSICAL EXAM:  GENERAL: NAD, well-groomed, well-developed  HEAD:  Atraumatic, Normocephalic  EYES: EOMI, PERRLA, conjunctiva and sclera clear  ENMT: No tonsillar erythema, exudates, or enlargement; Moist mucous membranes, Good dentition, No lesions  NECK: Supple, No JVD, Normal thyroid  NERVOUS SYSTEM:  Alert & Oriented X3, Good concentration; Motor Strength 5/5 B/L upper and lower extremities; DTRs 2+ intact and symmetric  CHEST/LUNG: Clear to auscultation bilaterally; No rales, rhonchi, wheezing, or rubs  HEART: Regular rate and rhythm; No murmurs, rubs, or gallops  ABDOMEN: Soft, Nontender, Nondistended; Bowel sounds present  EXTREMITIES:  2+ Peripheral Pulses, No clubbing or cyanosis  LYMPH: No lymphadenopathy noted  SKIN: No rashes or lesions      LABS:                        11.6   7.36  )-----------( 337      ( 18 Jul 2022 07:25 )             37.9     18 Jul 2022 07:25    149    |  104    |  36     ----------------------------<  89     3.5     |  30     |  1.14     Ca    9.3        18 Jul 2022 07:25          CAPILLARY BLOOD GLUCOSE            Cultures  Culture Results:   No Growth Final (07-13-22 @ 00:29)  Culture Results:   No Growth Final (07-13-22 @ 00:29)      RADIOLOGY & ADDITIONAL TESTS:    Imaging Personally Reviewed:  [X ] YES  [ ] NO    Consultant(s) Notes Reviewed:  [X ] YES  [ ] NO    Care Discussed with Consultants/Other Providers [X ] YES  [ ] NO

## 2022-07-18 NOTE — DISCHARGE NOTE PROVIDER - HOSPITAL COURSE
Initial Clinical Review    Admission: Date/Time/Statement: 3/3/19 @ 0211   Orders Placed This Encounter   Procedures    Inpatient Admission     Standing Status:   Standing     Number of Occurrences:   1     Order Specific Question:   Admitting Physician     Answer:   Amrik Jackson     Order Specific Question:   Level of Care     Answer:   Med Surg [16]     Order Specific Question:   Estimated length of stay     Answer:   More than 2 Midnights     Order Specific Question:   Certification     Answer:   I certify that inpatient services are medically necessary for this patient for a duration of greater than two midnights  See H&P and MD Progress Notes for additional information about the patient's course of treatment  ED: Date/Time/Mode of Arrival:   ED Arrival Information     Expected Arrival Acuity Means of Arrival Escorted By Service Admission Type    - 3/2/2019 23:45 Urgent Walk-In Family Member General Medicine Urgent    Arrival Complaint    Abdominal Pain        Chief Complaint:   Chief Complaint   Patient presents with    Abdominal Pain     Pt c/o of abdominal pain that started earlier today, +n/v, pt states he had a normal bowel movement earlier today     History of Illness: Natalie Harris is a 68 y o  male who presents with abdominal pain sudden onset at noon, intermittent, episode lasted 1-2 min, every 5-10 minutes, sharp pain, supraumbilical and epigastric, associated with multiple episodes of nausea and vomiting  Patient has similar episode in the diagnosed as partial small bowel obstruction, so symptoms were similar can't emergency room  ER course:  CT abdomen suggestive for PSBO, started on IV fluids symptomatic medication will admit for bowel rest and surgical evaluation        ED Vital Signs:   ED Triage Vitals [03/02/19 2354]   Temperature Pulse Respirations Blood Pressure SpO2   (!) 97 4 °F (36 3 °C) 99 19 162/73 96 %      Temp Source Heart Rate Source Patient Position - Orthostatic VS This is a 89-Year-old Female PMH Dementia, Parkinson's Disease, Dysphagia, DVT, Muscle Weakness, Gait Instability, Pressure Ulcer, DVT, HTN, hypothyroidism, depression, mood disorder, dementia, decubitus ulcers, sepsis, reflux, insomnia, constipation, HLD who was found hypoxic in SNF. She was treated with one dose of IM lasix and IV Zosyn, O2 initially improved but then dropped again to 80s with respiratory distress. She was transferred to [facility] for more evaluation due to acute respiratory failure. She was found with Co2 retention and acidosis. Patient failed NIPPV attempt and intubated in ED + started on broad IV abx therapy. Patient was admitted to MICU for further evaluation.     Acute hypoxemic respiratory failure due to aspiration pneumonia  - extubated on 7/15 and transitioned to CPAP   - Vanc (7/9-7/11 ) Zosyn (7/9-7/11) Ceftriaxone (7/11-7/13) Zosyn (7/13-)  - CT head with no acute intracranial hemorrhage, hydrocephalus or extra-axial fluid collection. mild parenchymal volume loss and mild chronic microvascular ischemic changes    UTI  - E coli.       Sacral decubitus ulcer  - MRSA nasal swab negative.  Wound care consulted recommending:   - Aquacel dressing QD  - BLE elevation  - Moisturize intact skin w/ SWEEN cream BID  - Continue turning and positioning w/ offloading assistive devices as per protocol  - Buttocks/ Sacrum: Mar BID and prn soiling   - Waffle Cushion to chair when oob to chair  - Continue w/ low air loss pressure redistribution bed surface   - Upon discharge f/u as outpatient at [facility] [address] [facility] [phone]    Heart-failure? / fluid overloaded  - BNP 3075 on 7/9  - echo with EF of 55% and mild aortic regurgitation,  paradoxical septal wall motion abnormality due to conduction abnormality  - Treated with IV lasix - transitioned to home lasix 40mg PO     Chronic DVT  - apixaban 5mg q12hrs    Parkinson's disease. Continue sinemet.   BP Location FiO2 (%)   Oral Monitor Sitting Right arm --      Pain Score       2        Wt Readings from Last 1 Encounters:   03/03/19 130 kg (285 lb 15 oz)     Vital Signs (abnormal):  wnl   Pertinent Labs/Diagnostic Test Results:lactic acid   2 1, cl 98, BUN creat   23 1 72  Wbc  18 23  CT abd- There is moderate small bowel dilatation with a focal diameter change in the mid abdomen   Stool is seen within the colon   Favor ileus/incomplete obstruction over a high-grade obstruction  ED Treatment:   Medication Administration from 03/02/2019 2345 to 03/03/2019 1648       Date/Time Order Dose Route Action Action by Comments     03/03/2019 0147 sodium chloride 0 9 % bolus 1,000 mL 0 mL Intravenous Stopped Pheobe Chino Dotson RN      03/03/2019 0017 sodium chloride 0 9 % bolus 1,000 mL 1,000 mL Intravenous New Bag Wendie De Oliveira RN      03/03/2019 0020 ondansetron (ZOFRAN) injection 4 mg 4 mg Intravenous Given Wendie De Oliveira RN      03/03/2019 0019 morphine injection 2 mg 2 mg Intravenous Given Wendie De Oliveira RN      03/03/2019 0110 iodixanol (VISIPAQUE) 320 MG/ML injection 100 mL 100 mL Intravenous Given Jani Holatesha      03/03/2019 0145 morphine injection 1 mg 1 mg Intravenous Given Wendie De Oliveira RN      03/03/2019 0313 HYDROmorphone (DILAUDID) injection 0 5 mg 0 5 mg Intravenous Given Wendie De Oliveira RN      03/03/2019 0552 sodium chloride 0 9 % bolus 1,000 mL 0 mL Intravenous Stopped Vijay Johnson RN      03/03/2019 0313 sodium chloride 0 9 % bolus 1,000 mL 1,000 mL Intravenous [address] Wendie De Oliveira RN      03/03/2019 0557 sodium chloride 0 9 % infusion 100 mL/hr Intravenous [address] Vijay Johnson RN      03/03/2019 0825 enoxaparin (LOVENOX) subcutaneous injection 40 mg 40 mg Subcutaneous Given Abdon Harrington RN         Past Medical/Surgical History:    Active Ambulatory Problems     Diagnosis Date Noted    Hypertension     Hyperlipidemia     Ventral hernia     [facility] bowel obstruction ([facility] )     PVD (peripheral vascular disease) ([facility] )     HARVINDER (acute kidney injury) ([place] [address] ) 02/13/2018    Dementia 02/13/2018    Elevated random blood glucose level 02/13/2018    Renal cyst, left 02/14/2018    Dyslipidemia 09/21/2018    History of CVA (cerebrovascular accident) 09/21/2018    CKD (chronic kidney disease) 11/08/2018     Past Medical History:   Diagnosis Date    CKD (chronic kidney disease) 11/8/2018    Hyperlipidemia     Hypertension     PVD (peripheral vascular disease) ([place] [address] )     Small bowel obstruction (HCC)     Ventral hernia      Admitting Diagnosis: Abdominal pain [R10 9]  Partial small bowel obstruction ([place] [address] ) [K56 600]  Age/Sex: 68 y o  male  Assessment/Plan: Partial small bowel obstruction ([place] [address] )  Assessment & Plan  -admit to [facility]  -NPO  -IVF  -Consult Surgery  -Morphine PRN  -Zofran PRN   CKD (chronic kidney disease)  Assessment & Plan  Stage IV  -stable, cont to monitor   Hypertension  Assessment & Plan  -Hydralazine IV PRN while NPO   VTE Prophylaxis: Enoxaparin (Lovenox)  / sequential compression device   Code Status:  Full code  POLST: POLST form is not discussed and not completed at this time  Discussion with family:  Patient     Anticipated Length of Stay:  Patient will be admitted on an Inpatient basis with an anticipated length of stay of  more than 2 midnights     Justification for Hospital Stay:  Management of partial small bowel obstruction      Admission Orders:  Scheduled Meds:   Current Facility-Administered Medications:  enoxaparin 40 mg Subcutaneous Daily    hydrALAZINE 5 mg Intravenous Q6H PRN    morphine injection 4 mg Intravenous Q4H PRN    morphine injection 2 mg Intravenous Q4H PRN    ondansetron 4 mg Intravenous Q6H PRN      NPO to clear to surgical soft   SCD  Up and OOB as doyle   Acute care sx consult   3/4  BUN creat  23 1 61 This is a 89-Year-old Female PMH Dementia, Parkinson's Disease, Dysphagia, DVT, Muscle Weakness, Gait Instability, Pressure Ulcer, DVT, HTN, hypothyroidism, depression, mood disorder, dementia, decubitus ulcers, sepsis, reflux, insomnia, constipation, HLD who was found hypoxic in SNF. She was treated with one dose of IM lasix and IV Zosyn, O2 initially improved but then dropped again to 80s with respiratory distress. She was transferred to [facility] for more evaluation due to acute respiratory failure. She was found with Co2 retention and acidosis. Patient failed NIPPV attempt and intubated in ED + started on broad IV abx therapy. Patient was admitted to MICU for further evaluation.     Acute hypoxemic respiratory failure due to aspiration pneumonia  - CXR 7/9 with left mid/lower lung predominant airspace opacities, may represent pulmonary edema or pneumonia.  - CT head with no acute intracranial hemorrhage, hydrocephalus or extra-axial fluid collection. mild parenchymal volume loss and mild chronic microvascular ischemic changes  - s/p Vanc (7/9-7/11 ) Zosyn (7/9-7/11) Ceftriaxone (7/11-7/13) Zosyn (7/13-15)  - extubated on 7/15 and transitioned to CPAP now with O2 sat stable on room air   - Speech and swallow was consulted post extubation recommending pureed and moderately thickened liquids, 1:1 assist for all PO intake, crush medications and place in applesauce when feasible    UTI  - 7/9/22 urine culture with 100,000 CFU/ml Escherichia coli   - s/p IV antibiotics noted above.      Sacral decubitus ulcer  - MRSA nasal swab negative.  Wound care consulted recommending:   - Aquacel dressing QD  - BLE elevation  - Moisturize intact skin w/ SWEEN cream BID  - Continue turning and positioning w/ offloading assistive devices as per protocol  - Buttocks/ Sacrum: Mar BID and prn soiling   - Waffle Cushion to chair when oob to chair  - Continue w/ low air loss pressure redistribution bed surface   - Upon discharge f/u as outpatient at [facility] [address] [facility] [phone]    Heart-failure? / fluid overloaded  - BNP 3075 on 7/9  - echo with EF of 55% and mild aortic regurgitation,  paradoxical septal wall motion abnormality due to conduction abnormality  - Treated with IV lasix - transitioned to home lasix 40mg PO     Chronic DVT  - apixaban 5mg q12hrs    Parkinson's disease  - Continue sinemet.    OT was consulted recommending return to Long Term Care facility. Patient appears to be at baseline functional status, no acute OT services required at this time.    Discharge/Dispo/Med rec discussed with attending. Patient medically cleared for discharge with outpatient follow up with PCP This is a 89-Year-old Female PMH Dementia, Parkinson's Disease, Dysphagia, DVT, Muscle Weakness, Gait Instability, Pressure Ulcer, DVT, HTN, hypothyroidism, depression, mood disorder, dementia, decubitus ulcers, sepsis, reflux, insomnia, constipation, HLD who was found hypoxic in SNF. She was treated with one dose of IM lasix and IV Zosyn, O2 initially improved but then dropped again to 80s with respiratory distress. She was transferred to [facility] for more evaluation due to acute respiratory failure. She was found with Co2 retention and acidosis. Patient failed NIPPV attempt and intubated in ED + started on broad IV abx therapy. Patient was admitted to MICU for further evaluation.     Acute hypoxemic respiratory failure due to aspiration pneumonia  - CXR 7/9 with left mid/lower lung predominant airspace opacities, may represent pulmonary edema or pneumonia.  - CT head with no acute intracranial hemorrhage, hydrocephalus or extra-axial fluid collection. mild parenchymal volume loss and mild chronic microvascular ischemic changes  - s/p Vanc (7/9-7/11 ) Zosyn (7/9-7/11) Ceftriaxone (7/11-7/13) Zosyn (7/13-15)  - extubated on 7/15 and transitioned to CPAP now with O2 sat stable on room air   - Speech and swallow was consulted post extubation recommending pureed and moderately thickened liquids, 1:1 assist for all PO intake, crush medications and place in applesauce when feasible    UTI  - 7/9/22 urine culture with 100,000 CFU/ml Escherichia coli   - s/p IV antibiotics noted above.      Sacral decubitus ulcer  - MRSA nasal swab negative.  Wound care consulted recommending:   - Aquacel dressing QD  - BLE elevation  - Moisturize intact skin w/ SWEEN cream BID  - Continue turning and positioning w/ offloading assistive devices as per protocol  - Buttocks/ Sacrum: Mar BID and prn soiling   - Waffle Cushion to chair when oob to chair  - Continue w/ low air loss pressure redistribution bed surface   - Upon discharge f/u as outpatient at [facility] [phone]    Heart-failure? / fluid overloaded  - BNP 3075 on 7/9  - echo with EF of 55% and mild aortic regurgitation,  paradoxical septal wall motion abnormality due to conduction abnormality  - Treated with IV lasix - transitioned to home lasix 40mg PO     Chronic DVT  - apixaban 5mg q12hrs    Parkinson's disease  - Continue sinemet.    OT was consulted recommending return to Long Term Care facility. Patient appears to be at baseline functional status, no acute OT services required at this time.    Discharge/Dispo/Med rec discussed with attending. Patient medically cleared for discharge with outpatient follow up with MD at Facility.

## 2022-07-18 NOTE — PROGRESS NOTE ADULT - ASSESSMENT
89-Year-old Female PMH Dementia, Parkinson's Disease, Dysphagia, DVT, Muscle Weakness, Gait Instability, Pressure Ulcer, DVT, HTN, HYPOTHYROIDISM, DEPRESSION, PARKINSONS, MOOD DISORDER, DEMENTIA, DECUBITUS ULCERS, SEPSIS, REFLUX, INSOMNIA, CONSTIPATION, HLD who was found hypoxic in SNF last night, she was treated with one dose of IM lasix and IV Zosyn, O2 initially improved but then dropped again to 80s with respiratory distress. She was transferred to [facility] for more evaluation due to acute respiratory failure. She was found with Co2 retention and acidosis. She was started on BIPAP and IV broad abx therapy and was admitted in MICU.     hypernatremia- consult nephrology. Started on IVF D5W. Monitor NA level.   Acute hypercapnic / hypoxic respiratory failure - s/p intubation on AC, sedated with Fentanyl transition to Precedex  f/u with icu as primary team.  Patient extubated today 7/15/22, tolerating well on RA. Continue to monitor.   Aspiration pneumonia -resolved . Off of   antibiotics.   HF - ? with elevated proBNP, on lasix 40 mg IV, Echo with normal EF.   palliative - consult palliative care. Patient is still full code.   depression/anxiety- at home was on  Escitalopram 10 mg. F/u psych   weight loss- improved on puree diet with supplementary diet   sacral wound stage 4- Continue Collagen topical powder and calcium alginate with zinc oxide to periwound. F/u wound care team. Offload pressure.  HTN- home meds as Losartan and Amlodipine 10 mg   HLD - home meds as Simvastatin 20 mg on hold   urinary retention - home meds as  urecholine TID on hold   DVT ppx- Eliquis 5 mg BID  Parkinson's - Sinemet 25/100 mg TID  Hypothyroidism- Synthroid to 100 mcg.

## 2022-07-19 LAB
ANION GAP SERPL CALC-SCNC: 18 MMOL/L — HIGH (ref 5–17)
ANION GAP SERPL CALC-SCNC: 21 MMOL/L — HIGH (ref 5–17)
BUN SERPL-MCNC: 49 MG/DL — HIGH (ref 7–23)
BUN SERPL-MCNC: 58 MG/DL — HIGH (ref 7–23)
CALCIUM SERPL-MCNC: 8.3 MG/DL — LOW (ref 8.4–10.5)
CALCIUM SERPL-MCNC: 8.9 MG/DL — SIGNIFICANT CHANGE UP (ref 8.4–10.5)
CHLORIDE SERPL-SCNC: 101 MMOL/L — SIGNIFICANT CHANGE UP (ref 96–108)
CHLORIDE SERPL-SCNC: 96 MMOL/L — SIGNIFICANT CHANGE UP (ref 96–108)
CO2 SERPL-SCNC: 25 MMOL/L — SIGNIFICANT CHANGE UP (ref 22–31)
CO2 SERPL-SCNC: 29 MMOL/L — SIGNIFICANT CHANGE UP (ref 22–31)
CREAT SERPL-MCNC: 1.65 MG/DL — HIGH (ref 0.5–1.3)
CREAT SERPL-MCNC: 2.12 MG/DL — HIGH (ref 0.5–1.3)
EGFR: 22 ML/MIN/1.73M2 — LOW
EGFR: 29 ML/MIN/1.73M2 — LOW
GLUCOSE SERPL-MCNC: 125 MG/DL — HIGH (ref 70–99)
GLUCOSE SERPL-MCNC: 151 MG/DL — HIGH (ref 70–99)
HCT VFR BLD CALC: 34.7 % — SIGNIFICANT CHANGE UP (ref 34.5–45)
HGB BLD-MCNC: 10.7 G/DL — LOW (ref 11.5–15.5)
MAGNESIUM SERPL-MCNC: 2.8 MG/DL — HIGH (ref 1.6–2.6)
MCHC RBC-ENTMCNC: 29.4 PG — SIGNIFICANT CHANGE UP (ref 27–34)
MCHC RBC-ENTMCNC: 30.8 GM/DL — LOW (ref 32–36)
MCV RBC AUTO: 95.3 FL — SIGNIFICANT CHANGE UP (ref 80–100)
NRBC # BLD: 0 /100 WBCS — SIGNIFICANT CHANGE UP (ref 0–0)
PHOSPHATE SERPL-MCNC: 5.9 MG/DL — HIGH (ref 2.5–4.5)
PLATELET # BLD AUTO: 301 K/UL — SIGNIFICANT CHANGE UP (ref 150–400)
POTASSIUM SERPL-MCNC: 3.3 MMOL/L — LOW (ref 3.5–5.3)
POTASSIUM SERPL-MCNC: 4.8 MMOL/L — SIGNIFICANT CHANGE UP (ref 3.5–5.3)
POTASSIUM SERPL-SCNC: 3.3 MMOL/L — LOW (ref 3.5–5.3)
POTASSIUM SERPL-SCNC: 4.8 MMOL/L — SIGNIFICANT CHANGE UP (ref 3.5–5.3)
RBC # BLD: 3.64 M/UL — LOW (ref 3.8–5.2)
RBC # FLD: 14.6 % — HIGH (ref 10.3–14.5)
SODIUM SERPL-SCNC: 139 MMOL/L — SIGNIFICANT CHANGE UP (ref 135–145)
SODIUM SERPL-SCNC: 151 MMOL/L — HIGH (ref 135–145)
WBC # BLD: 8.64 K/UL — SIGNIFICANT CHANGE UP (ref 3.8–10.5)
WBC # FLD AUTO: 8.64 K/UL — SIGNIFICANT CHANGE UP (ref 3.8–10.5)

## 2022-07-19 RX ORDER — POTASSIUM CHLORIDE 20 MEQ
20 PACKET (EA) ORAL ONCE
Refills: 0 | Status: COMPLETED | OUTPATIENT
Start: 2022-07-19 | End: 2022-07-19

## 2022-07-19 RX ORDER — SODIUM CHLORIDE 9 MG/ML
1000 INJECTION, SOLUTION INTRAVENOUS
Refills: 0 | Status: DISCONTINUED | OUTPATIENT
Start: 2022-07-19 | End: 2022-07-19

## 2022-07-19 RX ORDER — SODIUM CHLORIDE 9 MG/ML
1000 INJECTION, SOLUTION INTRAVENOUS
Refills: 0 | Status: DISCONTINUED | OUTPATIENT
Start: 2022-07-19 | End: 2022-07-20

## 2022-07-19 RX ORDER — POTASSIUM CHLORIDE 20 MEQ
20 PACKET (EA) ORAL ONCE
Refills: 0 | Status: DISCONTINUED | OUTPATIENT
Start: 2022-07-19 | End: 2022-07-19

## 2022-07-19 RX ADMIN — Medication 1 TABLET(S): at 11:53

## 2022-07-19 RX ADMIN — AMLODIPINE BESYLATE 10 MILLIGRAM(S): 2.5 TABLET ORAL at 05:04

## 2022-07-19 RX ADMIN — CARBIDOPA AND LEVODOPA 1 TABLET(S): 25; 100 TABLET ORAL at 05:03

## 2022-07-19 RX ADMIN — Medication 100 MICROGRAM(S): at 05:04

## 2022-07-19 RX ADMIN — APIXABAN 5 MILLIGRAM(S): 2.5 TABLET, FILM COATED ORAL at 05:04

## 2022-07-19 RX ADMIN — Medication 3 MILLILITER(S): at 17:18

## 2022-07-19 RX ADMIN — Medication 25 MILLIGRAM(S): at 21:53

## 2022-07-19 RX ADMIN — Medication 3 MILLILITER(S): at 05:03

## 2022-07-19 RX ADMIN — CARBIDOPA AND LEVODOPA 1 TABLET(S): 25; 100 TABLET ORAL at 13:34

## 2022-07-19 RX ADMIN — SODIUM CHLORIDE 50 MILLILITER(S): 9 INJECTION, SOLUTION INTRAVENOUS at 07:41

## 2022-07-19 RX ADMIN — APIXABAN 5 MILLIGRAM(S): 2.5 TABLET, FILM COATED ORAL at 17:18

## 2022-07-19 RX ADMIN — CHLORHEXIDINE GLUCONATE 1 APPLICATION(S): 213 SOLUTION TOPICAL at 05:07

## 2022-07-19 RX ADMIN — Medication 500 MILLIGRAM(S): at 11:53

## 2022-07-19 RX ADMIN — Medication 3 MILLILITER(S): at 11:53

## 2022-07-19 RX ADMIN — Medication 20 MILLIEQUIVALENT(S): at 10:08

## 2022-07-19 RX ADMIN — SODIUM CHLORIDE 70 MILLILITER(S): 9 INJECTION, SOLUTION INTRAVENOUS at 09:58

## 2022-07-19 RX ADMIN — Medication 40 MILLIGRAM(S): at 05:04

## 2022-07-19 RX ADMIN — LOSARTAN POTASSIUM 50 MILLIGRAM(S): 100 TABLET, FILM COATED ORAL at 05:03

## 2022-07-19 RX ADMIN — Medication 25 MILLIGRAM(S): at 05:03

## 2022-07-19 RX ADMIN — CARBIDOPA AND LEVODOPA 1 TABLET(S): 25; 100 TABLET ORAL at 21:53

## 2022-07-19 RX ADMIN — Medication 25 MILLIGRAM(S): at 13:34

## 2022-07-19 NOTE — PROGRESS NOTE ADULT - ASSESSMENT
89-Year-old Female PMH Dementia, Parkinson's Disease, Dysphagia, DVT, Muscle Weakness, Gait Instability, Pressure Ulcer, DVT, HTN, HYPOTHYROIDISM, DEPRESSION, PARKINSONS, MOOD DISORDER, DEMENTIA, DECUBITUS ULCERS, SEPSIS, REFLUX, INSOMNIA, CONSTIPATION, HLD who was found hypoxic in SNF last night, she was treated with one dose of IM lasix and IV Zosyn, O2 initially improved but then dropped again to 80s with respiratory distress. She was transferred to [facility] for more evaluation due to acute respiratory failure. She was found with Co2 retention and acidosis. She was started on BIPAP and IV broad abx therapy and was admitted in MICU.     hypernatremia- NA still rising.  IVF D5W increased to 70 cc for 2 L. Monitor NA level.  F/u nephrology.   Acute hypercapnic / hypoxic respiratory failure - s/p intubation on AC, sedated with Fentanyl transition to Precedex  f/u with icu as primary team.  Patient extubated today 7/15/22, tolerating well on RA. Continue to monitor.   Aspiration pneumonia -resolved . Off of   antibiotics.   HF - ? with elevated proBNP, Echo with normal EF. Tapered Lasix to 20 mg due to  worsening Cr.   palliative - consult palliative care. Patient is still full code.   depression/anxiety- at home was on  Escitalopram 10 mg. F/u psych   weight loss- improved on puree diet with supplementary diet   sacral wound stage 4- Continue Collagen topical powder and calcium alginate with zinc oxide to periwound. F/u wound care team. Offload pressure.  HTN- home med Amlodipine 10 mg . D/ C Losartan, due to worsening Cr.   HLD - home meds as Simvastatin 20 mg on hold   urinary retention - home meds as  urecholine TID on hold   DVT ppx- Eliquis 5 mg BID  Parkinson's - Sinemet 25/100 mg TID  Hypothyroidism- Synthroid to 100 mcg.

## 2022-07-19 NOTE — PROGRESS NOTE ADULT - ASSESSMENT
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 Limited history, obtained through paperwork from nursing home. Patient was suspected to have bilateral pneumonia based on chest X-ray at nursing home yesterday 7/8. On initial presentation patient was afebrile, had non labored breathing on 6L nasal canula, and was AOx1. In the ED, patient was found to be hypercapnic with a PCO2 of 104 and pH of 7.19 off a VBG. Patient was placed on avaps; however she continued to be hypercapnic and acidotic with repeat VBG showing PCO2 of 117 and pH of 7.09. At this time, patient was intubated.      hypernatremia  increase water start d5w 50 cc per hr   will check ua , urine osmolality , urine sodium , urine uric acid , serum sodium , serum osmolality , serum uric acid , f/u with hyponatremia work up , f/u with bmp , monitor i and o      BP monitoring,continue current antihypertensive meds, low salt diet,followup with PMD in 1-2 weeks  losartan 50 mg oral tablet: 1 tab(s) orally once a day  metoprolol succinate 50 mg oral tablet, extended release: 1 tab(s) orally once a day    89 year old female with PMH of Parkinson's disease, HTN, chronic DVT on Eliquis, and hypothyroidism who was brought in by EMS for shortness of breath. Limited history, obtained through paperwork from nursing home. Patient was suspected to have bilateral pneumonia based on chest X-ray at nursing home yesterday 7/8. On initial presentation patient was afebrile, had non labored breathing on 6L nasal canula, and was AOx1. In the ED, patient was found to be hypercapnic with a PCO2 of 104 and pH of 7.19 off a VBG. Patient was placed on avaps; however she continued to be hypercapnic and acidotic with repeat VBG showing PCO2 of 117 and pH of 7.09. At this time, patient was intubated.      hypernatremia  increase water start d5w 75  cc per hr x 24 hr   will check ua , urine osmolality , urine sodium , urine uric acid , serum sodium , serum osmolality , serum uric acid , f/u with hyponatremia work up , f/u with bmp , monitor i and o      ACUTE RENAL FAILURE:   Serum creatinine is  at     , approximating GFR at   ml/min.   There is no progression . No uremic symptoms  No evidence of anemia .  Fluid status stable.  Will continue to avoid nephrotoxic drugs.  Patient remains asymptomatic.   Continue current therapy.  hold  diuretic.  hold   ACE inhibitor.  hold   ARB.  Additional evaluation:   ECG,    echocardiogram,     CXR,  will obtained recent   renal ultrasound to evalaute kidney size and possible stones , if cr is not improving             BP monitoring,continue current antihypertensive meds, low salt diet,followup with PMD in 1-2 weeks  metoprolol succinate 50 mg oral tablet, extended release: 1 tab(s) orally once a day

## 2022-07-19 NOTE — PROGRESS NOTE ADULT - SUBJECTIVE AND OBJECTIVE BOX
Patient is a 89y Female whom presented to the hospital with hypernatremia      PAST MEDICAL & SURGICAL HISTORY:  HTN (hypertension)      Parkinsons      HLD (hyperlipidemia)      DVT, lower extremity      Hypothyroid      No significant past surgical history          MEDICATIONS  (STANDING):  ALBUTerol    90 MICROgram(s) HFA Inhaler      albuterol/ipratropium for Nebulization 3 milliLiter(s) Nebulizer every 6 hours  amLODIPine   Tablet 10 milliGRAM(s) Oral daily  apixaban 5 milliGRAM(s) Oral every 12 hours  ascorbic acid 500 milliGRAM(s) Oral daily  bethanechol 25 milliGRAM(s) Oral three times a day  carbidopa/levodopa  25/100 1 Tablet(s) Oral three times a day  chlorhexidine 4% Liquid 1 Application(s) Topical <User Schedule>  dextrose 5%. 1000 milliLiter(s) (50 mL/Hr) IV Continuous <Continuous>  furosemide    Tablet 40 milliGRAM(s) Oral daily  levothyroxine 100 MICROGram(s) Oral daily  losartan 50 milliGRAM(s) Oral daily  metolazone 5 milliGRAM(s) Oral daily  multivitamin 1 Tablet(s) Oral daily      Allergies    No Known Allergies    Intolerances  [facility]      SOCIAL HISTORY:  Denies ETOh,Smoking,     FAMILY HISTORY:      REVIEW OF SYSTEMS:    CONSTITUTIONAL: No weakness, fevers or chills  RESPIRATORY: No cough, wheezing, hemoptysis; No shortness of breath  CARDIOVASCULAR: No chest pain or palpitations  GASTROINTESTINAL: No abdominal or epigastric pain. No nausea, vomiting,   GENITOURINARY: No dysuria, frequency or hematuria  SKIN: dry                            10.7   8.64  )-----------( 301      ( 19 Jul 2022 07:41 )             34.7       CBC Full  -  ( 19 Jul 2022 07:41 )  WBC Count : 8.64 K/uL  RBC Count : 3.64 M/uL  Hemoglobin : 10.7 g/dL  Hematocrit : 34.7 %  Platelet Count - Automated : 301 K/uL  Mean Cell Volume : 95.3 fl  Mean Cell Hemoglobin : 29.4 pg  Mean Cell Hemoglobin Concentration : 30.8 gm/dL  Auto Neutrophil # : x  Auto Lymphocyte # : x  Auto Monocyte # : x  Auto Eosinophil # : x  Auto Basophil # : x  Auto Neutrophil % : x  Auto Lymphocyte % : x  Auto Monocyte % : x  Auto Eosinophil % : x  Auto Basophil % : x      07-19    151<H>  |  101  |  49<H>  ----------------------------<  125<H>  3.3<L>   |  29  |  1.65<H>    Ca    8.9      19 Jul 2022 07:31  Phos  5.9     07-19  Mg     2.8     07-19        CAPILLARY BLOOD GLUCOSE          Vital Signs Last 24 Hrs  T(C): 37.2 (19 Jul 2022 16:10), Max: 37.2 (18 Jul 2022 19:18)  T(F): 99 (19 Jul 2022 16:10), Max: 99 (18 Jul 2022 19:18)  HR: 70 (19 Jul 2022 16:10) (69 - 74)  BP: 133/65 (19 Jul 2022 16:10) (113/65 - 139/71)  BP(mean): --  RR: 18 (19 Jul 2022 16:10) (17 - 18)  SpO2: 96% (19 Jul 2022 16:10) (93% - 96%)    Parameters below as of 19 Jul 2022 16:10  Patient On (Oxygen Delivery Method): room air                  PHYSICAL EXAM:    Constitutional: NAD  HEENT: conjunctive   clear   Neck:  No JVD  Respiratory: CTAB  Cardiovascular: S1 and S2  Gastrointestinal: BS+, soft, NT/ND  Extremities: No peripheral edema

## 2022-07-19 NOTE — CHART NOTE - NSCHARTNOTEFT_GEN_A_CORE
Discussed BMP results from this AM with attending Dr. Huang. Per attending, increase IVF D5 from 50cc/hour to 70cc/hour, decrease Lasix 40mg PO qd to 20mg PO qd. and hold losartan 50mg POqd for two days given rise in Cr. Discussed plan above with nephrologist Dr. Head - agrees with plan. Will continue to trend BMP and monitor patient for signs of volume overload - RN informed of plan of care.     Cecy Syed PA-C  Dept of Medicine   [id number]

## 2022-07-19 NOTE — PROGRESS NOTE ADULT - SUBJECTIVE AND OBJECTIVE BOX
Patient is a 89y old  Female who presents with a chief complaint of Shortness of breath (19 Jul 2022 15:29)      INTERVAL HPI/OVERNIGHT EVENTS: NA still rising. Increased IVF D5W to 70 cc for 2 L. Creatinine is worse. D/c Losartan. Tapered Lasix to 20 mg. F/u nephrology.     Pain Location & Control: OK    MEDICATIONS  (STANDING):  ALBUTerol    90 MICROgram(s) HFA Inhaler      albuterol/ipratropium for Nebulization 3 milliLiter(s) Nebulizer every 6 hours  amLODIPine   Tablet 10 milliGRAM(s) Oral daily  apixaban 5 milliGRAM(s) Oral every 12 hours  ascorbic acid 500 milliGRAM(s) Oral daily  bethanechol 25 milliGRAM(s) Oral three times a day  carbidopa/levodopa  25/100 1 Tablet(s) Oral three times a day  chlorhexidine 4% Liquid 1 Application(s) Topical <User Schedule>  dextrose 5%. 1000 milliLiter(s) (75 mL/Hr) IV Continuous <Continuous>  levothyroxine 100 MICROGram(s) Oral daily  metolazone 5 milliGRAM(s) Oral daily  multivitamin 1 Tablet(s) Oral daily    MEDICATIONS  (PRN):  acetaminophen     Tablet .. 650 milliGRAM(s) Oral every 6 hours PRN Temp greater or equal to 38C (100.4F), Mild Pain (1 - 3)  melatonin 3 milliGRAM(s) Oral at bedtime PRN Insomnia      Allergies    No Known Allergies    Intolerances        REVIEW OF SYSTEMS:  CONSTITUTIONAL: No fever, weight loss, or fatigue  EYES: No eye pain, visual disturbances, or discharge  ENMT:  No difficulty hearing, tinnitus, vertigo; No sinus or throat pain  NECK: No pain or stiffness  BREASTS: No pain, masses, or nipple discharge  RESPIRATORY: No cough, wheezing, chills or hemoptysis; No shortness of breath  CARDIOVASCULAR: No chest pain, palpitations, dizziness, or leg swelling  GASTROINTESTINAL: No abdominal or epigastric pain. No nausea, vomiting, or hematemesis; No diarrhea or constipation. No melena or hematochezia.  GENITOURINARY: No dysuria, frequency, hematuria, or incontinence  NEUROLOGICAL: No headaches, memory loss, loss of strength, numbness, or tremors  SKIN: No itching, burning, rashes, or lesions   LYMPH NODES: No enlarged glands  ENDOCRINE: No heat or cold intolerance; No hair loss; No polydipsia or polyuria  MUSCULOSKELETAL: No back pain  PSYCHIATRIC: No depression, anxiety, mood swings, or difficulty sleeping  HEME/LYMPH: No easy bruising, or bleeding gums  ALLERGY AND IMMUNOLOGIC: No hives or eczema    Vital Signs Last 24 Hrs  T(C): 37.2 (19 Jul 2022 16:10), Max: 37.2 (19 Jul 2022 00:03)  T(F): 99 (19 Jul 2022 16:10), Max: 99 (19 Jul 2022 00:03)  HR: 70 (19 Jul 2022 16:10) (69 - 74)  BP: 133/65 (19 Jul 2022 16:10) (113/65 - 139/71)  BP(mean): --  RR: 18 (19 Jul 2022 16:10) (17 - 18)  SpO2: 96% (19 Jul 2022 16:10) (93% - 96%)    Parameters below as of 19 Jul 2022 16:10  Patient On (Oxygen Delivery Method): room air        PHYSICAL EXAM:  GENERAL: NAD, well-groomed, well-developed  HEAD:  Atraumatic, Normocephalic  EYES: EOMI, PERRLA, conjunctiva and sclera clear  ENMT: No tonsillar erythema, exudates, or enlargement; Moist mucous membranes, Good dentition, No lesions  NECK: Supple, No JVD, Normal thyroid  NERVOUS SYSTEM:  Alert & Oriented X3, Good concentration; Motor Strength 5/5 B/L upper and lower extremities; DTRs 2+ intact and symmetric  CHEST/LUNG: Clear to auscultation bilaterally; No rales, rhonchi, wheezing, or rubs  HEART: Regular rate and rhythm; No murmurs, rubs, or gallops  ABDOMEN: Soft, Nontender, Nondistended; Bowel sounds present  EXTREMITIES:  2+ Peripheral Pulses, No clubbing or cyanosis  LYMPH: No lymphadenopathy noted  SKIN: No rashes or lesions    LABS:                        10.7   8.64  )-----------( 301      ( 19 Jul 2022 07:41 )             34.7     19 Jul 2022 07:31    151    |  101    |  49     ----------------------------<  125    3.3     |  29     |  1.65     Ca    8.9        19 Jul 2022 07:31  Phos  5.9       19 Jul 2022 07:31  Mg     2.8       19 Jul 2022 07:31          CAPILLARY BLOOD GLUCOSE            Cultures  Culture Results:   No Growth Final (07-13-22 @ 00:29)  Culture Results:   No Growth Final (07-13-22 @ 00:29)      RADIOLOGY & ADDITIONAL TESTS:    Imaging Personally Reviewed:  [X ] YES  [ ] NO    Consultant(s) Notes Reviewed:  [X ] YES  [ ] NO    Care Discussed with Consultants/Other Providers [X ] YES  [ ] NO

## 2022-07-20 LAB
ANION GAP SERPL CALC-SCNC: 14 MMOL/L — SIGNIFICANT CHANGE UP (ref 5–17)
ANION GAP SERPL CALC-SCNC: 16 MMOL/L — SIGNIFICANT CHANGE UP (ref 5–17)
BUN SERPL-MCNC: 58 MG/DL — HIGH (ref 7–23)
BUN SERPL-MCNC: 58 MG/DL — HIGH (ref 7–23)
CALCIUM SERPL-MCNC: 8.4 MG/DL — SIGNIFICANT CHANGE UP (ref 8.4–10.5)
CALCIUM SERPL-MCNC: 8.6 MG/DL — SIGNIFICANT CHANGE UP (ref 8.4–10.5)
CHLORIDE SERPL-SCNC: 97 MMOL/L — SIGNIFICANT CHANGE UP (ref 96–108)
CHLORIDE SERPL-SCNC: 98 MMOL/L — SIGNIFICANT CHANGE UP (ref 96–108)
CO2 SERPL-SCNC: 26 MMOL/L — SIGNIFICANT CHANGE UP (ref 22–31)
CO2 SERPL-SCNC: 29 MMOL/L — SIGNIFICANT CHANGE UP (ref 22–31)
CREAT SERPL-MCNC: 2.11 MG/DL — HIGH (ref 0.5–1.3)
CREAT SERPL-MCNC: 2.18 MG/DL — HIGH (ref 0.5–1.3)
EGFR: 21 ML/MIN/1.73M2 — LOW
EGFR: 22 ML/MIN/1.73M2 — LOW
GLUCOSE SERPL-MCNC: 111 MG/DL — HIGH (ref 70–99)
GLUCOSE SERPL-MCNC: 93 MG/DL — SIGNIFICANT CHANGE UP (ref 70–99)
HCT VFR BLD CALC: 34.4 % — LOW (ref 34.5–45)
HGB BLD-MCNC: 10.8 G/DL — LOW (ref 11.5–15.5)
MAGNESIUM SERPL-MCNC: 2.8 MG/DL — HIGH (ref 1.6–2.6)
MCHC RBC-ENTMCNC: 29.6 PG — SIGNIFICANT CHANGE UP (ref 27–34)
MCHC RBC-ENTMCNC: 31.4 GM/DL — LOW (ref 32–36)
MCV RBC AUTO: 94.2 FL — SIGNIFICANT CHANGE UP (ref 80–100)
NRBC # BLD: 0 /100 WBCS — SIGNIFICANT CHANGE UP (ref 0–0)
PHOSPHATE SERPL-MCNC: 6.5 MG/DL — HIGH (ref 2.5–4.5)
PLATELET # BLD AUTO: 287 K/UL — SIGNIFICANT CHANGE UP (ref 150–400)
POTASSIUM SERPL-MCNC: 3.3 MMOL/L — LOW (ref 3.5–5.3)
POTASSIUM SERPL-MCNC: 3.5 MMOL/L — SIGNIFICANT CHANGE UP (ref 3.5–5.3)
POTASSIUM SERPL-SCNC: 3.3 MMOL/L — LOW (ref 3.5–5.3)
POTASSIUM SERPL-SCNC: 3.5 MMOL/L — SIGNIFICANT CHANGE UP (ref 3.5–5.3)
RBC # BLD: 3.65 M/UL — LOW (ref 3.8–5.2)
RBC # FLD: 14.6 % — HIGH (ref 10.3–14.5)
SODIUM SERPL-SCNC: 140 MMOL/L — SIGNIFICANT CHANGE UP (ref 135–145)
SODIUM SERPL-SCNC: 140 MMOL/L — SIGNIFICANT CHANGE UP (ref 135–145)
WBC # BLD: 11.49 K/UL — HIGH (ref 3.8–10.5)
WBC # FLD AUTO: 11.49 K/UL — HIGH (ref 3.8–10.5)

## 2022-07-20 PROCEDURE — 76775 US EXAM ABDO BACK WALL LIM: CPT | Mod: 26

## 2022-07-20 RX ORDER — AMLODIPINE BESYLATE 2.5 MG/1
5 TABLET ORAL DAILY
Refills: 0 | Status: DISCONTINUED | OUTPATIENT
Start: 2022-07-21 | End: 2022-07-23

## 2022-07-20 RX ORDER — POTASSIUM CHLORIDE 20 MEQ
20 PACKET (EA) ORAL ONCE
Refills: 0 | Status: COMPLETED | OUTPATIENT
Start: 2022-07-20 | End: 2022-07-20

## 2022-07-20 RX ADMIN — APIXABAN 5 MILLIGRAM(S): 2.5 TABLET, FILM COATED ORAL at 05:17

## 2022-07-20 RX ADMIN — AMLODIPINE BESYLATE 10 MILLIGRAM(S): 2.5 TABLET ORAL at 05:17

## 2022-07-20 RX ADMIN — Medication 3 MILLILITER(S): at 06:03

## 2022-07-20 RX ADMIN — Medication 100 MICROGRAM(S): at 05:17

## 2022-07-20 RX ADMIN — Medication 3 MILLILITER(S): at 17:49

## 2022-07-20 RX ADMIN — CHLORHEXIDINE GLUCONATE 1 APPLICATION(S): 213 SOLUTION TOPICAL at 05:18

## 2022-07-20 RX ADMIN — Medication 20 MILLIEQUIVALENT(S): at 09:37

## 2022-07-20 RX ADMIN — APIXABAN 5 MILLIGRAM(S): 2.5 TABLET, FILM COATED ORAL at 17:49

## 2022-07-20 RX ADMIN — Medication 3 MILLILITER(S): at 05:18

## 2022-07-20 RX ADMIN — Medication 25 MILLIGRAM(S): at 05:17

## 2022-07-20 RX ADMIN — CARBIDOPA AND LEVODOPA 1 TABLET(S): 25; 100 TABLET ORAL at 14:08

## 2022-07-20 RX ADMIN — CARBIDOPA AND LEVODOPA 1 TABLET(S): 25; 100 TABLET ORAL at 05:17

## 2022-07-20 RX ADMIN — Medication 25 MILLIGRAM(S): at 14:09

## 2022-07-20 RX ADMIN — Medication 500 MILLIGRAM(S): at 12:40

## 2022-07-20 RX ADMIN — Medication 25 MILLIGRAM(S): at 21:19

## 2022-07-20 RX ADMIN — CARBIDOPA AND LEVODOPA 1 TABLET(S): 25; 100 TABLET ORAL at 21:18

## 2022-07-20 RX ADMIN — Medication 1 TABLET(S): at 12:40

## 2022-07-20 RX ADMIN — Medication 3 MILLILITER(S): at 12:40

## 2022-07-20 NOTE — CONSULT NOTE ADULT - ASSESSMENT
89 y/o female pt with bilat heel DTIs   - pt seen and evaluated  - bilat foot DTIs with no signs of infection noted  - rec painting left foot blister with betadine daily, and right heel with cavilon daily  - rec z flow boots in bed at all times  - will monitor periodically for signs of progression, reconsult sooner as needed

## 2022-07-20 NOTE — PROGRESS NOTE ADULT - ASSESSMENT
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 Limited history, obtained through paperwork from nursing home. Patient was suspected to have bilateral pneumonia based on chest X-ray at nursing home yesterday 7/8. On initial presentation patient was afebrile, had non labored breathing on 6L nasal canula, and was AOx1. In the ED, patient was found to be hypercapnic with a PCO2 of 104 and pH of 7.19 off a VBG. Patient was placed on avaps; however she continued to be hypercapnic and acidotic with repeat VBG showing PCO2 of 117 and pH of 7.09. At this time, patient was intubated.      hypernatremia  improved   will check ua , urine osmolality , urine sodium , urine uric acid , serum sodium , serum osmolality , serum uric acid , f/u with hyponatremia work up , f/u with bmp , monitor i and o      ACUTE RENAL FAILURE: due to diuresis , hypotension hypoperfusion non oliguric atn   Serum creatinine is  2.18   There is  progression . No uremic symptoms  No evidence of anemia .  Fluid status stable.  Will continue to avoid nephrotoxic drugs.  Patient remains asymptomatic.   Continue current therapy.  hold  diuretic.  hold   ACE inhibitor.  hold   ARB.  Additional evaluation:   ECG,    echocardiogram,     CXR,  will obtained recent   renal ultrasound to evalaute kidney size and possible stones , if cr is not improving             BP monitoring  decrease norvasc 5 mg po qd

## 2022-07-20 NOTE — CHART NOTE - NSCHARTNOTEFT_GEN_A_CORE
Interval Hx: Patient with hypernatremia, value of 151 earlier today. Started on D5 at 70cc/hr.    Evening BMP obtained with sodium of 139. Rpt BMP obtained to ensure accuracy, sodium level of 140.  Will discontinue patient's IVF as pt is no longer hypernatremic.  Day team, attending, and nephrology to follow in AM.    -Abena Zuñiga PA-C, [id number], Dept of Medicine

## 2022-07-20 NOTE — PROGRESS NOTE ADULT - ASSESSMENT
89-Year-old Female PMH Dementia, Parkinson's Disease, Dysphagia, DVT, Muscle Weakness, Gait Instability, Pressure Ulcer, DVT, HTN, HYPOTHYROIDISM, DEPRESSION, PARKINSONS, MOOD DISORDER, DEMENTIA, DECUBITUS ULCERS, SEPSIS, REFLUX, INSOMNIA, CONSTIPATION, HLD who was found hypoxic in SNF last night, she was treated with one dose of IM lasix and IV Zosyn, O2 initially improved but then dropped again to 80s with respiratory distress. She was transferred to [facility] for more evaluation due to acute respiratory failure. She was found with Co2 retention and acidosis. She was started on BIPAP and IV broad abx therapy and was admitted in MICU.     hypernatremia- resolved with IVF D5W. F/u nephrology.   elevate Cre- worse. Off of Lasix and ACEI/ARBS.  Acute hypercapnic / hypoxic respiratory failure - s/p intubation on AC, sedated with Fentanyl transition to Precedex  f/u with icu as primary team.  Patient extubated today 7/15/22, tolerating well on RA. Continue to monitor.   Aspiration pneumonia -resolved . Off of   antibiotics.   HF - ? with elevated proBNP, Echo with normal EF. Off of Lasix  due to  worsening Cr.   palliative - consult palliative care. Patient is still full code.   depression/anxiety- at home was on  Escitalopram 10 mg. F/u psych   weight loss- improved on puree diet with supplementary diet   sacral wound stage 4- Continue Collagen topical powder and calcium alginate with zinc oxide to periwound. F/u wound care team. Offload pressure.  HTN- home med Amlodipine 10 mg . D/ C Losartan, due to worsening Cr.   HLD - home meds as Simvastatin 20 mg on hold   urinary retention - home meds as  urecholine TID on hold   DVT ppx- Eliquis 5 mg BID  Parkinson's - Sinemet 25/100 mg TID  Hypothyroidism- Synthroid to 100 mcg.

## 2022-07-20 NOTE — CHART NOTE - NSCHARTNOTEFT_GEN_A_CORE
Discussed BMP results from this AM (7/20) with nephrologist Dr. Head. Per nephrologist - continue to observe patient off of D5 IVF and continue to hold Lasix. Given rising Creatinine levels - will check US kidney to r/o obstruction and decrease amlodipine to 5mg qd with holding parameters per nephrologist.      Ccey Syed PA-C  Dept of Medicine   [id number]

## 2022-07-20 NOTE — PROGRESS NOTE ADULT - SUBJECTIVE AND OBJECTIVE BOX
Patient is a 89y Female whom presented to the hospital with hypernatremia      PAST MEDICAL & SURGICAL HISTORY:  HTN (hypertension)      Parkinsons      HLD (hyperlipidemia)      DVT, lower extremity      Hypothyroid      No significant past surgical history          MEDICATIONS  (STANDING):  ALBUTerol    90 MICROgram(s) HFA Inhaler      albuterol/ipratropium for Nebulization 3 milliLiter(s) Nebulizer every 6 hours  amLODIPine   Tablet 10 milliGRAM(s) Oral daily  apixaban 5 milliGRAM(s) Oral every 12 hours  ascorbic acid 500 milliGRAM(s) Oral daily  bethanechol 25 milliGRAM(s) Oral three times a day  carbidopa/levodopa  25/100 1 Tablet(s) Oral three times a day  chlorhexidine 4% Liquid 1 Application(s) Topical <User Schedule>  dextrose 5%. 1000 milliLiter(s) (50 mL/Hr) IV Continuous <Continuous>  furosemide    Tablet 40 milliGRAM(s) Oral daily  levothyroxine 100 MICROGram(s) Oral daily  losartan 50 milliGRAM(s) Oral daily  metolazone 5 milliGRAM(s) Oral daily  multivitamin 1 Tablet(s) Oral daily      Allergies    No Known Allergies    Intolerances  [facility]      SOCIAL HISTORY:  Denies ETOh,Smoking,     FAMILY HISTORY:      REVIEW OF SYSTEMS:    CONSTITUTIONAL: No weakness, fevers or chills  RESPIRATORY: No cough, wheezing, hemoptysis; No shortness of breath  CARDIOVASCULAR: No chest pain or palpitations  GASTROINTESTINAL: No abdominal or epigastric pain. No nausea, vomiting,   GENITOURINARY: No dysuria, frequency or hematuria  SKIN: dry                                                10.8   11.49 )-----------( 287      ( 20 Jul 2022 07:08 )             34.4       CBC Full  -  ( 20 Jul 2022 07:08 )  WBC Count : 11.49 K/uL  RBC Count : 3.65 M/uL  Hemoglobin : 10.8 g/dL  Hematocrit : 34.4 %  Platelet Count - Automated : 287 K/uL  Mean Cell Volume : 94.2 fl  Mean Cell Hemoglobin : 29.6 pg  Mean Cell Hemoglobin Concentration : 31.4 gm/dL  Auto Neutrophil # : x  Auto Lymphocyte # : x  Auto Monocyte # : x  Auto Eosinophil # : x  Auto Basophil # : x  Auto Neutrophil % : x  Auto Lymphocyte % : x  Auto Monocyte % : x  Auto Eosinophil % : x  Auto Basophil % : x      07-20    140  |  98  |  58<H>  ----------------------------<  93  3.3<L>   |  26  |  2.18<H>    Ca    8.6      20 Jul 2022 07:07  Phos  6.5     07-20  Mg     2.8     07-20        CAPILLARY BLOOD GLUCOSE          Vital Signs Last 24 Hrs  T(C): 37.6 (20 Jul 2022 13:26), Max: 37.6 (20 Jul 2022 13:26)  T(F): 99.6 (20 Jul 2022 13:26), Max: 99.6 (20 Jul 2022 13:26)  HR: 99 (20 Jul 2022 13:26) (77 - 99)  BP: 123/88 (20 Jul 2022 13:26) (104/56 - 123/88)  BP(mean): --  RR: 18 (20 Jul 2022 13:26) (18 - 18)  SpO2: 95% (20 Jul 2022 13:26) (92% - 95%)    Parameters below as of 20 Jul 2022 13:26  Patient On (Oxygen Delivery Method): room air                          PHYSICAL EXAM:    Constitutional: NAD  HEENT: conjunctive   clear   Neck:  No JVD  Respiratory: CTAB  Cardiovascular: S1 and S2  Gastrointestinal: BS+, soft, NT/ND  Extremities: No peripheral edema

## 2022-07-20 NOTE — PROGRESS NOTE ADULT - SUBJECTIVE AND OBJECTIVE BOX
Patient is a 89y old  Female who presents with a chief complaint of Shortness of breath (20 Jul 2022 17:38)      INTERVAL HPI/OVERNIGHT EVENTS: Hypernatremia resolved with D5 IVF. Cre worse. Off of Lasix and ACEI/ARBS.      Pain Location & Control: OK    MEDICATIONS  (STANDING):  ALBUTerol    90 MICROgram(s) HFA Inhaler      albuterol/ipratropium for Nebulization 3 milliLiter(s) Nebulizer every 6 hours  apixaban 5 milliGRAM(s) Oral every 12 hours  ascorbic acid 500 milliGRAM(s) Oral daily  bethanechol 25 milliGRAM(s) Oral three times a day  carbidopa/levodopa  25/100 1 Tablet(s) Oral three times a day  chlorhexidine 4% Liquid 1 Application(s) Topical <User Schedule>  levothyroxine 100 MICROGram(s) Oral daily  metolazone 5 milliGRAM(s) Oral daily  multivitamin 1 Tablet(s) Oral daily    MEDICATIONS  (PRN):  acetaminophen     Tablet .. 650 milliGRAM(s) Oral every 6 hours PRN Temp greater or equal to 38C (100.4F), Mild Pain (1 - 3)  melatonin 3 milliGRAM(s) Oral at bedtime PRN Insomnia      Allergies    No Known Allergies    Intolerances        REVIEW OF SYSTEMS:  CONSTITUTIONAL: No fever, weight loss, or fatigue  EYES: No eye pain, visual disturbances, or discharge  ENMT:  No difficulty hearing, tinnitus, vertigo; No sinus or throat pain  NECK: No pain or stiffness  BREASTS: No pain, masses, or nipple discharge  RESPIRATORY: No cough, wheezing, chills or hemoptysis; No shortness of breath  CARDIOVASCULAR: No chest pain, palpitations, dizziness, or leg swelling  GASTROINTESTINAL: No abdominal or epigastric pain. No nausea, vomiting, or hematemesis; No diarrhea or constipation. No melena or hematochezia.  GENITOURINARY: No dysuria, frequency, hematuria, or incontinence  NEUROLOGICAL: No headaches, memory loss, loss of strength, numbness, or tremors  SKIN: No itching, burning, rashes, or lesions   LYMPH NODES: No enlarged glands  ENDOCRINE: No heat or cold intolerance; No hair loss; No polydipsia or polyuria  MUSCULOSKELETAL: No back pain  PSYCHIATRIC: No depression, anxiety, mood swings, or difficulty sleeping  HEME/LYMPH: No easy bruising, or bleeding gums  ALLERGY AND IMMUNOLOGIC: No hives or eczema    Vital Signs Last 24 Hrs  T(C): 36.6 (20 Jul 2022 20:37), Max: 37.6 (20 Jul 2022 13:26)  T(F): 97.8 (20 Jul 2022 20:37), Max: 99.6 (20 Jul 2022 13:26)  HR: 88 (20 Jul 2022 20:37) (77 - 99)  BP: 144/67 (20 Jul 2022 20:37) (104/56 - 144/67)  BP(mean): --  RR: 18 (20 Jul 2022 20:37) (18 - 18)  SpO2: 92% (20 Jul 2022 20:37) (92% - 95%)    Parameters below as of 20 Jul 2022 20:37  Patient On (Oxygen Delivery Method): room air        PHYSICAL EXAM:  GENERAL: NAD, well-groomed, well-developed  HEAD:  Atraumatic, Normocephalic  EYES: EOMI, PERRLA, conjunctiva and sclera clear  ENMT: No tonsillar erythema, exudates, or enlargement; Moist mucous membranes, Good dentition, No lesions  NECK: Supple, No JVD, Normal thyroid  NERVOUS SYSTEM:  Alert & Oriented X3, Good concentration; Motor Strength 5/5 B/L upper and lower extremities; DTRs 2+ intact and symmetric  CHEST/LUNG: Clear to auscultation bilaterally; No rales, rhonchi, wheezing, or rubs  HEART: Regular rate and rhythm; No murmurs, rubs, or gallops  ABDOMEN: Soft, Nontender, Nondistended; Bowel sounds present  EXTREMITIES:  2+ Peripheral Pulses, No clubbing or cyanosis  LYMPH: No lymphadenopathy noted  SKIN: No rashes or lesions    LABS:                        10.8   11.49 )-----------( 287      ( 20 Jul 2022 07:08 )             34.4     20 Jul 2022 07:07    140    |  98     |  58     ----------------------------<  93     3.3     |  26     |  2.18     Ca    8.6        20 Jul 2022 07:07  Phos  6.5       20 Jul 2022 07:07  Mg     2.8       20 Jul 2022 07:07          CAPILLARY BLOOD GLUCOSE            Cultures      RADIOLOGY & ADDITIONAL TESTS:    Imaging Personally Reviewed:  [ X ] YES  [ ] NO    Consultant(s) Notes Reviewed:  [X ] YES  [ ] NO    Care Discussed with Consultants/Other Providers [X ] YES  [ ] NO

## 2022-07-20 NOTE — CONSULT NOTE ADULT - SUBJECTIVE AND OBJECTIVE BOX
Patient is a 89y old  Female who presents with a chief complaint of Shortness of breath (19 Jul 2022 16:14)      HPI:  89 year old female with PMH of Parkinson's disease, HTN, chronic DVT on Eliquis, and hypothyroidism who was brought in by EMS for shortness of breath. Limited history, obtained through paperwork from nursing home. Patient was suspected to have bilateral pneumonia based on chest X-ray at nursing home yesterday 7/8. On initial presentation patient was afebrile, had non labored breathing on 6L nasal canula, and was AOx1. In the ED, patient was found to be hypercapnic with a PCO2 of 104 and pH of 7.19 off a VBG. Patient was placed on avaps; however she continued to be hypercapnic and acidotic with repeat VBG showing PCO2 of 117 and pH of 7.09. At this time, patient was intubated.  (09 Jul 2022 05:11)    Podiatry consulted for bilat foot DTIs.    PAST MEDICAL & SURGICAL HISTORY:  HTN (hypertension)      Parkinsons      HLD (hyperlipidemia)      DVT, lower extremity      Hypothyroid      No significant past surgical history          MEDICATIONS  (STANDING):  ALBUTerol    90 MICROgram(s) HFA Inhaler      albuterol/ipratropium for Nebulization 3 milliLiter(s) Nebulizer every 6 hours  apixaban 5 milliGRAM(s) Oral every 12 hours  ascorbic acid 500 milliGRAM(s) Oral daily  bethanechol 25 milliGRAM(s) Oral three times a day  carbidopa/levodopa  25/100 1 Tablet(s) Oral three times a day  chlorhexidine 4% Liquid 1 Application(s) Topical <User Schedule>  levothyroxine 100 MICROGram(s) Oral daily  metolazone 5 milliGRAM(s) Oral daily  multivitamin 1 Tablet(s) Oral daily    MEDICATIONS  (PRN):  acetaminophen     Tablet .. 650 milliGRAM(s) Oral every 6 hours PRN Temp greater or equal to 38C (100.4F), Mild Pain (1 - 3)  melatonin 3 milliGRAM(s) Oral at bedtime PRN Insomnia      Allergies    No Known Allergies    Intolerances        VITALS:    Vital Signs Last 24 Hrs  T(C): 37.6 (20 Jul 2022 13:26), Max: 37.6 (20 Jul 2022 13:26)  T(F): 99.6 (20 Jul 2022 13:26), Max: 99.6 (20 Jul 2022 13:26)  HR: 99 (20 Jul 2022 13:26) (77 - 99)  BP: 123/88 (20 Jul 2022 13:26) (104/56 - 123/88)  BP(mean): --  RR: 18 (20 Jul 2022 13:26) (18 - 18)  SpO2: 95% (20 Jul 2022 13:26) (92% - 95%)    Parameters below as of 20 Jul 2022 13:26  Patient On (Oxygen Delivery Method): room air        LABS:                          10.8   11.49 )-----------( 287      ( 20 Jul 2022 07:08 )             34.4       07-20    140  |  98  |  58<H>  ----------------------------<  93  3.3<L>   |  26  |  2.18<H>    Ca    8.6      20 Jul 2022 07:07  Phos  6.5     07-20  Mg     2.8     07-20        CAPILLARY BLOOD GLUCOSE              LOWER EXTREMITY PHYSICAL EXAM:    Vasular: DP/PT 2/4, B/L, CFT <3 seconds B/L, Temperature gradient WNL, B/L.   Neuro: unable to assess.  Musculoskeletal/Ortho: severely contracted bilat lower extremities   Skin: left forefoot/midfoot plantar blister with deep tissue injury secondary to pressure due to contracted position, right posterior heel DTI secondary to pressure due to contracted position both with no signs of infection

## 2022-07-21 LAB
ANION GAP SERPL CALC-SCNC: 21 MMOL/L — HIGH (ref 5–17)
BUN SERPL-MCNC: 72 MG/DL — HIGH (ref 7–23)
CALCIUM SERPL-MCNC: 9.1 MG/DL — SIGNIFICANT CHANGE UP (ref 8.4–10.5)
CHLORIDE SERPL-SCNC: 98 MMOL/L — SIGNIFICANT CHANGE UP (ref 96–108)
CO2 SERPL-SCNC: 26 MMOL/L — SIGNIFICANT CHANGE UP (ref 22–31)
CREAT SERPL-MCNC: 2.62 MG/DL — HIGH (ref 0.5–1.3)
EGFR: 17 ML/MIN/1.73M2 — LOW
GLUCOSE BLDC GLUCOMTR-MCNC: 149 MG/DL — HIGH (ref 70–99)
GLUCOSE SERPL-MCNC: 174 MG/DL — HIGH (ref 70–99)
HCT VFR BLD CALC: 35.2 % — SIGNIFICANT CHANGE UP (ref 34.5–45)
HGB BLD-MCNC: 11.1 G/DL — LOW (ref 11.5–15.5)
MAGNESIUM SERPL-MCNC: 3 MG/DL — HIGH (ref 1.6–2.6)
MCHC RBC-ENTMCNC: 29.8 PG — SIGNIFICANT CHANGE UP (ref 27–34)
MCHC RBC-ENTMCNC: 31.5 GM/DL — LOW (ref 32–36)
MCV RBC AUTO: 94.4 FL — SIGNIFICANT CHANGE UP (ref 80–100)
NRBC # BLD: 0 /100 WBCS — SIGNIFICANT CHANGE UP (ref 0–0)
PHOSPHATE SERPL-MCNC: 7.1 MG/DL — HIGH (ref 2.5–4.5)
PLATELET # BLD AUTO: 290 K/UL — SIGNIFICANT CHANGE UP (ref 150–400)
POTASSIUM SERPL-MCNC: 3.6 MMOL/L — SIGNIFICANT CHANGE UP (ref 3.5–5.3)
POTASSIUM SERPL-SCNC: 3.6 MMOL/L — SIGNIFICANT CHANGE UP (ref 3.5–5.3)
RBC # BLD: 3.73 M/UL — LOW (ref 3.8–5.2)
RBC # FLD: 14.4 % — SIGNIFICANT CHANGE UP (ref 10.3–14.5)
SARS-COV-2 RNA SPEC QL NAA+PROBE: SIGNIFICANT CHANGE UP
SODIUM SERPL-SCNC: 145 MMOL/L — SIGNIFICANT CHANGE UP (ref 135–145)
WBC # BLD: 10.89 K/UL — HIGH (ref 3.8–10.5)
WBC # FLD AUTO: 10.89 K/UL — HIGH (ref 3.8–10.5)

## 2022-07-21 RX ORDER — SODIUM CHLORIDE 9 MG/ML
1000 INJECTION INTRAMUSCULAR; INTRAVENOUS; SUBCUTANEOUS
Refills: 0 | Status: DISCONTINUED | OUTPATIENT
Start: 2022-07-21 | End: 2022-07-22

## 2022-07-21 RX ADMIN — Medication 100 MICROGRAM(S): at 06:04

## 2022-07-21 RX ADMIN — CARBIDOPA AND LEVODOPA 1 TABLET(S): 25; 100 TABLET ORAL at 14:31

## 2022-07-21 RX ADMIN — APIXABAN 5 MILLIGRAM(S): 2.5 TABLET, FILM COATED ORAL at 17:32

## 2022-07-21 RX ADMIN — Medication 3 MILLILITER(S): at 12:10

## 2022-07-21 RX ADMIN — Medication 500 MILLIGRAM(S): at 12:10

## 2022-07-21 RX ADMIN — Medication 3 MILLILITER(S): at 17:31

## 2022-07-21 RX ADMIN — Medication 3 MILLILITER(S): at 06:05

## 2022-07-21 RX ADMIN — CARBIDOPA AND LEVODOPA 1 TABLET(S): 25; 100 TABLET ORAL at 22:37

## 2022-07-21 RX ADMIN — Medication 25 MILLIGRAM(S): at 06:00

## 2022-07-21 RX ADMIN — APIXABAN 5 MILLIGRAM(S): 2.5 TABLET, FILM COATED ORAL at 06:00

## 2022-07-21 RX ADMIN — CARBIDOPA AND LEVODOPA 1 TABLET(S): 25; 100 TABLET ORAL at 06:01

## 2022-07-21 RX ADMIN — AMLODIPINE BESYLATE 5 MILLIGRAM(S): 2.5 TABLET ORAL at 06:09

## 2022-07-21 RX ADMIN — CHLORHEXIDINE GLUCONATE 1 APPLICATION(S): 213 SOLUTION TOPICAL at 06:04

## 2022-07-21 RX ADMIN — Medication 1 TABLET(S): at 12:11

## 2022-07-21 RX ADMIN — Medication 25 MILLIGRAM(S): at 14:29

## 2022-07-21 RX ADMIN — SODIUM CHLORIDE 50 MILLILITER(S): 9 INJECTION INTRAMUSCULAR; INTRAVENOUS; SUBCUTANEOUS at 17:29

## 2022-07-21 RX ADMIN — Medication 25 MILLIGRAM(S): at 22:37

## 2022-07-21 NOTE — PROGRESS NOTE ADULT - ASSESSMENT
89-Year-old Female PMH Dementia, Parkinson's Disease, Dysphagia, DVT, Muscle Weakness, Gait Instability, Pressure Ulcer, DVT, HTN, HYPOTHYROIDISM, DEPRESSION, PARKINSONS, MOOD DISORDER, DEMENTIA, DECUBITUS ULCERS, SEPSIS, REFLUX, INSOMNIA, CONSTIPATION, HLD who was found hypoxic in SNF last night, she was treated with one dose of IM lasix and IV Zosyn, O2 initially improved but then dropped again to 80s with respiratory distress. She was transferred to [facility] for more evaluation due to acute respiratory failure. She was found with Co2 retention and acidosis. She was started on BIPAP and IV broad abx therapy and was admitted in MICU.     hypernatremia- resolved .   elevate Cre- . Off of Lasix and ACEI/ARBS. Cre trending up, started on IVF NACL 500 cc/hr x 24 hours   Acute hypercapnic / hypoxic respiratory failure - s/p intubation on AC, sedated with Fentanyl transition to Precedex  f/u with icu as primary team.  Patient extubated today 7/15/22, tolerating well on RA. Continue to monitor.   Aspiration pneumonia -resolved . Off of   antibiotics.   HF - ? with elevated proBNP, Echo with normal EF. Off of Lasix  due to  worsening Cr.   palliative - consult palliative care. Patient is still full code.   depression/anxiety- at home was on  Escitalopram 10 mg. F/u psych   weight loss- improved on puree diet with supplementary diet   sacral wound stage 4- Continue Collagen topical powder and calcium alginate with zinc oxide to periwound. F/u wound care team. Offload pressure.  HTN- home med Amlodipine 10 mg . D/ C Losartan, due to worsening Cr.   HLD - home meds as Simvastatin 20 mg on hold   urinary retention - home meds as  urecholine TID on hold   DVT ppx- Eliquis 5 mg BID  Parkinson's - Sinemet 25/100 mg TID  Hypothyroidism- Synthroid to 100 mcg.

## 2022-07-21 NOTE — PROGRESS NOTE ADULT - SUBJECTIVE AND OBJECTIVE BOX
Patient is a 89y Female whom presented to the hospital with hypernatremia      PAST MEDICAL & SURGICAL HISTORY:  HTN (hypertension)      Parkinsons      HLD (hyperlipidemia)      DVT, lower extremity      Hypothyroid      No significant past surgical history          MEDICATIONS  (STANDING):  ALBUTerol    90 MICROgram(s) HFA Inhaler      albuterol/ipratropium for Nebulization 3 milliLiter(s) Nebulizer every 6 hours  amLODIPine   Tablet 10 milliGRAM(s) Oral daily  apixaban 5 milliGRAM(s) Oral every 12 hours  ascorbic acid 500 milliGRAM(s) Oral daily  bethanechol 25 milliGRAM(s) Oral three times a day  carbidopa/levodopa  25/100 1 Tablet(s) Oral three times a day  chlorhexidine 4% Liquid 1 Application(s) Topical <User Schedule>  dextrose 5%. 1000 milliLiter(s) (50 mL/Hr) IV Continuous <Continuous>  furosemide    Tablet 40 milliGRAM(s) Oral daily  levothyroxine 100 MICROGram(s) Oral daily  losartan 50 milliGRAM(s) Oral daily  metolazone 5 milliGRAM(s) Oral daily  multivitamin 1 Tablet(s) Oral daily      Allergies    No Known Allergies    Intolerances  [facility]      SOCIAL HISTORY:  Denies ETOh,Smoking,     FAMILY HISTORY:      REVIEW OF SYSTEMS:    CONSTITUTIONAL: No weakness, fevers or chills  RESPIRATORY: No cough, wheezing, hemoptysis; No shortness of breath  CARDIOVASCULAR: No chest pain or palpitations  GASTROINTESTINAL: No abdominal or epigastric pain. No nausea, vomiting,   GENITOURINARY: No dysuria, frequency or hematuria  SKIN: dry                                                                      11.1   10.89 )-----------( 290      ( 21 Jul 2022 15:19 )             35.2       CBC Full  -  ( 21 Jul 2022 15:19 )  WBC Count : 10.89 K/uL  RBC Count : 3.73 M/uL  Hemoglobin : 11.1 g/dL  Hematocrit : 35.2 %  Platelet Count - Automated : 290 K/uL  Mean Cell Volume : 94.4 fl  Mean Cell Hemoglobin : 29.8 pg  Mean Cell Hemoglobin Concentration : 31.5 gm/dL  Auto Neutrophil # : x  Auto Lymphocyte # : x  Auto Monocyte # : x  Auto Eosinophil # : x  Auto Basophil # : x  Auto Neutrophil % : x  Auto Lymphocyte % : x  Auto Monocyte % : x  Auto Eosinophil % : x  Auto Basophil % : x      07-21    145  |  98  |  72<H>  ----------------------------<  174<H>  3.6   |  26  |  2.62<H>    Ca    9.1      21 Jul 2022 15:19  Phos  7.1     07-21  Mg     3.0     07-21        CAPILLARY BLOOD GLUCOSE      POCT Blood Glucose.: 149 mg/dL (21 Jul 2022 08:26)      Vital Signs Last 24 Hrs  T(C): 37 (21 Jul 2022 20:41), Max: 37.3 (21 Jul 2022 05:17)  T(F): 98.6 (21 Jul 2022 20:41), Max: 99.2 (21 Jul 2022 05:17)  HR: 82 (21 Jul 2022 20:41) (77 - 91)  BP: 120/64 (21 Jul 2022 20:41) (120/64 - 130/66)  BP(mean): --  RR: 18 (21 Jul 2022 20:41) (18 - 18)  SpO2: 92% (21 Jul 2022 20:41) (92% - 95%)    Parameters below as of 21 Jul 2022 20:41  Patient On (Oxygen Delivery Method): room air                          PHYSICAL EXAM:    Constitutional: NAD  HEENT: conjunctive   clear   Neck:  No JVD  Respiratory: CTAB  Cardiovascular: S1 and S2  Gastrointestinal: BS+, soft, NT/ND  Extremities: No peripheral edema

## 2022-07-21 NOTE — CHART NOTE - NSCHARTNOTEFT_GEN_A_CORE
Covid exposure on 7/21 ( roommate COVID PCR positive )  - Will send COVID PCR stat  - Guardian- Evaristo Reddy (cell- [phone]) was called, and  aware of covid exposure     Charlotte Carlson NP-C  #[id number]

## 2022-07-21 NOTE — PROGRESS NOTE ADULT - ASSESSMENT
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 Limited history, obtained through paperwork from nursing home. Patient was suspected to have bilateral pneumonia based on chest X-ray at nursing home yesterday 7/8. On initial presentation patient was afebrile, had non labored breathing on 6L nasal canula, and was AOx1. In the ED, patient was found to be hypercapnic with a PCO2 of 104 and pH of 7.19 off a VBG. Patient was placed on avaps; however she continued to be hypercapnic and acidotic with repeat VBG showing PCO2 of 117 and pH of 7.09. At this time, patient was intubated.      hypernatremia  improved   will check ua , urine osmolality , urine sodium , urine uric acid , serum sodium , serum osmolality , serum uric acid , f/u with hyponatremia work up , f/u with bmp , monitor i and o      ACUTE RENAL FAILURE: due to diuresis and hemodynamic arb , decrease po intake , hypotension hypoperfusion non oliguric atn , start iv fluid   Serum creatinine is increased    There is  progression . No uremic symptoms  No evidence of anemia .  Fluid status stable.  Will continue to avoid nephrotoxic drugs.  Patient remains asymptomatic.   Continue current therapy.  hold  diuretic.  hold   ACE inhibitor.  hold   ARB.  Additional evaluation:   ECG,    echocardiogram,     CXR,  will obtained recent   renal ultrasound to evalaute kidney size and possible stones , if cr is not improving             BP monitoring  decrease norvasc 5 mg po qd

## 2022-07-21 NOTE — PROGRESS NOTE ADULT - SUBJECTIVE AND OBJECTIVE BOX
Patient is a 89y old  Female who presents with a chief complaint of Shortness of breath (21 Jul 2022 18:50)      INTERVAL HPI/OVERNIGHT EVENTS: No event overnight. She was off of IV fluid and Lasix. Her NA is still stable. Cre is trending up. Started on IVF NACL 50 cc/hr x 24 hours. Renal ultrasound did not show any hydronephrosis. Her roommate is COVID positive. Will check her COVID PCR as well.     Pain Location & Control: OK    MEDICATIONS  (STANDING):  ALBUTerol    90 MICROgram(s) HFA Inhaler      albuterol/ipratropium for Nebulization 3 milliLiter(s) Nebulizer every 6 hours  amLODIPine   Tablet 5 milliGRAM(s) Oral daily  apixaban 5 milliGRAM(s) Oral every 12 hours  ascorbic acid 500 milliGRAM(s) Oral daily  bethanechol 25 milliGRAM(s) Oral three times a day  carbidopa/levodopa  25/100 1 Tablet(s) Oral three times a day  chlorhexidine 4% Liquid 1 Application(s) Topical <User Schedule>  levothyroxine 100 MICROGram(s) Oral daily  metolazone 5 milliGRAM(s) Oral daily  multivitamin 1 Tablet(s) Oral daily  sodium chloride 0.9%. 1000 milliLiter(s) (50 mL/Hr) IV Continuous <Continuous>    MEDICATIONS  (PRN):  acetaminophen     Tablet .. 650 milliGRAM(s) Oral every 6 hours PRN Temp greater or equal to 38C (100.4F), Mild Pain (1 - 3)  melatonin 3 milliGRAM(s) Oral at bedtime PRN Insomnia      Allergies    No Known Allergies    Intolerances        REVIEW OF SYSTEMS:  CONSTITUTIONAL: No fever, weight loss, or fatigue  EYES: No eye pain, visual disturbances, or discharge  ENMT:  No difficulty hearing, tinnitus, vertigo; No sinus or throat pain  NECK: No pain or stiffness  BREASTS: No pain, masses, or nipple discharge  RESPIRATORY: No cough, wheezing, chills or hemoptysis; No shortness of breath  CARDIOVASCULAR: No chest pain, palpitations, dizziness, or leg swelling  GASTROINTESTINAL: No abdominal or epigastric pain. No nausea, vomiting, or hematemesis; No diarrhea or constipation. No melena or hematochezia.  GENITOURINARY: No dysuria, frequency, hematuria, or incontinence  NEUROLOGICAL: No headaches, memory loss, loss of strength, numbness, or tremors  SKIN: No itching, burning, rashes, or lesions   LYMPH NODES: No enlarged glands  ENDOCRINE: No heat or cold intolerance; No hair loss; No polydipsia or polyuria  MUSCULOSKELETAL: No back pain  PSYCHIATRIC: No depression, anxiety, mood swings, or difficulty sleeping  HEME/LYMPH: No easy bruising, or bleeding gums  ALLERGY AND IMMUNOLOGIC: No hives or eczema    Vital Signs Last 24 Hrs  T(C): 37 (21 Jul 2022 20:41), Max: 37.3 (21 Jul 2022 05:17)  T(F): 98.6 (21 Jul 2022 20:41), Max: 99.2 (21 Jul 2022 05:17)  HR: 82 (21 Jul 2022 20:41) (77 - 91)  BP: 120/64 (21 Jul 2022 20:41) (120/64 - 130/66)  BP(mean): --  RR: 18 (21 Jul 2022 20:41) (18 - 18)  SpO2: 92% (21 Jul 2022 20:41) (92% - 95%)    Parameters below as of 21 Jul 2022 20:41  Patient On (Oxygen Delivery Method): room air        PHYSICAL EXAM:  GENERAL: NAD, well-groomed, well-developed  HEAD:  Atraumatic, Normocephalic  EYES: EOMI, PERRLA, conjunctiva and sclera clear  ENMT: No tonsillar erythema, exudates, or enlargement; Moist mucous membranes, Good dentition, No lesions  NECK: Supple, No JVD, Normal thyroid  NERVOUS SYSTEM:  Alert & Oriented X3, Good concentration; Motor Strength 5/5 B/L upper and lower extremities; DTRs 2+ intact and symmetric  CHEST/LUNG: Clear to auscultation bilaterally; No rales, rhonchi, wheezing, or rubs  HEART: Regular rate and rhythm; No murmurs, rubs, or gallops  ABDOMEN: Soft, Nontender, Nondistended; Bowel sounds present  EXTREMITIES:  2+ Peripheral Pulses, No clubbing or cyanosis  LYMPH: No lymphadenopathy noted  SKIN: No rashes or lesions    LABS:                        11.1   10.89 )-----------( 290      ( 21 Jul 2022 15:19 )             35.2     21 Jul 2022 15:19    145    |  98     |  72     ----------------------------<  174    3.6     |  26     |  2.62     Ca    9.1        21 Jul 2022 15:19  Phos  7.1       21 Jul 2022 15:19  Mg     3.0       21 Jul 2022 15:19          CAPILLARY BLOOD GLUCOSE      POCT Blood Glucose.: 149 mg/dL (21 Jul 2022 08:26)        Cultures      RADIOLOGY & ADDITIONAL TESTS:    Imaging Personally Reviewed:  [ X] YES  [ ] NO    Consultant(s) Notes Reviewed:  [X ] YES  [ ] NO    Care Discussed with Consultants/Other Providers [X ] YES  [ ] NO

## 2022-07-22 LAB
ANION GAP SERPL CALC-SCNC: 18 MMOL/L — HIGH (ref 5–17)
APPEARANCE UR: ABNORMAL
BACTERIA # UR AUTO: NEGATIVE — SIGNIFICANT CHANGE UP
BILIRUB UR-MCNC: NEGATIVE — SIGNIFICANT CHANGE UP
BUN SERPL-MCNC: 79 MG/DL — HIGH (ref 7–23)
CALCIUM SERPL-MCNC: 9 MG/DL — SIGNIFICANT CHANGE UP (ref 8.4–10.5)
CHLORIDE SERPL-SCNC: 100 MMOL/L — SIGNIFICANT CHANGE UP (ref 96–108)
CO2 SERPL-SCNC: 26 MMOL/L — SIGNIFICANT CHANGE UP (ref 22–31)
COLOR SPEC: YELLOW — SIGNIFICANT CHANGE UP
COMMENT - URINE: SIGNIFICANT CHANGE UP
CREAT SERPL-MCNC: 2.78 MG/DL — HIGH (ref 0.5–1.3)
DIFF PNL FLD: NEGATIVE — SIGNIFICANT CHANGE UP
EGFR: 16 ML/MIN/1.73M2 — LOW
EPI CELLS # UR: 7 — SIGNIFICANT CHANGE UP
GLUCOSE SERPL-MCNC: 148 MG/DL — HIGH (ref 70–99)
GLUCOSE UR QL: NEGATIVE — SIGNIFICANT CHANGE UP
HCT VFR BLD CALC: 34.8 % — SIGNIFICANT CHANGE UP (ref 34.5–45)
HGB BLD-MCNC: 10.9 G/DL — LOW (ref 11.5–15.5)
HYALINE CASTS # UR AUTO: 0 /LPF — SIGNIFICANT CHANGE UP (ref 0–7)
KETONES UR-MCNC: SIGNIFICANT CHANGE UP
LEUKOCYTE ESTERASE UR-ACNC: NEGATIVE — SIGNIFICANT CHANGE UP
MAGNESIUM SERPL-MCNC: 3 MG/DL — HIGH (ref 1.6–2.6)
MCHC RBC-ENTMCNC: 29.6 PG — SIGNIFICANT CHANGE UP (ref 27–34)
MCHC RBC-ENTMCNC: 31.3 GM/DL — LOW (ref 32–36)
MCV RBC AUTO: 94.6 FL — SIGNIFICANT CHANGE UP (ref 80–100)
NITRITE UR-MCNC: NEGATIVE — SIGNIFICANT CHANGE UP
NRBC # BLD: 0 /100 WBCS — SIGNIFICANT CHANGE UP (ref 0–0)
PH UR: 5.5 — SIGNIFICANT CHANGE UP (ref 5–8)
PHOSPHATE SERPL-MCNC: 7.1 MG/DL — HIGH (ref 2.5–4.5)
PLATELET # BLD AUTO: 339 K/UL — SIGNIFICANT CHANGE UP (ref 150–400)
POTASSIUM SERPL-MCNC: 3.9 MMOL/L — SIGNIFICANT CHANGE UP (ref 3.5–5.3)
POTASSIUM SERPL-SCNC: 3.9 MMOL/L — SIGNIFICANT CHANGE UP (ref 3.5–5.3)
PROT UR-MCNC: ABNORMAL
RAPID RVP RESULT: SIGNIFICANT CHANGE UP
RAPID RVP RESULT: SIGNIFICANT CHANGE UP
RBC # BLD: 3.68 M/UL — LOW (ref 3.8–5.2)
RBC # FLD: 14.6 % — HIGH (ref 10.3–14.5)
RBC CASTS # UR COMP ASSIST: 1 /HPF — SIGNIFICANT CHANGE UP (ref 0–4)
SARS-COV-2 RNA SPEC QL NAA+PROBE: SIGNIFICANT CHANGE UP
SODIUM SERPL-SCNC: 144 MMOL/L — SIGNIFICANT CHANGE UP (ref 135–145)
SP GR SPEC: 1.03 — HIGH (ref 1.01–1.02)
UROBILINOGEN FLD QL: NEGATIVE — SIGNIFICANT CHANGE UP
WBC # BLD: 14.48 K/UL — HIGH (ref 3.8–10.5)
WBC # FLD AUTO: 14.48 K/UL — HIGH (ref 3.8–10.5)
WBC UR QL: 2 /HPF — SIGNIFICANT CHANGE UP (ref 0–5)

## 2022-07-22 PROCEDURE — 71045 X-RAY EXAM CHEST 1 VIEW: CPT | Mod: 26

## 2022-07-22 RX ORDER — PIPERACILLIN AND TAZOBACTAM 4; .5 G/20ML; G/20ML
3.38 INJECTION, POWDER, LYOPHILIZED, FOR SOLUTION INTRAVENOUS ONCE
Refills: 0 | Status: COMPLETED | OUTPATIENT
Start: 2022-07-22 | End: 2022-07-22

## 2022-07-22 RX ORDER — PIPERACILLIN AND TAZOBACTAM 4; .5 G/20ML; G/20ML
3.38 INJECTION, POWDER, LYOPHILIZED, FOR SOLUTION INTRAVENOUS EVERY 12 HOURS
Refills: 0 | Status: DISCONTINUED | OUTPATIENT
Start: 2022-07-22 | End: 2022-07-26

## 2022-07-22 RX ADMIN — AMLODIPINE BESYLATE 5 MILLIGRAM(S): 2.5 TABLET ORAL at 05:37

## 2022-07-22 RX ADMIN — Medication 25 MILLIGRAM(S): at 13:21

## 2022-07-22 RX ADMIN — CARBIDOPA AND LEVODOPA 1 TABLET(S): 25; 100 TABLET ORAL at 13:21

## 2022-07-22 RX ADMIN — CHLORHEXIDINE GLUCONATE 1 APPLICATION(S): 213 SOLUTION TOPICAL at 05:38

## 2022-07-22 RX ADMIN — Medication 25 MILLIGRAM(S): at 05:37

## 2022-07-22 RX ADMIN — Medication 3 MILLILITER(S): at 23:20

## 2022-07-22 RX ADMIN — Medication 3 MILLILITER(S): at 18:50

## 2022-07-22 RX ADMIN — Medication 3 MILLILITER(S): at 00:27

## 2022-07-22 RX ADMIN — Medication 3 MILLILITER(S): at 13:21

## 2022-07-22 RX ADMIN — APIXABAN 5 MILLIGRAM(S): 2.5 TABLET, FILM COATED ORAL at 18:50

## 2022-07-22 RX ADMIN — Medication 100 MICROGRAM(S): at 05:37

## 2022-07-22 RX ADMIN — Medication 25 MILLIGRAM(S): at 21:19

## 2022-07-22 RX ADMIN — CARBIDOPA AND LEVODOPA 1 TABLET(S): 25; 100 TABLET ORAL at 21:19

## 2022-07-22 RX ADMIN — APIXABAN 5 MILLIGRAM(S): 2.5 TABLET, FILM COATED ORAL at 05:36

## 2022-07-22 RX ADMIN — CARBIDOPA AND LEVODOPA 1 TABLET(S): 25; 100 TABLET ORAL at 05:37

## 2022-07-22 RX ADMIN — Medication 500 MILLIGRAM(S): at 13:22

## 2022-07-22 RX ADMIN — Medication 3 MILLILITER(S): at 05:37

## 2022-07-22 RX ADMIN — PIPERACILLIN AND TAZOBACTAM 200 GRAM(S): 4; .5 INJECTION, POWDER, LYOPHILIZED, FOR SOLUTION INTRAVENOUS at 13:38

## 2022-07-22 RX ADMIN — Medication 1 TABLET(S): at 13:22

## 2022-07-22 NOTE — PROGRESS NOTE ADULT - SUBJECTIVE AND OBJECTIVE BOX
Patient is a 89y old  Female who presents with a chief complaint of Shortness of breath (2022 18:50)      INTERVAL HPI/OVERNIGHT EVENTS: Patient was febrile last night 100.3 then 100.6 this morning. Repeat CXR, blood cx, UA and U C&S. Started on IV Zosyn  renal dose. COVID swab was negative.  Patient exposed to roommate who was positive with COVID. So far COVID is negative. RVP panel is pending. Renal function is getting worse even after some IVF fluid . Continue monitoring renal function. May consider to put foster catheter. Off of Lasix.     Pain Location & Control: OK    MEDICATIONS  (STANDING):  ALBUTerol    90 MICROgram(s) HFA Inhaler      albuterol/ipratropium for Nebulization 3 milliLiter(s) Nebulizer every 6 hours  amLODIPine   Tablet 5 milliGRAM(s) Oral daily  apixaban 5 milliGRAM(s) Oral every 12 hours  ascorbic acid 500 milliGRAM(s) Oral daily  bethanechol 25 milliGRAM(s) Oral three times a day  carbidopa/levodopa  25/100 1 Tablet(s) Oral three times a day  chlorhexidine 4% Liquid 1 Application(s) Topical <User Schedule>  levothyroxine 100 MICROGram(s) Oral daily  metolazone 5 milliGRAM(s) Oral daily  multivitamin 1 Tablet(s) Oral daily  piperacillin/tazobactam IVPB.. 3.375 Gram(s) IV Intermittent every 12 hours    MEDICATIONS  (PRN):  acetaminophen     Tablet .. 650 milliGRAM(s) Oral every 6 hours PRN Temp greater or equal to 38C (100.4F), Mild Pain (1 - 3)  melatonin 3 milliGRAM(s) Oral at bedtime PRN Insomnia      Allergies    No Known Allergies    Intolerances        REVIEW OF SYSTEMS:  CONSTITUTIONAL: No fever, weight loss, or fatigue  EYES: No eye pain, visual disturbances, or discharge  ENMT:  No difficulty hearing, tinnitus, vertigo; No sinus or throat pain  NECK: No pain or stiffness  BREASTS: No pain, masses, or nipple discharge  RESPIRATORY: No cough, wheezing, chills or hemoptysis; No shortness of breath  CARDIOVASCULAR: No chest pain, palpitations, dizziness, or leg swelling  GASTROINTESTINAL: No abdominal or epigastric pain. No nausea, vomiting, or hematemesis; No diarrhea or constipation. No melena or hematochezia.  GENITOURINARY: No dysuria, frequency, hematuria, or incontinence  NEUROLOGICAL: No headaches, memory loss, loss of strength, numbness, or tremors  SKIN: No itching, burning, rashes, or lesions   LYMPH NODES: No enlarged glands  ENDOCRINE: No heat or cold intolerance; No hair loss; No polydipsia or polyuria  MUSCULOSKELETAL: No back pain  PSYCHIATRIC: No depression, anxiety, mood swings, or difficulty sleeping  HEME/LYMPH: No easy bruising, or bleeding gums  ALLERGY AND IMMUNOLOGIC: No hives or eczema    Vital Signs Last 24 Hrs  T(C): 36.7 (2022 16:07), Max: 38.1 (2022 10:54)  T(F): 98 (2022 16:07), Max: 100.6 (2022 10:54)  HR: 87 (2022 16:07) (82 - 94)  BP: 108/50 (2022 16:07) (104/63 - 130/66)  BP(mean): --  RR: 20 (2022 16:07) (18 - 20)  SpO2: 98% (2022 16:07) (90% - 98%)    Parameters below as of 2022 16:07  Patient On (Oxygen Delivery Method): room air        PHYSICAL EXAM:  GENERAL: NAD, well-groomed, well-developed  HEAD:  Atraumatic, Normocephalic  EYES: EOMI, PERRLA, conjunctiva and sclera clear  ENMT: No tonsillar erythema, exudates, or enlargement; Moist mucous membranes, Good dentition, No lesions  NECK: Supple, No JVD, Normal thyroid  NERVOUS SYSTEM:  Alert & Oriented X3, Good concentration; Motor Strength 5/5 B/L upper and lower extremities; DTRs 2+ intact and symmetric  CHEST/LUNG: Clear to auscultation bilaterally; No rales, rhonchi, wheezing, or rubs  HEART: Regular rate and rhythm; No murmurs, rubs, or gallops  ABDOMEN: Soft, Nontender, Nondistended; Bowel sounds present  EXTREMITIES:  2+ Peripheral Pulses, No clubbing or cyanosis  LYMPH: No lymphadenopathy noted  SKIN: No rashes or lesions    LABS:                        10.9   14.48 )-----------( 339      ( 2022 06:52 )             34.8     2022 06:52    144    |  100    |  79     ----------------------------<  148    3.9     |  26     |  2.78     Ca    9.0        2022 06:52  Phos  7.1       2022 06:52  Mg     3.0       2022 06:52        Urinalysis Basic - ( 2022 14:15 )    Color: Yellow / Appearance: Slightly Turbid / S.030 / pH: x  Gluc: x / Ketone: Trace  / Bili: Negative / Urobili: Negative   Blood: x / Protein: 100 mg/dL / Nitrite: Negative   Leuk Esterase: Negative / RBC: x / WBC x   Sq Epi: x / Non Sq Epi: x / Bacteria: x      CAPILLARY BLOOD GLUCOSE            Cultures      RADIOLOGY & ADDITIONAL TESTS:    Imaging Personally Reviewed:  [X ] YES  [ ] NO    Consultant(s) Notes Reviewed:  [X ] YES  [ ] NO    Care Discussed with Consultants/Other Providers [ X] YES  [ ] NO

## 2022-07-22 NOTE — PROGRESS NOTE ADULT - SUBJECTIVE AND OBJECTIVE BOX
Patient is a 89y Female whom presented to the hospital with hypernatremia      PAST MEDICAL & SURGICAL HISTORY:  HTN (hypertension)      Parkinsons      HLD (hyperlipidemia)      DVT, lower extremity      Hypothyroid      No significant past surgical history          MEDICATIONS  (STANDING):  ALBUTerol    90 MICROgram(s) HFA Inhaler      albuterol/ipratropium for Nebulization 3 milliLiter(s) Nebulizer every 6 hours  amLODIPine   Tablet 10 milliGRAM(s) Oral daily  apixaban 5 milliGRAM(s) Oral every 12 hours  ascorbic acid 500 milliGRAM(s) Oral daily  bethanechol 25 milliGRAM(s) Oral three times a day  carbidopa/levodopa  25/100 1 Tablet(s) Oral three times a day  chlorhexidine 4% Liquid 1 Application(s) Topical <User Schedule>  dextrose 5%. 1000 milliLiter(s) (50 mL/Hr) IV Continuous <Continuous>  furosemide    Tablet 40 milliGRAM(s) Oral daily  levothyroxine 100 MICROGram(s) Oral daily  losartan 50 milliGRAM(s) Oral daily  metolazone 5 milliGRAM(s) Oral daily  multivitamin 1 Tablet(s) Oral daily      Allergies    No Known Allergies    Intolerances  [facility]      SOCIAL HISTORY:  Denies ETOh,Smoking,     FAMILY HISTORY:      REVIEW OF SYSTEMS:    CONSTITUTIONAL: No weakness, fevers or chills  RESPIRATORY: No cough, wheezing, hemoptysis; No shortness of breath  CARDIOVASCULAR: No chest pain or palpitations  GASTROINTESTINAL: No abdominal or epigastric pain. No nausea, vomiting,   GENITOURINARY: No dysuria, frequency or hematuria  SKIN: dry                                                                      11.1   10.89 )-----------( 290      ( 21 Jul 2022 15:19 )             35.2       CBC Full  -  ( 21 Jul 2022 15:19 )  WBC Count : 10.89 K/uL  RBC Count : 3.73 M/uL  Hemoglobin : 11.1 g/dL  Hematocrit : 35.2 %  Platelet Count - Automated : 290 K/uL  Mean Cell Volume : 94.4 fl  Mean Cell Hemoglobin : 29.8 pg  Mean Cell Hemoglobin Concentration : 31.5 gm/dL  Auto Neutrophil # : x  Auto Lymphocyte # : x  Auto Monocyte # : x  Auto Eosinophil # : x  Auto Basophil # : x  Auto Neutrophil % : x  Auto Lymphocyte % : x  Auto Monocyte % : x  Auto Eosinophil % : x  Auto Basophil % : x      07-21    145  |  98  |  72<H>  ----------------------------<  174<H>  3.6   |  26  |  2.62<H>    Ca    9.1      21 Jul 2022 15:19  Phos  7.1     07-21  Mg     3.0     07-21        CAPILLARY BLOOD GLUCOSE      POCT Blood Glucose.: 149 mg/dL (21 Jul 2022 08:26)      Vital Signs Last 24 Hrs  T(C): 37 (21 Jul 2022 20:41), Max: 37.3 (21 Jul 2022 05:17)  T(F): 98.6 (21 Jul 2022 20:41), Max: 99.2 (21 Jul 2022 05:17)  HR: 82 (21 Jul 2022 20:41) (77 - 91)  BP: 120/64 (21 Jul 2022 20:41) (120/64 - 130/66)  BP(mean): --  RR: 18 (21 Jul 2022 20:41) (18 - 18)  SpO2: 92% (21 Jul 2022 20:41) (92% - 95%)    Parameters below as of 21 Jul 2022 20:41  Patient On (Oxygen Delivery Method): room air                          PHYSICAL EXAM:    Constitutional: NAD  HEENT: conjunctive   clear   Neck:  No JVD  Respiratory: CTAB  Cardiovascular: S1 and S2  Gastrointestinal: BS+, soft, NT/ND  Extremities: No peripheral edema

## 2022-07-22 NOTE — CHART NOTE - NSCHARTNOTEFT_GEN_A_CORE
patient seen and examined at bedside for hypoxia now requiring oxygen 2 LPM and rectal temp 100.6  Observed on O2 2LPM, RR 20-22, temp 100.6 rectally /63 RR 18 O2 sat 92 % on 2 LPM  Alert and confused and following commands. Reports some difficulty breathing, denies dizziness, chest pain , abdominal pain, sore throat or cough   + B/L crackles, tachypnea RR 20, abdomen soft , nontender, no focal neurologic deficit   - Will check CXR  - Will send Blood cultureX 2 sets, UA and Urine culture and RVP   - Will start Zosyn renal dose empirically as per Dr. Huang  - Tylenol 650 mg PO X1 and cooling measures  - Will hold IVF for now   - The case discussed iwht Dr. Sal Carlson NP-C  #[id number]  -

## 2022-07-22 NOTE — PROGRESS NOTE ADULT - ASSESSMENT
89-Year-old Female PMH Dementia, Parkinson's Disease, Dysphagia, DVT, Muscle Weakness, Gait Instability, Pressure Ulcer, DVT, HTN, HYPOTHYROIDISM, DEPRESSION, PARKINSONS, MOOD DISORDER, DEMENTIA, DECUBITUS ULCERS, SEPSIS, REFLUX, INSOMNIA, CONSTIPATION, HLD who was found hypoxic in SNF last night, she was treated with one dose of IM lasix and IV Zosyn, O2 initially improved but then dropped again to 80s with respiratory distress. She was transferred to [facility] for more evaluation due to acute respiratory failure. She was found with Co2 retention and acidosis. She was started on BIPAP and IV broad abx therapy and was admitted in MICU.     fever- unknown origin. F/u CXR, blood cx, UA, U C&S, RVP panel. COVID so far negative. She was  exposed to positive roommate, on airborne isolation. Continue IV Zosyn for now.   hypernatremia- resolved .   elevate Cre- . Off of Lasix and ACEI/ARBS. Cre trending up, started on IVF NACL 500 cc/hr x 24 hours   Acute hypercapnic / hypoxic respiratory failure - s/p intubation on AC, sedated with Fentanyl transition to Precedex  f/u with icu as primary team.  Patient extubated today 7/15/22, tolerating well on RA. Continue to monitor.   Aspiration pneumonia -resolved . Off of   antibiotics.   HF - ? with elevated proBNP, Echo with normal EF. Off of Lasix  due to  worsening Cr.   palliative - consult palliative care. Patient is still full code.   depression/anxiety- at home was on  Escitalopram 10 mg. F/u psych   weight loss- improved on puree diet with supplementary diet   sacral wound stage 4- Continue Collagen topical powder and calcium alginate with zinc oxide to periwound. F/u wound care team. Offload pressure.  HTN- home med Amlodipine 10 mg . D/ C Losartan, due to worsening Cr.   HLD - home meds as Simvastatin 20 mg on hold   urinary retention - home meds as  urecholine TID on hold   DVT ppx- Eliquis 5 mg BID  Parkinson's - Sinemet 25/100 mg TID  Hypothyroidism- Synthroid to 100 mcg.

## 2022-07-23 LAB
ANION GAP SERPL CALC-SCNC: 20 MMOL/L — HIGH (ref 5–17)
BUN SERPL-MCNC: 88 MG/DL — HIGH (ref 7–23)
CALCIUM SERPL-MCNC: 9.1 MG/DL — SIGNIFICANT CHANGE UP (ref 8.4–10.5)
CHLORIDE SERPL-SCNC: 102 MMOL/L — SIGNIFICANT CHANGE UP (ref 96–108)
CO2 SERPL-SCNC: 26 MMOL/L — SIGNIFICANT CHANGE UP (ref 22–31)
CREAT SERPL-MCNC: 3.3 MG/DL — HIGH (ref 0.5–1.3)
EGFR: 13 ML/MIN/1.73M2 — LOW
GLUCOSE SERPL-MCNC: 140 MG/DL — HIGH (ref 70–99)
HCT VFR BLD CALC: 31.8 % — LOW (ref 34.5–45)
HGB BLD-MCNC: 9.8 G/DL — LOW (ref 11.5–15.5)
MCHC RBC-ENTMCNC: 29.5 PG — SIGNIFICANT CHANGE UP (ref 27–34)
MCHC RBC-ENTMCNC: 30.8 GM/DL — LOW (ref 32–36)
MCV RBC AUTO: 95.8 FL — SIGNIFICANT CHANGE UP (ref 80–100)
NRBC # BLD: 0 /100 WBCS — SIGNIFICANT CHANGE UP (ref 0–0)
PLATELET # BLD AUTO: 337 K/UL — SIGNIFICANT CHANGE UP (ref 150–400)
POTASSIUM SERPL-MCNC: 3.6 MMOL/L — SIGNIFICANT CHANGE UP (ref 3.5–5.3)
POTASSIUM SERPL-SCNC: 3.6 MMOL/L — SIGNIFICANT CHANGE UP (ref 3.5–5.3)
RBC # BLD: 3.32 M/UL — LOW (ref 3.8–5.2)
RBC # FLD: 14.7 % — HIGH (ref 10.3–14.5)
SODIUM SERPL-SCNC: 148 MMOL/L — HIGH (ref 135–145)
WBC # BLD: 12.56 K/UL — HIGH (ref 3.8–10.5)
WBC # FLD AUTO: 12.56 K/UL — HIGH (ref 3.8–10.5)

## 2022-07-23 RX ORDER — SODIUM CHLORIDE 9 MG/ML
1000 INJECTION, SOLUTION INTRAVENOUS
Refills: 0 | Status: DISCONTINUED | OUTPATIENT
Start: 2022-07-23 | End: 2022-07-23

## 2022-07-23 RX ORDER — APIXABAN 2.5 MG/1
2.5 TABLET, FILM COATED ORAL EVERY 12 HOURS
Refills: 0 | Status: DISCONTINUED | OUTPATIENT
Start: 2022-07-23 | End: 2022-07-29

## 2022-07-23 RX ORDER — SODIUM CHLORIDE 9 MG/ML
1000 INJECTION, SOLUTION INTRAVENOUS
Refills: 0 | Status: DISCONTINUED | OUTPATIENT
Start: 2022-07-23 | End: 2022-07-26

## 2022-07-23 RX ADMIN — Medication 3 MILLILITER(S): at 18:59

## 2022-07-23 RX ADMIN — Medication 25 MILLIGRAM(S): at 06:04

## 2022-07-23 RX ADMIN — CARBIDOPA AND LEVODOPA 1 TABLET(S): 25; 100 TABLET ORAL at 06:04

## 2022-07-23 RX ADMIN — CHLORHEXIDINE GLUCONATE 1 APPLICATION(S): 213 SOLUTION TOPICAL at 06:05

## 2022-07-23 RX ADMIN — Medication 3 MILLILITER(S): at 13:13

## 2022-07-23 RX ADMIN — Medication 3 MILLILITER(S): at 23:55

## 2022-07-23 RX ADMIN — AMLODIPINE BESYLATE 5 MILLIGRAM(S): 2.5 TABLET ORAL at 06:04

## 2022-07-23 RX ADMIN — Medication 100 MICROGRAM(S): at 06:05

## 2022-07-23 RX ADMIN — CARBIDOPA AND LEVODOPA 1 TABLET(S): 25; 100 TABLET ORAL at 21:05

## 2022-07-23 RX ADMIN — CARBIDOPA AND LEVODOPA 1 TABLET(S): 25; 100 TABLET ORAL at 15:13

## 2022-07-23 RX ADMIN — APIXABAN 5 MILLIGRAM(S): 2.5 TABLET, FILM COATED ORAL at 06:05

## 2022-07-23 RX ADMIN — Medication 25 MILLIGRAM(S): at 21:05

## 2022-07-23 RX ADMIN — APIXABAN 2.5 MILLIGRAM(S): 2.5 TABLET, FILM COATED ORAL at 19:04

## 2022-07-23 RX ADMIN — Medication 25 MILLIGRAM(S): at 15:14

## 2022-07-23 RX ADMIN — SODIUM CHLORIDE 50 MILLILITER(S): 9 INJECTION, SOLUTION INTRAVENOUS at 21:08

## 2022-07-23 RX ADMIN — PIPERACILLIN AND TAZOBACTAM 25 GRAM(S): 4; .5 INJECTION, POWDER, LYOPHILIZED, FOR SOLUTION INTRAVENOUS at 15:28

## 2022-07-23 RX ADMIN — Medication 3 MILLILITER(S): at 06:04

## 2022-07-23 RX ADMIN — PIPERACILLIN AND TAZOBACTAM 25 GRAM(S): 4; .5 INJECTION, POWDER, LYOPHILIZED, FOR SOLUTION INTRAVENOUS at 02:54

## 2022-07-23 NOTE — PROGRESS NOTE ADULT - ASSESSMENT
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 Limited history, obtained through paperwork from nursing home. Patient was suspected to have bilateral pneumonia based on chest X-ray at nursing home yesterday 7/8. On initial presentation patient was afebrile, had non labored breathing on 6L nasal canula, and was AOx1. In the ED, patient was found to be hypercapnic with a PCO2 of 104 and pH of 7.19 off a VBG. Patient was placed on avaps; however she continued to be hypercapnic and acidotic with repeat VBG showing PCO2 of 117 and pH of 7.09. At this time, patient was intubated.      hypernatremia  start d5w 50 cc per hr     ACUTE RENAL FAILURE: due to diuresis and hemodynamic arb , decrease po intake , hypotension hypoperfusion non oliguric atn , start iv fluid d5w 50 cc prt hr   dc diuretics   Serum creatinine is increased    There is  progression . No uremic symptoms  No evidence of anemia .  Fluid status stable.  Will continue to avoid nephrotoxic drugs.  Patient remains asymptomatic.   Continue current therapy.  hold   ACE inhibitor.  hold   ARB.  Additional evaluation:   ECG,    echocardiogram,     CXR,  will obtained recent   renal ultrasound to evalaute kidney size and possible stones , if cr is not improving             BP monitoring  decrease norvasc 5 mg po qd

## 2022-07-23 NOTE — PROGRESS NOTE ADULT - SUBJECTIVE AND OBJECTIVE BOX
Patient is a 89y old  Female who presents with a chief complaint of Shortness of breath (2022 15:16)      INTERVAL HPI/OVERNIGHT EVENTS: No fever overnight. Renal function getting worse. Cre still trending up to  above 3. Will insert foster catheter. Restart IVF NaCl rate 50 cc 1 L. NA up to 148, will continue hypotonic IVF. Patient still on isolation. COVID and RVP so far negative.  Waiting for next COVID test.     Pain Location & Control: OK     MEDICATIONS  (STANDING):  ALBUTerol    90 MICROgram(s) HFA Inhaler      albuterol/ipratropium for Nebulization 3 milliLiter(s) Nebulizer every 6 hours  apixaban 5 milliGRAM(s) Oral every 12 hours  bethanechol 25 milliGRAM(s) Oral three times a day  carbidopa/levodopa  25/100 1 Tablet(s) Oral three times a day  chlorhexidine 4% Liquid 1 Application(s) Topical <User Schedule>  dextrose 5%. 1000 milliLiter(s) (50 mL/Hr) IV Continuous <Continuous>  levothyroxine 100 MICROGram(s) Oral daily  piperacillin/tazobactam IVPB.. 3.375 Gram(s) IV Intermittent every 12 hours    MEDICATIONS  (PRN):  acetaminophen     Tablet .. 650 milliGRAM(s) Oral every 6 hours PRN Temp greater or equal to 38C (100.4F), Mild Pain (1 - 3)  melatonin 3 milliGRAM(s) Oral at bedtime PRN Insomnia      Allergies    No Known Allergies    Intolerances        REVIEW OF SYSTEMS:  CONSTITUTIONAL: No fever, weight loss, or fatigue  EYES: No eye pain, visual disturbances, or discharge  ENMT:  No difficulty hearing, tinnitus, vertigo; No sinus or throat pain  NECK: No pain or stiffness  BREASTS: No pain, masses, or nipple discharge  RESPIRATORY: No cough, wheezing, chills or hemoptysis; No shortness of breath  CARDIOVASCULAR: No chest pain, palpitations, dizziness, or leg swelling  GASTROINTESTINAL: No abdominal or epigastric pain. No nausea, vomiting, or hematemesis; No diarrhea or constipation. No melena or hematochezia.  GENITOURINARY: No dysuria, frequency, hematuria, or incontinence  NEUROLOGICAL: No headaches, memory loss, loss of strength, numbness, or tremors  SKIN: No itching, burning, rashes, or lesions   LYMPH NODES: No enlarged glands  ENDOCRINE: No heat or cold intolerance; No hair loss; No polydipsia or polyuria  MUSCULOSKELETAL: No back pain  PSYCHIATRIC: No depression, anxiety, mood swings, or difficulty sleeping  HEME/LYMPH: No easy bruising, or bleeding gums  ALLERGY AND IMMUNOLOGIC: No hives or eczema    Vital Signs Last 24 Hrs  T(C): 36.9 (2022 12:00), Max: 36.9 (2022 00:00)  T(F): 98.4 (2022 12:00), Max: 98.5 (2022 00:00)  HR: 79 (2022 12:00) (77 - 91)  BP: 100/42 (2022 12:00) (100/42 - 125/78)  BP(mean): --  RR: 18 (2022 12:00) (18 - 20)  SpO2: 95% (2022 12:00) (90% - 98%)    Parameters below as of 2022 12:00  Patient On (Oxygen Delivery Method): nasal cannula  O2 Flow (L/min): 2      PHYSICAL EXAM:  GENERAL: NAD, well-groomed, well-developed  HEAD:  Atraumatic, Normocephalic  EYES: EOMI, PERRLA, conjunctiva and sclera clear  ENMT: No tonsillar erythema, exudates, or enlargement; Moist mucous membranes, Good dentition, No lesions  NECK: Supple, No JVD, Normal thyroid  NERVOUS SYSTEM:  Alert & Oriented X3, Good concentration; Motor Strength 5/5 B/L upper and lower extremities; DTRs 2+ intact and symmetric  CHEST/LUNG: Clear to auscultation bilaterally; No rales, rhonchi, wheezing, or rubs  HEART: Regular rate and rhythm; No murmurs, rubs, or gallops  ABDOMEN: Soft, Nontender, Nondistended; Bowel sounds present  EXTREMITIES:  2+ Peripheral Pulses, No clubbing or cyanosis  LYMPH: No lymphadenopathy noted  SKIN: No rashes or lesions      LABS:                        9.8    12.56 )-----------( 337      ( 2022 07:39 )             31.8     2022 07:37    148    |  102    |  88     ----------------------------<  140    3.6     |  26     |  3.30     Ca    9.1        2022 07:37        Urinalysis Basic - ( 2022 14:15 )    Color: Yellow / Appearance: Slightly Turbid / S.030 / pH: x  Gluc: x / Ketone: Trace  / Bili: Negative / Urobili: Negative   Blood: x / Protein: 100 mg/dL / Nitrite: Negative   Leuk Esterase: Negative / RBC: 1 /hpf / WBC 2 /HPF   Sq Epi: x / Non Sq Epi: 7 / Bacteria: Negative      CAPILLARY BLOOD GLUCOSE            Cultures      RADIOLOGY & ADDITIONAL TESTS:    Imaging Personally Reviewed:  [ X] YES  [ ] NO    Consultant(s) Notes Reviewed:  [ X ] YES  [ ] NO    Care Discussed with Consultants/Other Providers [ X] YES  [ ] NO Patient is a 89y old  Female who presents with a chief complaint of Shortness of breath (2022 15:16)      INTERVAL HPI/OVERNIGHT EVENTS: No fever overnight. Renal function getting worse. Cre still trending up to  above 3. Will insert foster catheter. Restart IVF D5w rate 50 cc 1 L. NA up to 148, will continue hypotonic IVF. Patient still on isolation. COVID and RVP so far negative.  Waiting for next COVID test.     Pain Location & Control: OK     MEDICATIONS  (STANDING):  ALBUTerol    90 MICROgram(s) HFA Inhaler      albuterol/ipratropium for Nebulization 3 milliLiter(s) Nebulizer every 6 hours  apixaban 5 milliGRAM(s) Oral every 12 hours  bethanechol 25 milliGRAM(s) Oral three times a day  carbidopa/levodopa  25/100 1 Tablet(s) Oral three times a day  chlorhexidine 4% Liquid 1 Application(s) Topical <User Schedule>  dextrose 5%. 1000 milliLiter(s) (50 mL/Hr) IV Continuous <Continuous>  levothyroxine 100 MICROGram(s) Oral daily  piperacillin/tazobactam IVPB.. 3.375 Gram(s) IV Intermittent every 12 hours    MEDICATIONS  (PRN):  acetaminophen     Tablet .. 650 milliGRAM(s) Oral every 6 hours PRN Temp greater or equal to 38C (100.4F), Mild Pain (1 - 3)  melatonin 3 milliGRAM(s) Oral at bedtime PRN Insomnia      Allergies    No Known Allergies    Intolerances        REVIEW OF SYSTEMS:  CONSTITUTIONAL: No fever, weight loss, or fatigue  EYES: No eye pain, visual disturbances, or discharge  ENMT:  No difficulty hearing, tinnitus, vertigo; No sinus or throat pain  NECK: No pain or stiffness  BREASTS: No pain, masses, or nipple discharge  RESPIRATORY: No cough, wheezing, chills or hemoptysis; No shortness of breath  CARDIOVASCULAR: No chest pain, palpitations, dizziness, or leg swelling  GASTROINTESTINAL: No abdominal or epigastric pain. No nausea, vomiting, or hematemesis; No diarrhea or constipation. No melena or hematochezia.  GENITOURINARY: No dysuria, frequency, hematuria, or incontinence  NEUROLOGICAL: No headaches, memory loss, loss of strength, numbness, or tremors  SKIN: No itching, burning, rashes, or lesions   LYMPH NODES: No enlarged glands  ENDOCRINE: No heat or cold intolerance; No hair loss; No polydipsia or polyuria  MUSCULOSKELETAL: No back pain  PSYCHIATRIC: No depression, anxiety, mood swings, or difficulty sleeping  HEME/LYMPH: No easy bruising, or bleeding gums  ALLERGY AND IMMUNOLOGIC: No hives or eczema    Vital Signs Last 24 Hrs  T(C): 36.9 (2022 12:00), Max: 36.9 (2022 00:00)  T(F): 98.4 (2022 12:00), Max: 98.5 (2022 00:00)  HR: 79 (2022 12:00) (77 - 91)  BP: 100/42 (2022 12:00) (100/42 - 125/78)  BP(mean): --  RR: 18 (2022 12:00) (18 - 20)  SpO2: 95% (2022 12:00) (90% - 98%)    Parameters below as of 2022 12:00  Patient On (Oxygen Delivery Method): nasal cannula  O2 Flow (L/min): 2      PHYSICAL EXAM:  GENERAL: NAD, well-groomed, well-developed  HEAD:  Atraumatic, Normocephalic  EYES: EOMI, PERRLA, conjunctiva and sclera clear  ENMT: No tonsillar erythema, exudates, or enlargement; Moist mucous membranes, Good dentition, No lesions  NECK: Supple, No JVD, Normal thyroid  NERVOUS SYSTEM:  Alert & Oriented X2, Good concentration; weakness   CHEST/LUNG: Clear to auscultation bilaterally; No rales, rhonchi, wheezing, or rubs  HEART: Regular rate and rhythm; No murmurs, rubs, or gallops  ABDOMEN: Soft, Nontender, Nondistended; Bowel sounds present  EXTREMITIES:  2+ Peripheral Pulses, No clubbing or cyanosis, contracted   LYMPH: No lymphadenopathy noted  SKIN: sacral DU     LABS:                        9.8    12.56 )-----------( 337      ( 2022 07:39 )             31.8     2022 07:37    148    |  102    |  88     ----------------------------<  140    3.6     |  26     |  3.30     Ca    9.1        2022 07:37        Urinalysis Basic - ( 2022 14:15 )    Color: Yellow / Appearance: Slightly Turbid / S.030 / pH: x  Gluc: x / Ketone: Trace  / Bili: Negative / Urobili: Negative   Blood: x / Protein: 100 mg/dL / Nitrite: Negative   Leuk Esterase: Negative / RBC: 1 /hpf / WBC 2 /HPF   Sq Epi: x / Non Sq Epi: 7 / Bacteria: Negative      CAPILLARY BLOOD GLUCOSE            Cultures      RADIOLOGY & ADDITIONAL TESTS:    Imaging Personally Reviewed:  [ X] YES  [ ] NO    Consultant(s) Notes Reviewed:  [ X ] YES  [ ] NO    Care Discussed with Consultants/Other Providers [ X] YES  [ ] NO

## 2022-07-23 NOTE — PROGRESS NOTE ADULT - SUBJECTIVE AND OBJECTIVE BOX
Patient is a 89y Female whom presented to the hospital with hypernatremia      PAST MEDICAL & SURGICAL HISTORY:  HTN (hypertension)      Parkinsons      HLD (hyperlipidemia)      DVT, lower extremity      Hypothyroid      No significant past surgical history          MEDICATIONS  (STANDING):  ALBUTerol    90 MICROgram(s) HFA Inhaler      albuterol/ipratropium for Nebulization 3 milliLiter(s) Nebulizer every 6 hours  amLODIPine   Tablet 10 milliGRAM(s) Oral daily  apixaban 5 milliGRAM(s) Oral every 12 hours  ascorbic acid 500 milliGRAM(s) Oral daily  bethanechol 25 milliGRAM(s) Oral three times a day  carbidopa/levodopa  25/100 1 Tablet(s) Oral three times a day  chlorhexidine 4% Liquid 1 Application(s) Topical <User Schedule>  dextrose 5%. 1000 milliLiter(s) (50 mL/Hr) IV Continuous <Continuous>  furosemide    Tablet 40 milliGRAM(s) Oral daily  levothyroxine 100 MICROGram(s) Oral daily  losartan 50 milliGRAM(s) Oral daily  metolazone 5 milliGRAM(s) Oral daily  multivitamin 1 Tablet(s) Oral daily      Allergies    No Known Allergies    Intolerances  [facility]      SOCIAL HISTORY:  Denies ETOh,Smoking,     FAMILY HISTORY:      REVIEW OF SYSTEMS:    CONSTITUTIONAL: No weakness, fevers or chills  RESPIRATORY: No cough, wheezing, hemoptysis; No shortness of breath  CARDIOVASCULAR: No chest pain or palpitations  GASTROINTESTINAL: No abdominal or epigastric pain. No nausea, vomiting,   GENITOURINARY: No dysuria, frequency or hematuria  SKIN: dry                                                                              9.8    12.56 )-----------( 337      ( 2022 07:39 )             31.8       CBC Full  -  ( 2022 07:39 )  WBC Count : 12.56 K/uL  RBC Count : 3.32 M/uL  Hemoglobin : 9.8 g/dL  Hematocrit : 31.8 %  Platelet Count - Automated : 337 K/uL  Mean Cell Volume : 95.8 fl  Mean Cell Hemoglobin : 29.5 pg  Mean Cell Hemoglobin Concentration : 30.8 gm/dL  Auto Neutrophil # : x  Auto Lymphocyte # : x  Auto Monocyte # : x  Auto Eosinophil # : x  Auto Basophil # : x  Auto Neutrophil % : x  Auto Lymphocyte % : x  Auto Monocyte % : x  Auto Eosinophil % : x  Auto Basophil % : x      -    148<H>  |  102  |  88<H>  ----------------------------<  140<H>  3.6   |  26  |  3.30<H>    Ca    9.1      2022 07:37  Phos  7.1     07-22  Mg     3.0     07-22        CAPILLARY BLOOD GLUCOSE          Vital Signs Last 24 Hrs  T(C): 36.8 (2022 17:47), Max: 36.9 (2022 00:00)  T(F): 98.3 (2022 17:47), Max: 98.5 (2022 00:00)  HR: 79 (2022 17:47) (77 - 85)  BP: 117/60 (2022 17:47) (100/42 - 125/78)  BP(mean): --  RR: 18 (2022 17:47) (18 - 18)  SpO2: 91% (2022 17:47) (91% - 95%)    Parameters below as of 2022 17:47  Patient On (Oxygen Delivery Method): room air        Urinalysis Basic - ( 2022 14:15 )    Color: Yellow / Appearance: Slightly Turbid / S.030 / pH: x  Gluc: x / Ketone: Trace  / Bili: Negative / Urobili: Negative   Blood: x / Protein: 100 mg/dL / Nitrite: Negative   Leuk Esterase: Negative / RBC: 1 /hpf / WBC 2 /HPF   Sq Epi: x / Non Sq Epi: 7 / Bacteria: Negative                    PHYSICAL EXAM:    Constitutional: NAD  HEENT: conjunctive   clear   Neck:  No JVD  Respiratory: CTAB  Cardiovascular: S1 and S2  Gastrointestinal: BS+, soft, NT/ND  Extremities: No peripheral edema

## 2022-07-23 NOTE — PROGRESS NOTE ADULT - ASSESSMENT
89-Year-old Female PMH Dementia, Parkinson's Disease, Dysphagia, DVT, Muscle Weakness, Gait Instability, Pressure Ulcer, DVT, HTN, HYPOTHYROIDISM, DEPRESSION, PARKINSONS, MOOD DISORDER, DEMENTIA, DECUBITUS ULCERS, SEPSIS, REFLUX, INSOMNIA, CONSTIPATION, HLD who was found hypoxic in SNF last night, she was treated with one dose of IM lasix and IV Zosyn, O2 initially improved but then dropped again to 80s with respiratory distress. She was transferred to [facility] for more evaluation due to acute respiratory failure. She was found with Co2 retention and acidosis. She was started on BIPAP and IV broad abx therapy and was admitted in MICU.     fever- unknown origin. F/u CXR, blood cx, UA, U C&S, RVP panel. COVID so far negative. She was  exposed to positive roommate, on airborne isolation. Continue IV Zosyn for now.   hypernatremia- resolved .   elevate Cre- . Off of Lasix and ACEI/ARBS. Cre trending up. Insert foster catheter. Restart hypotonic IVF 1/2 Nacl 50 cc. F/u nephrologist.   Acute hypercapnic / hypoxic respiratory failure - s/p intubation on AC, sedated with Fentanyl transition to Precedex  f/u with icu as primary team.  Patient extubated today 7/15/22, tolerating well on RA. Continue to monitor.   Aspiration pneumonia -resolved . WBC trending down. Continue IV Zosyn.  Oxygenation stable.   HF - ? with elevated proBNP, Echo with normal EF. Off of Lasix  due to  worsening Cr.   palliative - consult palliative care. Patient is still full code.   depression/anxiety- at home was on  Escitalopram 10 mg. F/u psych   weight loss- improved on puree diet with supplementary diet   sacral wound stage 4- Continue Collagen topical powder and calcium alginate with zinc oxide to periwound. F/u wound care team. Offload pressure.  HTN- home med Amlodipine 10 mg . D/ C Losartan, due to worsening Cr.   HLD - home meds as Simvastatin 20 mg on hold   urinary retention - home meds as  urecholine TID on hold   DVT ppx- Eliquis 5 mg BID  Parkinson's - Sinemet 25/100 mg TID  Hypothyroidism- Synthroid to 100 mcg.    89-Year-old Female PMH Dementia, Parkinson's Disease, Dysphagia, DVT, Muscle Weakness, Gait Instability, Pressure Ulcer, DVT, HTN, HYPOTHYROIDISM, DEPRESSION, PARKINSONS, MOOD DISORDER, DEMENTIA, DECUBITUS ULCERS, SEPSIS, REFLUX, INSOMNIA, CONSTIPATION, HLD who was found hypoxic in SNF last night, she was treated with one dose of IM lasix and IV Zosyn, O2 initially improved but then dropped again to 80s with respiratory distress. She was transferred to [facility] for more evaluation due to acute respiratory failure. She was found with Co2 retention and acidosis. She was started on BIPAP and IV broad abx therapy and was admitted in MICU.     fever- unknown origin. F/u CXR, blood cx, UA, U C&S, RVP panel. COVID so far negative. She was  exposed to positive roommate, on airborne isolation. Continue IV Zosyn for now.   hypernatremia- resolved .   elevate Cre- . Off of Lasix and ACEI/ARBS. Cre trending up. Insert foster catheter. Restart hypotonic fluid , D5w . F/u nephrologist. D/c Zaroxolyn.  Acute hypercapnic / hypoxic respiratory failure - s/p intubation on AC, sedated with Fentanyl transition to Precedex  f/u with icu as primary team.  Patient extubated today 7/15/22, tolerating well on RA. Continue to monitor.   Aspiration pneumonia -resolved . WBC trending down. Continue IV Zosyn.  Oxygenation stable.   HF - ? with elevated proBNP, Echo with normal EF. Off of Lasix  due to  worsening Cr.   palliative - consult palliative care. Patient is still full code.   depression/anxiety- at home was on  Escitalopram 10 mg. F/u psych   weight loss- improved on puree diet with supplementary diet   sacral wound stage 4- Continue Collagen topical powder and calcium alginate with zinc oxide to periwound. F/u wound care team. Offload pressure.  HTN- home med Amlodipine 10 mg . D/ C Losartan, due to worsening Cr.   HLD - home meds as Simvastatin 20 mg on hold   urinary retention - home meds as  urecholine TID on hold   DVT ppx- Eliquis 5 mg BID  Parkinson's - Sinemet 25/100 mg TID  Hypothyroidism- Synthroid to 100 mcg.

## 2022-07-24 LAB
ALBUMIN SERPL ELPH-MCNC: 2.9 G/DL — LOW (ref 3.3–5)
ALP SERPL-CCNC: 60 U/L — SIGNIFICANT CHANGE UP (ref 40–120)
ALT FLD-CCNC: <5 U/L — LOW (ref 10–45)
ANION GAP SERPL CALC-SCNC: 18 MMOL/L — HIGH (ref 5–17)
AST SERPL-CCNC: 21 U/L — SIGNIFICANT CHANGE UP (ref 10–40)
BILIRUB SERPL-MCNC: 0.2 MG/DL — SIGNIFICANT CHANGE UP (ref 0.2–1.2)
BUN SERPL-MCNC: 85 MG/DL — HIGH (ref 7–23)
CALCIUM SERPL-MCNC: 8.5 MG/DL — SIGNIFICANT CHANGE UP (ref 8.4–10.5)
CHLORIDE SERPL-SCNC: 102 MMOL/L — SIGNIFICANT CHANGE UP (ref 96–108)
CO2 SERPL-SCNC: 24 MMOL/L — SIGNIFICANT CHANGE UP (ref 22–31)
CREAT SERPL-MCNC: 3.8 MG/DL — HIGH (ref 0.5–1.3)
EGFR: 11 ML/MIN/1.73M2 — LOW
GLUCOSE SERPL-MCNC: 135 MG/DL — HIGH (ref 70–99)
HCT VFR BLD CALC: 31.8 % — LOW (ref 34.5–45)
HGB BLD-MCNC: 10 G/DL — LOW (ref 11.5–15.5)
MAGNESIUM SERPL-MCNC: 3.2 MG/DL — HIGH (ref 1.6–2.6)
MCHC RBC-ENTMCNC: 29.8 PG — SIGNIFICANT CHANGE UP (ref 27–34)
MCHC RBC-ENTMCNC: 31.4 GM/DL — LOW (ref 32–36)
MCV RBC AUTO: 94.6 FL — SIGNIFICANT CHANGE UP (ref 80–100)
NRBC # BLD: 0 /100 WBCS — SIGNIFICANT CHANGE UP (ref 0–0)
PHOSPHATE SERPL-MCNC: 9.4 MG/DL — HIGH (ref 2.5–4.5)
PLATELET # BLD AUTO: 297 K/UL — SIGNIFICANT CHANGE UP (ref 150–400)
POTASSIUM SERPL-MCNC: 3.5 MMOL/L — SIGNIFICANT CHANGE UP (ref 3.5–5.3)
POTASSIUM SERPL-SCNC: 3.5 MMOL/L — SIGNIFICANT CHANGE UP (ref 3.5–5.3)
PROT SERPL-MCNC: 7 G/DL — SIGNIFICANT CHANGE UP (ref 6–8.3)
RBC # BLD: 3.36 M/UL — LOW (ref 3.8–5.2)
RBC # FLD: 14.5 % — SIGNIFICANT CHANGE UP (ref 10.3–14.5)
SODIUM SERPL-SCNC: 144 MMOL/L — SIGNIFICANT CHANGE UP (ref 135–145)
WBC # BLD: 8.36 K/UL — SIGNIFICANT CHANGE UP (ref 3.8–10.5)
WBC # FLD AUTO: 8.36 K/UL — SIGNIFICANT CHANGE UP (ref 3.8–10.5)

## 2022-07-24 RX ADMIN — Medication 25 MILLIGRAM(S): at 14:33

## 2022-07-24 RX ADMIN — APIXABAN 2.5 MILLIGRAM(S): 2.5 TABLET, FILM COATED ORAL at 18:48

## 2022-07-24 RX ADMIN — APIXABAN 2.5 MILLIGRAM(S): 2.5 TABLET, FILM COATED ORAL at 05:36

## 2022-07-24 RX ADMIN — Medication 25 MILLIGRAM(S): at 21:56

## 2022-07-24 RX ADMIN — PIPERACILLIN AND TAZOBACTAM 25 GRAM(S): 4; .5 INJECTION, POWDER, LYOPHILIZED, FOR SOLUTION INTRAVENOUS at 02:16

## 2022-07-24 RX ADMIN — Medication 100 MICROGRAM(S): at 05:35

## 2022-07-24 RX ADMIN — CARBIDOPA AND LEVODOPA 1 TABLET(S): 25; 100 TABLET ORAL at 05:35

## 2022-07-24 RX ADMIN — Medication 3 MILLILITER(S): at 05:25

## 2022-07-24 RX ADMIN — CHLORHEXIDINE GLUCONATE 1 APPLICATION(S): 213 SOLUTION TOPICAL at 05:36

## 2022-07-24 RX ADMIN — CARBIDOPA AND LEVODOPA 1 TABLET(S): 25; 100 TABLET ORAL at 14:33

## 2022-07-24 RX ADMIN — Medication 25 MILLIGRAM(S): at 05:36

## 2022-07-24 RX ADMIN — CARBIDOPA AND LEVODOPA 1 TABLET(S): 25; 100 TABLET ORAL at 21:56

## 2022-07-24 RX ADMIN — Medication 3 MILLILITER(S): at 18:49

## 2022-07-24 RX ADMIN — SODIUM CHLORIDE 50 MILLILITER(S): 9 INJECTION, SOLUTION INTRAVENOUS at 14:32

## 2022-07-24 RX ADMIN — Medication 3 MILLILITER(S): at 12:26

## 2022-07-24 RX ADMIN — PIPERACILLIN AND TAZOBACTAM 25 GRAM(S): 4; .5 INJECTION, POWDER, LYOPHILIZED, FOR SOLUTION INTRAVENOUS at 14:32

## 2022-07-24 NOTE — PROGRESS NOTE ADULT - ASSESSMENT
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 Limited history, obtained through paperwork from nursing home. Patient was suspected to have bilateral pneumonia based on chest X-ray at nursing home yesterday 7/8. On initial presentation patient was afebrile, had non labored breathing on 6L nasal canula, and was AOx1. In the ED, patient was found to be hypercapnic with a PCO2 of 104 and pH of 7.19 off a VBG. Patient was placed on avaps; however she continued to be hypercapnic and acidotic with repeat VBG showing PCO2 of 117 and pH of 7.09. At this time, patient was intubated.      hypernatremia  improved   will check ua , urine osmolality , urine sodium , urine uric acid , serum sodium , serum osmolality , serum uric acid , f/u with hyponatremia work up , f/u with bmp , monitor i and o            apixaban 2.5 milliGRAM(s) Oral every 12 hours  bethanechol 25 milliGRAM(s) Oral three times a day  carbidopa/levodopa  25/100 1 Tablet(s) Oral three times a day  chlorhexidine 4% Liquid 1 Application(s) Topical <User Schedule>  dextrose 5%. 1000 milliLiter(s) (50 mL/Hr) IV Continuous <Continuous>  levothyroxine 100 MICROGram(s) Oral daily  piperacillin/tazobactam IVPB.. 3.375 Gram(s) IV Intermittent every 12 hours          ACUTE RENAL FAILURE: due to diuresis and hemodynamic arb , decrease po intake , hypotension hypoperfusion non oliguric atn , start iv fluid   Serum creatinine is increased    There is  progression . No uremic symptoms  No evidence of anemia .  Fluid status stable.  Will continue to avoid nephrotoxic drugs.  Patient remains asymptomatic.   Continue current therapy.  hold  diuretic.  hold   ACE inhibitor.  hold   ARB.      BP monitoring  decrease norvasc 5 mg po qd

## 2022-07-24 NOTE — PROGRESS NOTE ADULT - SUBJECTIVE AND OBJECTIVE BOX
Patient is a 89y Female whom presented to the hospital with hypernatremia      PAST MEDICAL & SURGICAL HISTORY:  HTN (hypertension)      Parkinsons      HLD (hyperlipidemia)      DVT, lower extremity      Hypothyroid      No significant past surgical history          MEDICATIONS  (STANDING):  ALBUTerol    90 MICROgram(s) HFA Inhaler      albuterol/ipratropium for Nebulization 3 milliLiter(s) Nebulizer every 6 hours  amLODIPine   Tablet 10 milliGRAM(s) Oral daily  apixaban 5 milliGRAM(s) Oral every 12 hours  ascorbic acid 500 milliGRAM(s) Oral daily  bethanechol 25 milliGRAM(s) Oral three times a day  carbidopa/levodopa  25/100 1 Tablet(s) Oral three times a day  chlorhexidine 4% Liquid 1 Application(s) Topical <User Schedule>  dextrose 5%. 1000 milliLiter(s) (50 mL/Hr) IV Continuous <Continuous>  furosemide    Tablet 40 milliGRAM(s) Oral daily  levothyroxine 100 MICROGram(s) Oral daily  losartan 50 milliGRAM(s) Oral daily  metolazone 5 milliGRAM(s) Oral daily  multivitamin 1 Tablet(s) Oral daily      Allergies    No Known Allergies    Intolerances  [facility]      SOCIAL HISTORY:  Denies ETOh,Smoking,     FAMILY HISTORY:      REVIEW OF SYSTEMS:    CONSTITUTIONAL: No weakness, fevers or chills  RESPIRATORY: No cough, wheezing, hemoptysis; No shortness of breath  CARDIOVASCULAR: No chest pain or palpitations  GASTROINTESTINAL: No abdominal or epigastric pain. No nausea, vomiting,   GENITOURINARY: No dysuria, frequency or hematuria  SKIN: dry                                                                      11.1   10.89 )-----------( 290      ( 21 Jul 2022 15:19 )             35.2       CBC Full  -  ( 21 Jul 2022 15:19 )  WBC Count : 10.89 K/uL  RBC Count : 3.73 M/uL  Hemoglobin : 11.1 g/dL  Hematocrit : 35.2 %  Platelet Count - Automated : 290 K/uL  Mean Cell Volume : 94.4 fl  Mean Cell Hemoglobin : 29.8 pg  Mean Cell Hemoglobin Concentration : 31.5 gm/dL  Auto Neutrophil # : x  Auto Lymphocyte # : x  Auto Monocyte # : x  Auto Eosinophil # : x  Auto Basophil # : x  Auto Neutrophil % : x  Auto Lymphocyte % : x  Auto Monocyte % : x  Auto Eosinophil % : x  Auto Basophil % : x      07-21    145  |  98  |  72<H>  ----------------------------<  174<H>  3.6   |  26  |  2.62<H>    Ca    9.1      21 Jul 2022 15:19  Phos  7.1     07-21  Mg     3.0     07-21        CAPILLARY BLOOD GLUCOSE      POCT Blood Glucose.: 149 mg/dL (21 Jul 2022 08:26)      Vital Signs Last 24 Hrs  T(C): 37 (21 Jul 2022 20:41), Max: 37.3 (21 Jul 2022 05:17)  T(F): 98.6 (21 Jul 2022 20:41), Max: 99.2 (21 Jul 2022 05:17)  HR: 82 (21 Jul 2022 20:41) (77 - 91)  BP: 120/64 (21 Jul 2022 20:41) (120/64 - 130/66)  BP(mean): --  RR: 18 (21 Jul 2022 20:41) (18 - 18)  SpO2: 92% (21 Jul 2022 20:41) (92% - 95%)    Parameters below as of 21 Jul 2022 20:41  Patient On (Oxygen Delivery Method): room air                          PHYSICAL EXAM:    Constitutional: NAD  HEENT: conjunctive   clear   Neck:  No JVD  Respiratory: CTAB  Cardiovascular: S1 and S2  Gastrointestinal: BS+, soft, NT/ND  Extremities: No peripheral edema

## 2022-07-25 LAB
ANION GAP SERPL CALC-SCNC: 23 MMOL/L — HIGH (ref 5–17)
BASOPHILS # BLD AUTO: 0.04 K/UL — SIGNIFICANT CHANGE UP (ref 0–0.2)
BASOPHILS NFR BLD AUTO: 0.5 % — SIGNIFICANT CHANGE UP (ref 0–2)
BUN SERPL-MCNC: 86 MG/DL — HIGH (ref 7–23)
CALCIUM SERPL-MCNC: 8.2 MG/DL — LOW (ref 8.4–10.5)
CHLORIDE SERPL-SCNC: 95 MMOL/L — LOW (ref 96–108)
CO2 SERPL-SCNC: 19 MMOL/L — LOW (ref 22–31)
CREAT SERPL-MCNC: 3.91 MG/DL — HIGH (ref 0.5–1.3)
EGFR: 10 ML/MIN/1.73M2 — LOW
EOSINOPHIL # BLD AUTO: 0.48 K/UL — SIGNIFICANT CHANGE UP (ref 0–0.5)
EOSINOPHIL NFR BLD AUTO: 5.8 % — SIGNIFICANT CHANGE UP (ref 0–6)
GLUCOSE SERPL-MCNC: 105 MG/DL — HIGH (ref 70–99)
HCT VFR BLD CALC: 30.7 % — LOW (ref 34.5–45)
HGB BLD-MCNC: 9.6 G/DL — LOW (ref 11.5–15.5)
IMM GRANULOCYTES NFR BLD AUTO: 0.4 % — SIGNIFICANT CHANGE UP (ref 0–1.5)
LYMPHOCYTES # BLD AUTO: 0.88 K/UL — LOW (ref 1–3.3)
LYMPHOCYTES # BLD AUTO: 10.7 % — LOW (ref 13–44)
MCHC RBC-ENTMCNC: 30 PG — SIGNIFICANT CHANGE UP (ref 27–34)
MCHC RBC-ENTMCNC: 31.3 GM/DL — LOW (ref 32–36)
MCV RBC AUTO: 95.9 FL — SIGNIFICANT CHANGE UP (ref 80–100)
MONOCYTES # BLD AUTO: 0.63 K/UL — SIGNIFICANT CHANGE UP (ref 0–0.9)
MONOCYTES NFR BLD AUTO: 7.6 % — SIGNIFICANT CHANGE UP (ref 2–14)
NEUTROPHILS # BLD AUTO: 6.2 K/UL — SIGNIFICANT CHANGE UP (ref 1.8–7.4)
NEUTROPHILS NFR BLD AUTO: 75 % — SIGNIFICANT CHANGE UP (ref 43–77)
NRBC # BLD: 0 /100 WBCS — SIGNIFICANT CHANGE UP (ref 0–0)
PLATELET # BLD AUTO: 302 K/UL — SIGNIFICANT CHANGE UP (ref 150–400)
POTASSIUM SERPL-MCNC: 3.5 MMOL/L — SIGNIFICANT CHANGE UP (ref 3.5–5.3)
POTASSIUM SERPL-SCNC: 3.5 MMOL/L — SIGNIFICANT CHANGE UP (ref 3.5–5.3)
RBC # BLD: 3.2 M/UL — LOW (ref 3.8–5.2)
RBC # FLD: 14.5 % — SIGNIFICANT CHANGE UP (ref 10.3–14.5)
SARS-COV-2 RNA SPEC QL NAA+PROBE: SIGNIFICANT CHANGE UP
SODIUM SERPL-SCNC: 137 MMOL/L — SIGNIFICANT CHANGE UP (ref 135–145)
WBC # BLD: 8.26 K/UL — SIGNIFICANT CHANGE UP (ref 3.8–10.5)
WBC # FLD AUTO: 8.26 K/UL — SIGNIFICANT CHANGE UP (ref 3.8–10.5)

## 2022-07-25 RX ORDER — ERYTHROPOIETIN 10000 [IU]/ML
20000 INJECTION, SOLUTION INTRAVENOUS; SUBCUTANEOUS
Refills: 0 | Status: DISCONTINUED | OUTPATIENT
Start: 2022-07-25 | End: 2022-07-29

## 2022-07-25 RX ADMIN — SODIUM CHLORIDE 50 MILLILITER(S): 9 INJECTION, SOLUTION INTRAVENOUS at 12:28

## 2022-07-25 RX ADMIN — CARBIDOPA AND LEVODOPA 1 TABLET(S): 25; 100 TABLET ORAL at 14:17

## 2022-07-25 RX ADMIN — Medication 3 MILLILITER(S): at 00:26

## 2022-07-25 RX ADMIN — Medication 3 MILLILITER(S): at 05:30

## 2022-07-25 RX ADMIN — APIXABAN 2.5 MILLIGRAM(S): 2.5 TABLET, FILM COATED ORAL at 05:32

## 2022-07-25 RX ADMIN — Medication 3 MILLILITER(S): at 12:28

## 2022-07-25 RX ADMIN — PIPERACILLIN AND TAZOBACTAM 25 GRAM(S): 4; .5 INJECTION, POWDER, LYOPHILIZED, FOR SOLUTION INTRAVENOUS at 14:16

## 2022-07-25 RX ADMIN — ERYTHROPOIETIN 20000 UNIT(S): 10000 INJECTION, SOLUTION INTRAVENOUS; SUBCUTANEOUS at 23:57

## 2022-07-25 RX ADMIN — Medication 100 MICROGRAM(S): at 05:32

## 2022-07-25 RX ADMIN — Medication 25 MILLIGRAM(S): at 22:03

## 2022-07-25 RX ADMIN — CHLORHEXIDINE GLUCONATE 1 APPLICATION(S): 213 SOLUTION TOPICAL at 05:31

## 2022-07-25 RX ADMIN — PIPERACILLIN AND TAZOBACTAM 25 GRAM(S): 4; .5 INJECTION, POWDER, LYOPHILIZED, FOR SOLUTION INTRAVENOUS at 02:55

## 2022-07-25 RX ADMIN — Medication 25 MILLIGRAM(S): at 05:32

## 2022-07-25 RX ADMIN — Medication 3 MILLILITER(S): at 23:57

## 2022-07-25 RX ADMIN — Medication 3 MILLILITER(S): at 17:46

## 2022-07-25 RX ADMIN — CARBIDOPA AND LEVODOPA 1 TABLET(S): 25; 100 TABLET ORAL at 22:03

## 2022-07-25 RX ADMIN — CARBIDOPA AND LEVODOPA 1 TABLET(S): 25; 100 TABLET ORAL at 05:32

## 2022-07-25 RX ADMIN — APIXABAN 2.5 MILLIGRAM(S): 2.5 TABLET, FILM COATED ORAL at 17:46

## 2022-07-25 RX ADMIN — Medication 25 MILLIGRAM(S): at 14:16

## 2022-07-25 NOTE — PROGRESS NOTE ADULT - SUBJECTIVE AND OBJECTIVE BOX
[facility]-- WOUND TEAM -- FOLLOW UP NOTE  --------------------------------------------------------------------------------    24 hour events/subjective:    tolerating po w/o n/v  incontinent of urine and stool  requires assist w/ ADLs  Afebrile  no specific c/o pain, odor, excess drainage      Diet:  Diet, Pureed:   Moderately Thick Liquids (MODTHICKLIQS)  Supplement Feeding Modality:  Oral  Ensure Enlive Cans or Servings Per Day:  2       Frequency:  Daily (07-15-22 @ 13:04)      ROS: General/ SKIN/ MSK/ Neuro/ GI see HPI  all other systems negative  pt unable to offer    ALLERGIES & MEDICATIONS  --------------------------------------------------------------------------------  Allergies: No Known Allergies    STANDING INPATIENT MEDICATIONS    ALBUTerol    90 MICROgram(s) HFA Inhaler      albuterol/ipratropium for Nebulization 3 milliLiter(s) Nebulizer every 6 hours  apixaban 2.5 milliGRAM(s) Oral every 12 hours  bethanechol 25 milliGRAM(s) Oral three times a day  carbidopa/levodopa  25/100 1 Tablet(s) Oral three times a day  chlorhexidine 4% Liquid 1 Application(s) Topical <User Schedule>  dextrose 5%. 1000 milliLiter(s) IV Continuous <Continuous>  levothyroxine 100 MICROGram(s) Oral daily  piperacillin/tazobactam IVPB.. 3.375 Gram(s) IV Intermittent every 12 hours      PRN INPATIENT MEDICATION  acetaminophen     Tablet .. 650 milliGRAM(s) Oral every 6 hours PRN  melatonin 3 milliGRAM(s) Oral at bedtime PRN        VITALS/PHYSICAL EXAM  --------------------------------------------------------------------------------  T(C): 36.9 (07-25-22 @ 11:32), Max: 36.9 (07-25-22 @ 11:32)  HR: 73 (07-25-22 @ 11:32) (73 - 73)  BP: 126/65 (07-25-22 @ 11:32) (116/77 - 126/65)  RR: 18 (07-25-22 @ 11:32) (18 - 18)  SpO2: 96% (07-25-22 @ 11:32) (96% - 98%)  Wt(kg): --        07-24-22 @ 07:01  -  07-25-22 @ 07:00  --------------------------------------------------------  IN: 2940 mL / OUT: 150 mL / NET: 2790 mL    07-25-22 @ 07:01  -  07-25-22 @ 15:15  --------------------------------------------------------  IN: 440 mL / OUT: 0 mL / NET: 440 mL    General: NAD, Alert, awake to person, place   [employer] [facility] Sport bed   HEENT:  NC/AT, EOMI, sclera clear, mucosa moist, throat clear,  Neurology: mildly confused, weakened strength, sensation grossly intact  Psych: calm and appropriate   Musculoskeletal: BUE w/ limited stiff FROM, BLE w/ contractures  Vascular: BLE equally warm,  no cyanosis, clubbing, edema       (+)DP pulses        left forefoot/midfoot plantar blister with deep tissue injury secondary to pressure due to contracted position, right posterior heel DTI secondary to pressure due to contracted position both with no signs of infection  No odor, erythema, increased warmth, tenderness, induration, fluctuance, nor crepitus  Skin:  pale, frail,  ecchymosis w/o hematoma      Lt hip excoriation - denuded skin w/o drainage or blistering      Stage 4 sacral pressure ulcer  0.5cm x 1.5 x 0.5cm; No odor, erythema, increased warmth, tenderness, induration, fluctuance, nor crepitus noted         LABS/ CULTURES/ RADIOLOGY:              9.6    8.26  >-----------<  302      [07-25-22 @ 07:11]              30.7     137  |  95  |  86  ----------------------------<  105      [07-25-22 @ 07:14]  3.5   |  19  |  3.91        Ca     8.2     [07-25-22 @ 07:14]      Mg     3.2     [07-24-22 @ 11:27]      Phos  9.4     [07-24-22 @ 11:27]    TPro  7.0  /  Alb  2.9  /  TBili  0.2  /  DBili  x   /  AST  21  /  ALT  <5  /  AlkPhos  60  [07-24-22 @ 11:27]              CAPILLARY BLOOD GLUCOSE                      Culture - Blood (collected 07-22-22 @ 13:26)  Source: .Blood Blood  Preliminary Report (07-23-22 @ 20:01):    No growth to date.

## 2022-07-25 NOTE — PROGRESS NOTE ADULT - SUBJECTIVE AND OBJECTIVE BOX
Patient is a 89y Female whom presented to the hospital with hypernatremia      PAST MEDICAL & SURGICAL HISTORY:  HTN (hypertension)      Parkinsons      HLD (hyperlipidemia)      DVT, lower extremity      Hypothyroid      No significant past surgical history          MEDICATIONS  (STANDING):  ALBUTerol    90 MICROgram(s) HFA Inhaler      albuterol/ipratropium for Nebulization 3 milliLiter(s) Nebulizer every 6 hours  amLODIPine   Tablet 10 milliGRAM(s) Oral daily  apixaban 5 milliGRAM(s) Oral every 12 hours  ascorbic acid 500 milliGRAM(s) Oral daily  bethanechol 25 milliGRAM(s) Oral three times a day  carbidopa/levodopa  25/100 1 Tablet(s) Oral three times a day  chlorhexidine 4% Liquid 1 Application(s) Topical <User Schedule>  dextrose 5%. 1000 milliLiter(s) (50 mL/Hr) IV Continuous <Continuous>  furosemide    Tablet 40 milliGRAM(s) Oral daily  levothyroxine 100 MICROGram(s) Oral daily  losartan 50 milliGRAM(s) Oral daily  metolazone 5 milliGRAM(s) Oral daily  multivitamin 1 Tablet(s) Oral daily      Allergies    No Known Allergies    Intolerances  [facility]      SOCIAL HISTORY:  Denies ETOh,Smoking,     FAMILY HISTORY:      REVIEW OF SYSTEMS:    CONSTITUTIONAL: No weakness, fevers or chills  RESPIRATORY: No cough, wheezing, hemoptysis; No shortness of breath  CARDIOVASCULAR: No chest pain or palpitations  GASTROINTESTINAL: No abdominal or epigastric pain. No nausea, vomiting,   GENITOURINARY: No dysuria, frequency or hematuria  SKIN: dry                                                                              9.8    12.56 )-----------( 337      ( 2022 07:39 )             31.8       CBC Full  -  ( 2022 07:39 )  WBC Count : 12.56 K/uL  RBC Count : 3.32 M/uL  Hemoglobin : 9.8 g/dL  Hematocrit : 31.8 %  Platelet Count - Automated : 337 K/uL  Mean Cell Volume : 95.8 fl  Mean Cell Hemoglobin : 29.5 pg  Mean Cell Hemoglobin Concentration : 30.8 gm/dL  Auto Neutrophil # : x  Auto Lymphocyte # : x  Auto Monocyte # : x  Auto Eosinophil # : x  Auto Basophil # : x  Auto Neutrophil % : x  Auto Lymphocyte % : x  Auto Monocyte % : x  Auto Eosinophil % : x  Auto Basophil % : x      -    148<H>  |  102  |  88<H>  ----------------------------<  140<H>  3.6   |  26  |  3.30<H>    Ca    9.1      2022 07:37  Phos  7.1     07-22  Mg     3.0     07-22        CAPILLARY BLOOD GLUCOSE          Vital Signs Last 24 Hrs  T(C): 36.8 (2022 17:47), Max: 36.9 (2022 00:00)  T(F): 98.3 (2022 17:47), Max: 98.5 (2022 00:00)  HR: 79 (2022 17:47) (77 - 85)  BP: 117/60 (2022 17:47) (100/42 - 125/78)  BP(mean): --  RR: 18 (2022 17:47) (18 - 18)  SpO2: 91% (2022 17:47) (91% - 95%)    Parameters below as of 2022 17:47  Patient On (Oxygen Delivery Method): room air        Urinalysis Basic - ( 2022 14:15 )    Color: Yellow / Appearance: Slightly Turbid / S.030 / pH: x  Gluc: x / Ketone: Trace  / Bili: Negative / Urobili: Negative   Blood: x / Protein: 100 mg/dL / Nitrite: Negative   Leuk Esterase: Negative / RBC: 1 /hpf / WBC 2 /HPF   Sq Epi: x / Non Sq Epi: 7 / Bacteria: Negative                    PHYSICAL EXAM:    Constitutional: NAD  HEENT: conjunctive   clear   Neck:  No JVD  Respiratory: CTAB  Cardiovascular: S1 and S2  Gastrointestinal: BS+, soft, NT/ND  Extremities: No peripheral edema

## 2022-07-25 NOTE — CHART NOTE - NSCHARTNOTEFT_GEN_A_CORE
Received contact by Dr. Huang that guardian is unwilling to participate in decision making.  I have attempted outreach to Evaristo Reddy independently, no answer, voicemail left void of PHI with call back number.  I have initiated an ethics consult to assist us navigate this situation.  [phone]

## 2022-07-25 NOTE — CHART NOTE - NSCHARTNOTEFT_GEN_A_CORE
Case discussed with Dr. Huang.  He states he will reach out to guardian to see if available for GOC, as previously was out of town.  GOC and completion of MOLST would require petition to court, therefore, need guardian to be willing/able to proceed.  will follow up with Dr. Huang regarding how to assist further.  [phone]

## 2022-07-25 NOTE — PROGRESS NOTE ADULT - SUBJECTIVE AND OBJECTIVE BOX
Patient is a 89y old  Female who presents with a chief complaint of Shortness of breath (25 Jul 2022 17:26)      INTERVAL HPI/OVERNIGHT EVENTS: No event overnight. PCR  still negative. Patient is afebrile. Cre is still rising to 3.9. D/c foster catheter. Continue IVF D5 W 500 cc/day. I spoke with the guardian over the phone and she is not willing to be involved for any code changes  or palliative. She is declining and asked us to go with our ethics committee to take over of her power in the hospital by hospital committee. I spoke with palliative who recommended to consult ethics committee for further discussion with guardian  to take over code status by hospital ethics committee. Discussed with nephrologist regarding renal function which is getting worse, not recommended dialysis  yet but in a few days may need dialysis but not good candidate due to her dementia. age, and non-ambulation.     Pain Location & Control: OK    MEDICATIONS  (STANDING):  ALBUTerol    90 MICROgram(s) HFA Inhaler      albuterol/ipratropium for Nebulization 3 milliLiter(s) Nebulizer every 6 hours  apixaban 2.5 milliGRAM(s) Oral every 12 hours  bethanechol 25 milliGRAM(s) Oral three times a day  carbidopa/levodopa  25/100 1 Tablet(s) Oral three times a day  chlorhexidine 4% Liquid 1 Application(s) Topical <User Schedule>  dextrose 5%. 1000 milliLiter(s) (50 mL/Hr) IV Continuous <Continuous>  levothyroxine 100 MICROGram(s) Oral daily  piperacillin/tazobactam IVPB.. 3.375 Gram(s) IV Intermittent every 12 hours    MEDICATIONS  (PRN):  acetaminophen     Tablet .. 650 milliGRAM(s) Oral every 6 hours PRN Temp greater or equal to 38C (100.4F), Mild Pain (1 - 3)  melatonin 3 milliGRAM(s) Oral at bedtime PRN Insomnia      Allergies    No Known Allergies    Intolerances        REVIEW OF SYSTEMS:  CONSTITUTIONAL: No fever, weight loss, or fatigue  EYES: No eye pain, visual disturbances, or discharge  ENMT:  No difficulty hearing, tinnitus, vertigo; No sinus or throat pain  NECK: No pain or stiffness  BREASTS: No pain, masses, or nipple discharge  RESPIRATORY: No cough, wheezing, chills or hemoptysis; No shortness of breath  CARDIOVASCULAR: No chest pain, palpitations, dizziness, or leg swelling  GASTROINTESTINAL: No abdominal or epigastric pain. No nausea, vomiting, or hematemesis; No diarrhea or constipation. No melena or hematochezia.  GENITOURINARY: No dysuria, frequency, hematuria, or incontinence  NEUROLOGICAL: No headaches, memory loss, loss of strength, numbness, or tremors  SKIN: No itching, burning, rashes, or lesions   LYMPH NODES: No enlarged glands  ENDOCRINE: No heat or cold intolerance; No hair loss; No polydipsia or polyuria  MUSCULOSKELETAL: No back pain  PSYCHIATRIC: No depression, anxiety, mood swings, or difficulty sleeping  HEME/LYMPH: No easy bruising, or bleeding gums  ALLERGY AND IMMUNOLOGIC: No hives or eczema    Vital Signs Last 24 Hrs  T(C): 37 (25 Jul 2022 16:04), Max: 37 (25 Jul 2022 16:04)  T(F): 98.6 (25 Jul 2022 16:04), Max: 98.6 (25 Jul 2022 16:04)  HR: 72 (25 Jul 2022 16:04) (72 - 73)  BP: 131/75 (25 Jul 2022 16:04) (116/77 - 131/75)  BP(mean): --  RR: 18 (25 Jul 2022 16:04) (18 - 18)  SpO2: 98% (25 Jul 2022 16:04) (96% - 98%)    Parameters below as of 25 Jul 2022 16:04  Patient On (Oxygen Delivery Method): nasal cannula        PHYSICAL EXAM:  GENERAL: awake, responsive and not in distress  HEAD:  Atraumatic, Normocephalic  EYES: EOMI, PERRLA, conjunctiva and sclera clear  ENMT: No tonsillar erythema, exudates, or enlargement; Moist mucous membranes, Good dentition, No lesions  NECK: Supple, No JVD, Normal thyroid  NERVOUS SYSTEM:  Alert & Oriented X3, Good concentration; Motor Strength 5/5 B/L upper and lower extremities; DTRs 2+ intact and symmetric  CHEST/LUNG: Clear to auscultation bilaterally; No rales, rhonchi, wheezing, or rubs  HEART: Regular rate and rhythm; No murmurs, rubs, or gallops  ABDOMEN: Soft, Nontender, Nondistended; Bowel sounds present  EXTREMITIES:  UE hands b/l contracture,  LYMPH: No lymphadenopathy noted  SKIN: No rashes or lesions      LABS:                        9.6    8.26  )-----------( 302      ( 25 Jul 2022 07:11 )             30.7     25 Jul 2022 07:14    137    |  95     |  86     ----------------------------<  105    3.5     |  19     |  3.91     Ca    8.2        25 Jul 2022 07:14          CAPILLARY BLOOD GLUCOSE            Cultures  Culture Results:   No growth to date. (07-22-22 @ 13:26)      RADIOLOGY & ADDITIONAL TESTS:    Imaging Personally Reviewed:  [X ] YES  [ ] NO    Consultant(s) Notes Reviewed:  [ X] YES  [ ] NO    Care Discussed with Consultants/Other Providers [X ] YES  [ ] NO

## 2022-07-25 NOTE — PROGRESS NOTE ADULT - ASSESSMENT
A/P: This is a  89 year old female with PMH of Parkinson's disease, HTN, chronic DVT on Eliquis, and hypothyroidism  who was admitted for acute hypoxic respiratory failure most likely due to pneumonia.    Wound team was consulted to assist w/ management of Sacral stage 4 pressure injury, Left plantar foot DTI, Left 4th toe DTI,  Left hip excoriation,   Sacral wound- continue Aquacel dressing QD  Left plantar foot, left 4th toe and left hip, rt heel--recommend applying  left foot blister with betadine daily, and right heel with cavilon daily  - rec z flow boots in bed at all times  BLE elevation  Abx per medicine  Moisturize intact skin w/ SWEEN cream BID  Nutrition Consult for optimization in pt w/ Increased nutritional needs        encourage high quality protein, MVI & Vit deficiencies to promote wound healing  Continue turning and positioning w/ offloading assistive devices as per protocol  Buttocks/ Sacrum: Mar BID and prn soiling //       Continue w/ attends under pads and Pericare as per protocol  Waffle Cushion to chair when oob to chair  Continue w/ low air loss pressure redistribution bed surface   Care as per medicine, will follow w/ you  Upon discharge [facility] [address] [place] [address] [phone]    I spent 25  minutes face to face w/ this pt of which more than 50% of the time was spent counseling & coordinating care of this pt.      Thank you for this consult  Adali Ho [phone]

## 2022-07-25 NOTE — PROGRESS NOTE ADULT - ASSESSMENT
89-Year-old Female PMH Dementia, Parkinson's Disease, Dysphagia, DVT, Muscle Weakness, Gait Instability, Pressure Ulcer, DVT, HTN, HYPOTHYROIDISM, DEPRESSION, PARKINSONS, MOOD DISORDER, DEMENTIA, DECUBITUS ULCERS, SEPSIS, REFLUX, INSOMNIA, CONSTIPATION, HLD who was found hypoxic in SNF last night, she was treated with one dose of IM lasix and IV Zosyn, O2 initially improved but then dropped again to 80s with respiratory distress. She was transferred to [facility] for more evaluation due to acute respiratory failure. She was found with Co2 retention and acidosis. She was started on BIPAP and IV broad abx therapy and was admitted in MICU.     fever- unknown origin. F/u CXR, blood cx, UA, U C&S, RVP panel. COVID so far negative. She was  exposed to positive roommate, on airborne isolation. Continue IV Zosyn for now.   hypernatremia- resolved .   WESTON- getting worse.  Off of Lasix and ACEI/ARBS. D/c Zaroxolyn.  Cre trending up.  D/C foster catheter. Continue IVF.  F/u nephrologist if candidate for dialysis in future.   Acute hypercapnic / hypoxic respiratory failure - s/p intubation on AC, sedated with Fentanyl transition to Precedex  f/u with icu as primary team.  Patient extubated today 7/15/22, tolerating well on RA. Continue to monitor.   Aspiration pneumonia -resolved . WBC trending down. Continue IV Zosyn.  Oxygenation stable.   HF - ? with elevated proBNP, Echo with normal EF. Off of Lasix  due to  worsening Cr.   palliative - discussed with guardian, consulted palliative and ethics committee.   depression/anxiety- at home was on  Escitalopram 10 mg. F/u psych   weight loss- improved on puree diet with supplementary diet   sacral wound stage 4- Continue Collagen topical powder and calcium alginate with zinc oxide to periwound. F/u wound care team. Offload pressure.  HTN- home med Amlodipine 10 mg . D/ C Losartan, due to worsening Cr.   HLD - home meds as Simvastatin 20 mg on hold   urinary retention - home meds as  urecholine TID on hold   DVT ppx- Eliquis 5 mg BID  Parkinson's - Sinemet 25/100 mg TID  Hypothyroidism- Synthroid to 100 mcg.

## 2022-07-25 NOTE — PROGRESS NOTE ADULT - SUBJECTIVE AND OBJECTIVE BOX
Patient is a 89y old  Female who presents with a chief complaint of Shortness of breath (25 Jul 2022 15:13)       INTERVAL HPI/OVERNIGHT EVENTS:  Patient seen and evaluated at bedside.  Pt is resting comfortable in NAD      Allergies    No Known Allergies    Intolerances        Vital Signs Last 24 Hrs  T(C): 37 (25 Jul 2022 16:04), Max: 37 (25 Jul 2022 16:04)  T(F): 98.6 (25 Jul 2022 16:04), Max: 98.6 (25 Jul 2022 16:04)  HR: 72 (25 Jul 2022 16:04) (72 - 73)  BP: 131/75 (25 Jul 2022 16:04) (116/77 - 131/75)  BP(mean): --  RR: 18 (25 Jul 2022 16:04) (18 - 18)  SpO2: 98% (25 Jul 2022 16:04) (96% - 98%)    Parameters below as of 25 Jul 2022 16:04  Patient On (Oxygen Delivery Method): nasal cannula        LABS:                        9.6    8.26  )-----------( 302      ( 25 Jul 2022 07:11 )             30.7     07-25    137  |  95<L>  |  86<H>  ----------------------------<  105<H>  3.5   |  19<L>  |  3.91<H>    Ca    8.2<L>      25 Jul 2022 07:14  Phos  9.4     07-24  Mg     3.2     07-24    TPro  7.0  /  Alb  2.9<L>  /  TBili  0.2  /  DBili  x   /  AST  21  /  ALT  <5<L>  /  AlkPhos  60  07-24        CAPILLARY BLOOD GLUCOSE          Lower Extremity Physical Exam:  Vasular: DP/PT palpable, B/L, CFT <3 seconds B/L except left 4th and 5th digits where CFT is delayed, Temperature gradient WNL, B/L.   Neuro: unable to assess.  Musculoskeletal/Ortho: severely contracted bilat lower extremities   Skin: left forefoot/midfoot plantar blister with deep tissue injury secondary to pressure due to contracted position, right posterior heel DTI secondary to pressure due to contracted position both with no signs of infection, left foot toes 2-5 early ischemic changes worse on left 4th and 5th toes with blister noted at the site extending to plantar forefoot with no signs of infection

## 2022-07-25 NOTE — PROGRESS NOTE ADULT - ASSESSMENT
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 Limited history, obtained through paperwork from nursing home. Patient was suspected to have bilateral pneumonia based on chest X-ray at nursing home yesterday 7/8. On initial presentation patient was afebrile, had non labored breathing on 6L nasal canula, and was AOx1. In the ED, patient was found to be hypercapnic with a PCO2 of 104 and pH of 7.19 off a VBG. Patient was placed on avaps; however she continued to be hypercapnic and acidotic with repeat VBG showing PCO2 of 117 and pH of 7.09. At this time, patient was intubated.      hypernatremia  start d5w 50 cc per hr     ACUTE RENAL FAILURE: will f/u with bun and cr   due to diuresis and hemodynamic arb , decrease po intake , hypotension hypoperfusion non oliguric atn , start iv fluid d5w 50 cc prt hr   dc diuretics   Serum creatinine is increased    There is  progression . No uremic symptoms  No evidence of anemia .  Fluid status stable.  Will continue to avoid nephrotoxic drugs.  Patient remains asymptomatic.   Continue current therapy.  hold   ACE inhibitor.  hold   ARB.        BP monitoring  decrease norvasc 5 mg po qd       ANEMIA PLAN:  Anemia of chronic disease:  We will continue epo aiming for a HCT of 32-36 %.   We will monitor Iron stores, B12 and RBC folate .   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 Limited history, obtained through paperwork from nursing home. Patient was suspected to have bilateral pneumonia based on chest X-ray at nursing home yesterday 7/8. On initial presentation patient was afebrile, had non labored breathing on 6L nasal canula, and was AOx1. In the ED, patient was found to be hypercapnic with a PCO2 of 104 and pH of 7.19 off a VBG. Patient was placed on avaps; however she continued to be hypercapnic and acidotic with repeat VBG showing PCO2 of 117 and pH of 7.09. At this time, patient was intubated.      hypernatremia  start d5w 50 cc per hr     ACUTE RENAL FAILURE: will f/u with bun and cr , hopefully will be a meaningful kidney fx recovery soon ,  as creatinine pay plateau  in am ,    due to diuresis and hemodynamic arb , decrease po intake , hypotension hypoperfusion non oliguric atn , start iv fluid d5w 50 cc prt hr   dc diuretics   Serum creatinine is increased    There is  progression . No uremic symptoms  No evidence of anemia .  Fluid status stable.  Will continue to avoid nephrotoxic drugs.  Patient remains asymptomatic.   Continue current therapy.  hold   ACE inhibitor.  hold   ARB.        BP monitoring  decrease norvasc 5 mg po qd       ANEMIA PLAN:  Anemia of chronic disease:  We will continue epo aiming for a HCT of 32-36 %.   We will monitor Iron stores, B12 and RBC folate .

## 2022-07-25 NOTE — PROGRESS NOTE ADULT - ASSESSMENT
89 y/o female pt with right heel DTI, left foot forefoot and 4th/5th toe blisters   - pt seen and evaluated  - developing ischemic changes on left forefoot  - recommend vascular eval  - rec painting left foot forefoot and toe blisters with betadine daily, and right heel with cavilon daily  - rec z flow boots in bed at all times  - will monitor periodically for signs of progression, reconsult sooner as needed

## 2022-07-25 NOTE — PROGRESS NOTE ADULT - TIME BILLING
Discussed with guardian   regarding advanced care planning  for at least 30 minutes. Reviewed MOLST form and decided to keep it as  full code but consult with ethic committee.

## 2022-07-26 LAB
ANION GAP SERPL CALC-SCNC: 22 MMOL/L — HIGH (ref 5–17)
BUN SERPL-MCNC: 88 MG/DL — HIGH (ref 7–23)
CALCIUM SERPL-MCNC: 8.2 MG/DL — LOW (ref 8.4–10.5)
CHLORIDE SERPL-SCNC: 92 MMOL/L — LOW (ref 96–108)
CO2 SERPL-SCNC: 21 MMOL/L — LOW (ref 22–31)
CREAT SERPL-MCNC: 4.17 MG/DL — HIGH (ref 0.5–1.3)
EGFR: 10 ML/MIN/1.73M2 — LOW
GLUCOSE SERPL-MCNC: 110 MG/DL — HIGH (ref 70–99)
POTASSIUM SERPL-MCNC: 3.4 MMOL/L — LOW (ref 3.5–5.3)
POTASSIUM SERPL-SCNC: 3.4 MMOL/L — LOW (ref 3.5–5.3)
SODIUM SERPL-SCNC: 135 MMOL/L — SIGNIFICANT CHANGE UP (ref 135–145)

## 2022-07-26 RX ORDER — SODIUM CHLORIDE 9 MG/ML
1000 INJECTION, SOLUTION INTRAVENOUS
Refills: 0 | Status: DISCONTINUED | OUTPATIENT
Start: 2022-07-26 | End: 2022-07-27

## 2022-07-26 RX ORDER — POTASSIUM CHLORIDE 20 MEQ
20 PACKET (EA) ORAL ONCE
Refills: 0 | Status: COMPLETED | OUTPATIENT
Start: 2022-07-26 | End: 2022-07-26

## 2022-07-26 RX ADMIN — APIXABAN 2.5 MILLIGRAM(S): 2.5 TABLET, FILM COATED ORAL at 05:23

## 2022-07-26 RX ADMIN — Medication 3 MILLILITER(S): at 23:51

## 2022-07-26 RX ADMIN — CHLORHEXIDINE GLUCONATE 1 APPLICATION(S): 213 SOLUTION TOPICAL at 05:24

## 2022-07-26 RX ADMIN — CARBIDOPA AND LEVODOPA 1 TABLET(S): 25; 100 TABLET ORAL at 15:29

## 2022-07-26 RX ADMIN — Medication 3 MILLILITER(S): at 05:22

## 2022-07-26 RX ADMIN — SODIUM CHLORIDE 50 MILLILITER(S): 9 INJECTION, SOLUTION INTRAVENOUS at 05:24

## 2022-07-26 RX ADMIN — Medication 25 MILLIGRAM(S): at 05:23

## 2022-07-26 RX ADMIN — PIPERACILLIN AND TAZOBACTAM 25 GRAM(S): 4; .5 INJECTION, POWDER, LYOPHILIZED, FOR SOLUTION INTRAVENOUS at 15:25

## 2022-07-26 RX ADMIN — CARBIDOPA AND LEVODOPA 1 TABLET(S): 25; 100 TABLET ORAL at 05:23

## 2022-07-26 RX ADMIN — PIPERACILLIN AND TAZOBACTAM 25 GRAM(S): 4; .5 INJECTION, POWDER, LYOPHILIZED, FOR SOLUTION INTRAVENOUS at 02:17

## 2022-07-26 RX ADMIN — Medication 25 MILLIGRAM(S): at 15:29

## 2022-07-26 RX ADMIN — Medication 3 MILLILITER(S): at 18:20

## 2022-07-26 RX ADMIN — APIXABAN 2.5 MILLIGRAM(S): 2.5 TABLET, FILM COATED ORAL at 18:20

## 2022-07-26 RX ADMIN — Medication 3 MILLILITER(S): at 12:13

## 2022-07-26 RX ADMIN — Medication 20 MILLIEQUIVALENT(S): at 15:25

## 2022-07-26 RX ADMIN — Medication 100 MICROGRAM(S): at 05:23

## 2022-07-26 RX ADMIN — CARBIDOPA AND LEVODOPA 1 TABLET(S): 25; 100 TABLET ORAL at 21:57

## 2022-07-26 NOTE — PROGRESS NOTE ADULT - ASSESSMENT
89-Year-old Female PMH Dementia, Parkinson's Disease, Dysphagia, DVT, Muscle Weakness, Gait Instability, Pressure Ulcer, DVT, HTN, HYPOTHYROIDISM, DEPRESSION, PARKINSONS, MOOD DISORDER, DEMENTIA, DECUBITUS ULCERS, SEPSIS, REFLUX, INSOMNIA, CONSTIPATION, HLD who was found hypoxic in SNF last night, she was treated with one dose of IM lasix and IV Zosyn, O2 initially improved but then dropped again to 80s with respiratory distress. She was transferred to [facility] for more evaluation due to acute respiratory failure. She was found with Co2 retention and acidosis. She was started on BIPAP and IV broad abx therapy and was admitted in MICU.     fever- unknown origin. F/u CXR, blood cx, UA, U C&S, RVP panel. COVID so far negative. She was  exposed to positive roommate, on airborne isolation. Continue IV Zosyn for now.   hypernatremia- resolved .   WESTON- Cre getting worse.  Off of Lasix and ACEI/ARBS. D/c Zaroxolyn.  Cre trending up.  D/C foster catheter. Continue IVF.  F/u nephrologist  for any indication for dialysis.    Acute hypercapnic / hypoxic respiratory failure - s/p intubation on AC, sedated with Fentanyl transition to Precedex  f/u with icu as primary team.  Patient extubated today 7/15/22, tolerating well on RA. Continue to monitor.   Aspiration pneumonia -resolved . WBC trending down. Continue IV Zosyn.  Oxygenation stable.   HF - ? with elevated proBNP, Echo with normal EF. Off of Lasix  due to  worsening Cr.   palliative - discussed with guardian, consulted palliative and ethics committee.   depression/anxiety- at home was on  Escitalopram 10 mg. F/u psych   weight loss- improved on puree diet with supplementary diet   sacral wound stage 4- Continue Collagen topical powder and calcium alginate with zinc oxide to periwound. F/u wound care team. Offload pressure.  HTN- home med Amlodipine 10 mg . D/ C Losartan, due to worsening Cr.   HLD - home meds as Simvastatin 20 mg on hold   urinary retention - home meds as  urecholine TID on hold   DVT ppx- Eliquis 5 mg BID  Parkinson's - Sinemet 25/100 mg TID  Hypothyroidism- Synthroid to 100 mcg.

## 2022-07-26 NOTE — PROGRESS NOTE ADULT - SUBJECTIVE AND OBJECTIVE BOX
Patient is a 89y old  Female who presents with a chief complaint of Shortness of breath (25 Jul 2022 17:26)      INTERVAL HPI/OVERNIGHT EVENTS: No event overnight. Cre still rising above 4. She has good urine output. Still on IVF D5 W 50 cc/hr. F/u nephrology. Spoke with palliative again apparently they tried to reach out to guardian who did not answer the phone. At this time nephrologist is not offering dialysis yet but for 2PC  changing code status is not  enough yet. F/u nephrologist for her renal function which is getting worse day by day. Left foot has some bruises and DTI and blister on L foot toes.. F/u wound care team.     Pain Location & Control: OK    MEDICATIONS  (STANDING):  ALBUTerol    90 MICROgram(s) HFA Inhaler      albuterol/ipratropium for Nebulization 3 milliLiter(s) Nebulizer every 6 hours  apixaban 2.5 milliGRAM(s) Oral every 12 hours  bethanechol 25 milliGRAM(s) Oral three times a day  carbidopa/levodopa  25/100 1 Tablet(s) Oral three times a day  chlorhexidine 4% Liquid 1 Application(s) Topical <User Schedule>  dextrose 5%. 1000 milliLiter(s) (50 mL/Hr) IV Continuous <Continuous>  epoetin jus-epbx (RETACRIT) Injectable 15233 Unit(s) SubCutaneous <User Schedule>  levothyroxine 100 MICROGram(s) Oral daily  piperacillin/tazobactam IVPB.. 3.375 Gram(s) IV Intermittent every 12 hours    MEDICATIONS  (PRN):  acetaminophen     Tablet .. 650 milliGRAM(s) Oral every 6 hours PRN Temp greater or equal to 38C (100.4F), Mild Pain (1 - 3)  melatonin 3 milliGRAM(s) Oral at bedtime PRN Insomnia      Allergies    No Known Allergies    Intolerances        REVIEW OF SYSTEMS:  CONSTITUTIONAL: No fever, weight loss, or fatigue  EYES: No eye pain, visual disturbances, or discharge  ENMT:  No difficulty hearing, tinnitus, vertigo; No sinus or throat pain  NECK: No pain or stiffness  BREASTS: No pain, masses, or nipple discharge  RESPIRATORY: No cough, wheezing, chills or hemoptysis; No shortness of breath  CARDIOVASCULAR: No chest pain, palpitations, dizziness, or leg swelling  GASTROINTESTINAL: No abdominal or epigastric pain. No nausea, vomiting, or hematemesis; No diarrhea or constipation. No melena or hematochezia.  GENITOURINARY: No dysuria, frequency, hematuria, or incontinence  NEUROLOGICAL: No headaches, memory loss, loss of strength, numbness, or tremors  SKIN: No itching, burning, rashes, or lesions   LYMPH NODES: No enlarged glands  ENDOCRINE: No heat or cold intolerance; No hair loss; No polydipsia or polyuria  MUSCULOSKELETAL: No back pain  PSYCHIATRIC: No depression, anxiety, mood swings, or difficulty sleeping  HEME/LYMPH: No easy bruising, or bleeding gums  ALLERGY AND IMMUNOLOGIC: No hives or eczema    Vital Signs Last 24 Hrs  T(C): 36.4 (26 Jul 2022 16:42), Max: 37 (25 Jul 2022 19:44)  T(F): 97.6 (26 Jul 2022 16:42), Max: 98.6 (25 Jul 2022 19:44)  HR: 75 (26 Jul 2022 16:42) (66 - 76)  BP: 113/65 (26 Jul 2022 16:42) (104/62 - 130/70)  BP(mean): --  RR: 18 (26 Jul 2022 16:42) (18 - 18)  SpO2: 95% (26 Jul 2022 16:42) (94% - 95%)    Parameters below as of 26 Jul 2022 16:42  Patient On (Oxygen Delivery Method): nasal cannula  O2 Flow (L/min): 2      PHYSICAL EXAM:  GENERAL: NAD, well-groomed, well-developed  HEAD:  Atraumatic, Normocephalic  EYES: EOMI, PERRLA, conjunctiva and sclera clear  ENMT: No tonsillar erythema, exudates, or enlargement; Moist mucous membranes, Good dentition, No lesions  NECK: Supple, No JVD, Normal thyroid  NERVOUS SYSTEM:  Alert & Oriented X3, Good concentration; Motor Strength 5/5 B/L upper and lower extremities; DTRs 2+ intact and symmetric  CHEST/LUNG: Clear to auscultation bilaterally; No rales, rhonchi, wheezing, or rubs  HEART: Regular rate and rhythm; No murmurs, rubs, or gallops  ABDOMEN: Soft, Nontender, Nondistended; Bowel sounds present  EXTREMITIES:  2+ Peripheral Pulses, No clubbing or cyanosis  LYMPH: No lymphadenopathy noted  SKIN: No rashes or lesion    LABS:    26 Jul 2022 10:59    135    |  92     |  88     ----------------------------<  110    3.4     |  21     |  4.17     Ca    8.2        26 Jul 2022 10:59          CAPILLARY BLOOD GLUCOSE            Cultures  Culture Results:   No growth to date. (07-22-22 @ 13:26)      RADIOLOGY & ADDITIONAL TESTS:    Imaging Personally Reviewed:  [X ] YES  [ ] NO    Consultant(s) Notes Reviewed:  [X ] YES  [ ] NO    Care Discussed with Consultants/Other Providers [X ] YES  [ ] NO Patient is a 89y old  Female who presents with a chief complaint of Shortness of breath (25 Jul 2022 17:26)      INTERVAL HPI/OVERNIGHT EVENTS: No event overnight. Cre still rising above 4. She has good urine output. Still on IVF D5 W 50 cc/hr. F/u nephrology. Spoke with palliative again apparently they tried to reach out to guardian who did not answer the phone. At this time nephrologist is not offering dialysis yet but for 2PC  changing code status is not  enough yet. F/u nephrologist for her renal function which is getting worse day by day. Left foot has some bruises and DTI and blister on L foot toes.. F/u wound care team.     Pain Location & Control: OK    MEDICATIONS  (STANDING):  ALBUTerol    90 MICROgram(s) HFA Inhaler      albuterol/ipratropium for Nebulization 3 milliLiter(s) Nebulizer every 6 hours  apixaban 2.5 milliGRAM(s) Oral every 12 hours  bethanechol 25 milliGRAM(s) Oral three times a day  carbidopa/levodopa  25/100 1 Tablet(s) Oral three times a day  chlorhexidine 4% Liquid 1 Application(s) Topical <User Schedule>  dextrose 5%. 1000 milliLiter(s) (50 mL/Hr) IV Continuous <Continuous>  epoetin jus-epbx (RETACRIT) Injectable 65003 Unit(s) SubCutaneous <User Schedule>  levothyroxine 100 MICROGram(s) Oral daily  piperacillin/tazobactam IVPB.. 3.375 Gram(s) IV Intermittent every 12 hours    MEDICATIONS  (PRN):  acetaminophen     Tablet .. 650 milliGRAM(s) Oral every 6 hours PRN Temp greater or equal to 38C (100.4F), Mild Pain (1 - 3)  melatonin 3 milliGRAM(s) Oral at bedtime PRN Insomnia      Allergies    No Known Allergies    Intolerances        REVIEW OF SYSTEMS:  CONSTITUTIONAL: No fever, weight loss, or fatigue  EYES: No eye pain, visual disturbances, or discharge  ENMT:  No difficulty hearing, tinnitus, vertigo; No sinus or throat pain  NECK: No pain or stiffness  BREASTS: No pain, masses, or nipple discharge  RESPIRATORY: No cough, wheezing, chills or hemoptysis; No shortness of breath  CARDIOVASCULAR: No chest pain, palpitations, dizziness, or leg swelling  GASTROINTESTINAL: No abdominal or epigastric pain. No nausea, vomiting, or hematemesis; No diarrhea or constipation. No melena or hematochezia.  GENITOURINARY: No dysuria, frequency, hematuria, or incontinence  NEUROLOGICAL: No headaches, memory loss, loss of strength, numbness, or tremors  SKIN: No itching, burning, rashes, or lesions   LYMPH NODES: No enlarged glands  ENDOCRINE: No heat or cold intolerance; No hair loss; No polydipsia or polyuria  MUSCULOSKELETAL: No back pain  PSYCHIATRIC: No depression, anxiety, mood swings, or difficulty sleeping  HEME/LYMPH: No easy bruising, or bleeding gums  ALLERGY AND IMMUNOLOGIC: No hives or eczema    Vital Signs Last 24 Hrs  T(C): 36.4 (26 Jul 2022 16:42), Max: 37 (25 Jul 2022 19:44)  T(F): 97.6 (26 Jul 2022 16:42), Max: 98.6 (25 Jul 2022 19:44)  HR: 75 (26 Jul 2022 16:42) (66 - 76)  BP: 113/65 (26 Jul 2022 16:42) (104/62 - 130/70)  BP(mean): --  RR: 18 (26 Jul 2022 16:42) (18 - 18)  SpO2: 95% (26 Jul 2022 16:42) (94% - 95%)    Parameters below as of 26 Jul 2022 16:42  Patient On (Oxygen Delivery Method): nasal cannula  O2 Flow (L/min): 2      PHYSICAL EXAM:  GENERAL: NAD, well-groomed, well-developed  HEAD:  Atraumatic, Normocephalic  EYES: EOMI, PERRLA, conjunctiva and sclera clear  ENMT: No tonsillar erythema, exudates, or enlargement; Moist mucous membranes, Good dentition, No lesions  NECK: Supple, No JVD, Normal thyroid  NERVOUS SYSTEM:  Alert & Oriented X1, LE weakness   CHEST/LUNG: Clear to auscultation bilaterally; No rales, rhonchi, wheezing, or rubs  HEART: Regular rate and rhythm; No murmurs, rubs, or gallops  ABDOMEN: Soft, Nontender, Nondistended; Bowel sounds present  EXTREMITIES:  2+ Peripheral Pulses, No clubbing or cyanosis  LYMPH: No lymphadenopathy noted  SKIN: left foot bruise    LABS:    26 Jul 2022 10:59    135    |  92     |  88     ----------------------------<  110    3.4     |  21     |  4.17     Ca    8.2        26 Jul 2022 10:59          CAPILLARY BLOOD GLUCOSE            Cultures  Culture Results:   No growth to date. (07-22-22 @ 13:26)      RADIOLOGY & ADDITIONAL TESTS:    Imaging Personally Reviewed:  [X ] YES  [ ] NO    Consultant(s) Notes Reviewed:  [X ] YES  [ ] NO    Care Discussed with Consultants/Other Providers [X ] YES  [ ] NO

## 2022-07-26 NOTE — PROGRESS NOTE ADULT - ASSESSMENT
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 Limited history, obtained through paperwork from nursing home. Patient was suspected to have bilateral pneumonia based on chest X-ray at nursing home yesterday 7/8. On initial presentation patient was afebrile, had non labored breathing on 6L nasal canula, and was AOx1. In the ED, patient was found to be hypercapnic with a PCO2 of 104 and pH of 7.19 off a VBG. Patient was placed on avaps; however she continued to be hypercapnic and acidotic with repeat VBG showing PCO2 of 117 and pH of 7.09. At this time, patient was intubated.      hypernatremia  start d5w 50 cc per hr     ACUTE RENAL FAILURE: due to diuresis and hemodynamic arb , decrease po intake , hypotension hypoperfusion non oliguric atn , also r/o TN dc zosyn , start Levaquin    dc diuretics   Serum creatinine is increased    There is  progression . No uremic symptoms  Patient remains asymptomatic.   Continue current therapy.  hold   ACE inhibitor.  hold   ARB.  Additional evaluation:   ECG,    echocardiogram,     CXR,  will obtained recent   renal ultrasound to evalaute kidney size and possible stones , if cr is not improving       f/u  blood and urine cx,serial lactate levels,monitor vitals closley,ivfs hydration,monitor urine output and renal profile,iv abx as per id cons        BP monitoring  decrease norvasc 5 mg po qd

## 2022-07-26 NOTE — CHART NOTE - NSCHARTNOTEFT_GEN_A_CORE
Case discussed with Dr. Huang and Ethics fellow.  Awaiting input from nephrology re: ?recoverable renal function vs. candidacy for dialysis as this would help to define further options for patient care.  I have spoke with guardian, Evaristo Reddy, this morning. She states she is deferring decision to the medical team at this time and will no longer function as guardian for this patient.   In terms of advance directives and further medical plan of care, we will defer to the Family Health Care Decisions Act, section 2994-g.  Will continue to follow this case. [phone]

## 2022-07-26 NOTE — PROGRESS NOTE ADULT - SUBJECTIVE AND OBJECTIVE BOX
Patient is a 89y Female whom presented to the hospital with hypernatremia      PAST MEDICAL & SURGICAL HISTORY:  HTN (hypertension)      Parkinsons      HLD (hyperlipidemia)      DVT, lower extremity      Hypothyroid      No significant past surgical history          MEDICATIONS  (STANDING):  ALBUTerol    90 MICROgram(s) HFA Inhaler      albuterol/ipratropium for Nebulization 3 milliLiter(s) Nebulizer every 6 hours  amLODIPine   Tablet 10 milliGRAM(s) Oral daily  apixaban 5 milliGRAM(s) Oral every 12 hours  ascorbic acid 500 milliGRAM(s) Oral daily  bethanechol 25 milliGRAM(s) Oral three times a day  carbidopa/levodopa  25/100 1 Tablet(s) Oral three times a day  chlorhexidine 4% Liquid 1 Application(s) Topical <User Schedule>  dextrose 5%. 1000 milliLiter(s) (50 mL/Hr) IV Continuous <Continuous>  furosemide    Tablet 40 milliGRAM(s) Oral daily  levothyroxine 100 MICROGram(s) Oral daily  losartan 50 milliGRAM(s) Oral daily  metolazone 5 milliGRAM(s) Oral daily  multivitamin 1 Tablet(s) Oral daily      Allergies    No Known Allergies    Intolerances  [facility]      SOCIAL HISTORY:  Denies ETOh,Smoking,     FAMILY HISTORY:      REVIEW OF SYSTEMS:    CONSTITUTIONAL: No weakness, fevers or chills  RESPIRATORY: No cough, wheezing, hemoptysis; No shortness of breath  CARDIOVASCULAR: No chest pain or palpitations  GASTROINTESTINAL: No abdominal or epigastric pain. No nausea, vomiting,   GENITOURINARY: No dysuria, frequency or hematuria  SKIN: dry                                                                                                 9.6    8.26  )-----------( 302      ( 25 Jul 2022 07:11 )             30.7       CBC Full  -  ( 25 Jul 2022 07:11 )  WBC Count : 8.26 K/uL  RBC Count : 3.20 M/uL  Hemoglobin : 9.6 g/dL  Hematocrit : 30.7 %  Platelet Count - Automated : 302 K/uL  Mean Cell Volume : 95.9 fl  Mean Cell Hemoglobin : 30.0 pg  Mean Cell Hemoglobin Concentration : 31.3 gm/dL  Auto Neutrophil # : 6.20 K/uL  Auto Lymphocyte # : 0.88 K/uL  Auto Monocyte # : 0.63 K/uL  Auto Eosinophil # : 0.48 K/uL  Auto Basophil # : 0.04 K/uL  Auto Neutrophil % : 75.0 %  Auto Lymphocyte % : 10.7 %  Auto Monocyte % : 7.6 %  Auto Eosinophil % : 5.8 %  Auto Basophil % : 0.5 %      07-26    135  |  92<L>  |  88<H>  ----------------------------<  110<H>  3.4<L>   |  21<L>  |  4.17<H>    Ca    8.2<L>      26 Jul 2022 10:59        CAPILLARY BLOOD GLUCOSE          Vital Signs Last 24 Hrs  T(C): 36.4 (26 Jul 2022 16:42), Max: 36.9 (26 Jul 2022 00:30)  T(F): 97.6 (26 Jul 2022 16:42), Max: 98.5 (26 Jul 2022 12:00)  HR: 75 (26 Jul 2022 16:42) (66 - 75)  BP: 113/65 (26 Jul 2022 16:42) (110/60 - 130/70)  BP(mean): --  RR: 18 (26 Jul 2022 16:42) (18 - 18)  SpO2: 95% (26 Jul 2022 16:42) (94% - 95%)    Parameters below as of 26 Jul 2022 16:42  Patient On (Oxygen Delivery Method): nasal cannula  O2 Flow (L/min): 2                            PHYSICAL EXAM:    Constitutional: NAD  HEENT: conjunctive   clear   Neck:  No JVD  Respiratory: CTAB  Cardiovascular: S1 and S2  Gastrointestinal: BS+, soft, NT/ND  Extremities: No peripheral edema

## 2022-07-27 LAB
ANION GAP SERPL CALC-SCNC: 20 MMOL/L — HIGH (ref 5–17)
BUN SERPL-MCNC: 86 MG/DL — HIGH (ref 7–23)
CALCIUM SERPL-MCNC: 7.7 MG/DL — LOW (ref 8.4–10.5)
CHLORIDE SERPL-SCNC: 91 MMOL/L — LOW (ref 96–108)
CO2 SERPL-SCNC: 21 MMOL/L — LOW (ref 22–31)
CREAT SERPL-MCNC: 4.31 MG/DL — HIGH (ref 0.5–1.3)
CULTURE RESULTS: SIGNIFICANT CHANGE UP
EGFR: 9 ML/MIN/1.73M2 — LOW
GLUCOSE SERPL-MCNC: 120 MG/DL — HIGH (ref 70–99)
POTASSIUM SERPL-MCNC: 3.2 MMOL/L — LOW (ref 3.5–5.3)
POTASSIUM SERPL-SCNC: 3.2 MMOL/L — LOW (ref 3.5–5.3)
SODIUM SERPL-SCNC: 132 MMOL/L — LOW (ref 135–145)
SPECIMEN SOURCE: SIGNIFICANT CHANGE UP

## 2022-07-27 RX ORDER — POTASSIUM CHLORIDE 20 MEQ
20 PACKET (EA) ORAL ONCE
Refills: 0 | Status: COMPLETED | OUTPATIENT
Start: 2022-07-27 | End: 2022-07-27

## 2022-07-27 RX ORDER — SODIUM CHLORIDE 9 MG/ML
1000 INJECTION INTRAMUSCULAR; INTRAVENOUS; SUBCUTANEOUS
Refills: 0 | Status: DISCONTINUED | OUTPATIENT
Start: 2022-07-27 | End: 2022-07-29

## 2022-07-27 RX ORDER — POTASSIUM CHLORIDE 20 MEQ
40 PACKET (EA) ORAL ONCE
Refills: 0 | Status: COMPLETED | OUTPATIENT
Start: 2022-07-27 | End: 2022-07-27

## 2022-07-27 RX ADMIN — APIXABAN 2.5 MILLIGRAM(S): 2.5 TABLET, FILM COATED ORAL at 18:41

## 2022-07-27 RX ADMIN — Medication 3 MILLILITER(S): at 05:03

## 2022-07-27 RX ADMIN — Medication 3 MILLILITER(S): at 23:24

## 2022-07-27 RX ADMIN — Medication 100 MICROGRAM(S): at 05:03

## 2022-07-27 RX ADMIN — Medication 3 MILLILITER(S): at 18:46

## 2022-07-27 RX ADMIN — SODIUM CHLORIDE 30 MILLILITER(S): 9 INJECTION, SOLUTION INTRAVENOUS at 00:43

## 2022-07-27 RX ADMIN — APIXABAN 2.5 MILLIGRAM(S): 2.5 TABLET, FILM COATED ORAL at 05:04

## 2022-07-27 RX ADMIN — Medication 3 MILLILITER(S): at 12:17

## 2022-07-27 RX ADMIN — ERYTHROPOIETIN 20000 UNIT(S): 10000 INJECTION, SOLUTION INTRAVENOUS; SUBCUTANEOUS at 10:17

## 2022-07-27 RX ADMIN — Medication 40 MILLIEQUIVALENT(S): at 13:50

## 2022-07-27 RX ADMIN — CARBIDOPA AND LEVODOPA 1 TABLET(S): 25; 100 TABLET ORAL at 05:04

## 2022-07-27 RX ADMIN — SODIUM CHLORIDE 45 MILLILITER(S): 9 INJECTION INTRAMUSCULAR; INTRAVENOUS; SUBCUTANEOUS at 18:41

## 2022-07-27 RX ADMIN — CARBIDOPA AND LEVODOPA 1 TABLET(S): 25; 100 TABLET ORAL at 22:10

## 2022-07-27 RX ADMIN — CHLORHEXIDINE GLUCONATE 1 APPLICATION(S): 213 SOLUTION TOPICAL at 05:04

## 2022-07-27 RX ADMIN — Medication 20 MILLIEQUIVALENT(S): at 18:46

## 2022-07-27 RX ADMIN — CARBIDOPA AND LEVODOPA 1 TABLET(S): 25; 100 TABLET ORAL at 12:18

## 2022-07-27 NOTE — CONSULT NOTE ADULT - CONSULT REQUESTED DATE/TIME
26-Jul-2022
18-Jul-2022 15:15
09-Jul-2022
11-Jul-2022 12:29
20-Jul-2022 17:38
13-Jul-2022 17:01
Declines

## 2022-07-27 NOTE — PROGRESS NOTE ADULT - SUBJECTIVE AND OBJECTIVE BOX
Patient is a 89y Female whom presented to the hospital with hypernatremia      PAST MEDICAL & SURGICAL HISTORY:  HTN (hypertension)      Parkinsons      HLD (hyperlipidemia)      DVT, lower extremity      Hypothyroid      No significant past surgical history          MEDICATIONS  (STANDING):  ALBUTerol    90 MICROgram(s) HFA Inhaler      albuterol/ipratropium for Nebulization 3 milliLiter(s) Nebulizer every 6 hours  amLODIPine   Tablet 10 milliGRAM(s) Oral daily  apixaban 5 milliGRAM(s) Oral every 12 hours  ascorbic acid 500 milliGRAM(s) Oral daily  bethanechol 25 milliGRAM(s) Oral three times a day  carbidopa/levodopa  25/100 1 Tablet(s) Oral three times a day  chlorhexidine 4% Liquid 1 Application(s) Topical <User Schedule>  dextrose 5%. 1000 milliLiter(s) (50 mL/Hr) IV Continuous <Continuous>  furosemide    Tablet 40 milliGRAM(s) Oral daily  levothyroxine 100 MICROGram(s) Oral daily  losartan 50 milliGRAM(s) Oral daily  metolazone 5 milliGRAM(s) Oral daily  multivitamin 1 Tablet(s) Oral daily      Allergies    No Known Allergies    Intolerances  [facility]      SOCIAL HISTORY:  Denies ETOh,Smoking,     FAMILY HISTORY:      REVIEW OF SYSTEMS:    CONSTITUTIONAL: No weakness, fevers or chills  RESPIRATORY: No cough, wheezing, hemoptysis; No shortness of breath  CARDIOVASCULAR: No chest pain or palpitations  GASTROINTESTINAL: No abdominal or epigastric pain. No nausea, vomiting,   GENITOURINARY: No dysuria, frequency or hematuria  SKIN: dry                                                                                                       07-27    132<L>  |  91<L>  |  86<H>  ----------------------------<  120<H>  3.2<L>   |  21<L>  |  4.31<H>    Ca    7.7<L>      27 Jul 2022 11:10        CAPILLARY BLOOD GLUCOSE          Vital Signs Last 24 Hrs  T(C): 37.4 (27 Jul 2022 16:52), Max: 37.4 (27 Jul 2022 16:52)  T(F): 99.3 (27 Jul 2022 16:52), Max: 99.3 (27 Jul 2022 16:52)  HR: 83 (27 Jul 2022 16:52) (74 - 83)  BP: 117/60 (27 Jul 2022 16:52) (117/60 - 124/66)  BP(mean): --  RR: 18 (27 Jul 2022 16:52) (18 - 18)  SpO2: 90% (27 Jul 2022 16:52) (90% - 97%)    Parameters below as of 27 Jul 2022 16:52  Patient On (Oxygen Delivery Method): nasal cannula  O2 Flow (L/min): 2                      PHYSICAL EXAM:    Constitutional: NAD  HEENT: conjunctive   clear   Neck:  No JVD  Respiratory: CTAB  Cardiovascular: S1 and S2  Gastrointestinal: BS+, soft, NT/ND  Extremities: No peripheral edema

## 2022-07-27 NOTE — PROGRESS NOTE ADULT - SUBJECTIVE AND OBJECTIVE BOX
Patient is a 89y old  Female who presents with a chief complaint of Shortness of breath (26 Jul 2022 20:00)      INTERVAL HPI/OVERNIGHT EVENTS: No event overnight, stable. Cre getting worse  F/u nephrologist. Continue IVF. ZOsyn discontinued yesterday due to some concern may have nephrotoxicity effect. . Monitor renal fucntion. Started on Levaquin for two more days.      Pain Location & Control:  OK    MEDICATIONS  (STANDING):  ALBUTerol    90 MICROgram(s) HFA Inhaler      albuterol/ipratropium for Nebulization 3 milliLiter(s) Nebulizer every 6 hours  apixaban 2.5 milliGRAM(s) Oral every 12 hours  carbidopa/levodopa  25/100 1 Tablet(s) Oral three times a day  chlorhexidine 4% Liquid 1 Application(s) Topical <User Schedule>  epoetin jus-epbx (RETACRIT) Injectable 68253 Unit(s) SubCutaneous <User Schedule>  levoFLOXacin IVPB 250 milliGRAM(s) IV Intermittent every 24 hours  levothyroxine 100 MICROGram(s) Oral daily  potassium chloride   Powder 20 milliEquivalent(s) Oral once  sodium chloride 0.45%. 1000 milliLiter(s) (30 mL/Hr) IV Continuous <Continuous>    MEDICATIONS  (PRN):  acetaminophen     Tablet .. 650 milliGRAM(s) Oral every 6 hours PRN Temp greater or equal to 38C (100.4F), Mild Pain (1 - 3)      Allergies    No Known Allergies    Intolerances        REVIEW OF SYSTEMS:  CONSTITUTIONAL: No fever, weight loss, or fatigue  EYES: No eye pain, visual disturbances, or discharge  ENMT:  No difficulty hearing, tinnitus, vertigo; No sinus or throat pain  NECK: No pain or stiffness  BREASTS: No pain, masses, or nipple discharge  RESPIRATORY: No cough, wheezing, chills or hemoptysis; No shortness of breath  CARDIOVASCULAR: No chest pain, palpitations, dizziness, or leg swelling  GASTROINTESTINAL: No abdominal or epigastric pain. No nausea, vomiting, or hematemesis; No diarrhea or constipation. No melena or hematochezia.  GENITOURINARY: No dysuria, frequency, hematuria, or incontinence  NEUROLOGICAL: No headaches, memory loss, loss of strength, numbness, or tremors  SKIN: No itching, burning, rashes, or lesions   LYMPH NODES: No enlarged glands  ENDOCRINE: No heat or cold intolerance; No hair loss; No polydipsia or polyuria  MUSCULOSKELETAL: No back pain  PSYCHIATRIC: No depression, anxiety, mood swings, or difficulty sleeping  HEME/LYMPH: No easy bruising, or bleeding gums  ALLERGY AND IMMUNOLOGIC: No hives or eczema    Vital Signs Last 24 Hrs  T(C): 37.3 (27 Jul 2022 09:00), Max: 37.3 (27 Jul 2022 09:00)  T(F): 99.1 (27 Jul 2022 09:00), Max: 99.1 (27 Jul 2022 09:00)  HR: 82 (27 Jul 2022 09:00) (74 - 82)  BP: 124/66 (27 Jul 2022 09:00) (113/65 - 124/66)  BP(mean): --  RR: 18 (27 Jul 2022 09:00) (18 - 18)  SpO2: 97% (27 Jul 2022 09:00) (95% - 97%)    Parameters below as of 27 Jul 2022 07:34  Patient On (Oxygen Delivery Method): nasal cannula        PHYSICAL EXAM:  GENERAL: NAD, well-groomed, well-developed  HEAD:  Atraumatic, Normocephalic  EYES: EOMI, PERRLA, conjunctiva and sclera clear  ENMT: No tonsillar erythema, exudates, or enlargement; Moist mucous membranes, Good dentition, No lesions  NECK: Supple, No JVD, Normal thyroid  NERVOUS SYSTEM:  Alert & Oriented X2, Good concentration; Motor Strength 5/5 B/L upper and lower extremities; DTRs 2+ intact and symmetric  CHEST/LUNG: Clear to auscultation bilaterally; No rales, rhonchi, wheezing, or rubs  HEART: Regular rate and rhythm; No murmurs, rubs, or gallops  ABDOMEN: Soft, Nontender, Nondistended; Bowel sounds present  EXTREMITIES:  2+ Peripheral Pulses, No clubbing or cyanosis, b/l leg contracture  LYMPH: No lymphadenopathy noted  SKIN: L foot bruise and blister     LABS:    27 Jul 2022 11:10    132    |  91     |  86     ----------------------------<  120    3.2     |  21     |  4.31     Ca    7.7        27 Jul 2022 11:10          CAPILLARY BLOOD GLUCOSE            Cultures  Culture Results:   No growth to date. (07-22-22 @ 13:26)      RADIOLOGY & ADDITIONAL TESTS:    Imaging Personally Reviewed:  [ X] YES  [ ] NO    Consultant(s) Notes Reviewed:  [ X] YES  [ ] NO    Care Discussed with Consultants/Other Providers [ X] YES  [ ] NO

## 2022-07-27 NOTE — CONSULT NOTE ADULT - ASSESSMENT
Recommendation: The patient in this case is not yet considered to be terminally ill nor critical. Under the Family Health Care Decisions Act for patients without capacity and without health care proxy, two physicians can determine advance care planning in the setting of a critically ill patient. Until that time, patient should continue to receive all care and have all possible treatment options explored.     Thank you for including the Ethics Consultation Service.  Ethics commends the clinical team for the continued care and support for Ms. Cheatham during this challenging time.     Ethics will remain available and will follow this case closely.  Please do not hesitate to call us if there are any questions.    CASE DISCUSSED WITH LITTLE EDWARD MS, MD, MPH, [facility]- MEDICAL ETHICIST  MORE THAN 50% OF THE TIME OF THIS CONSULTATION WAS SPENT IN COORDINATION OF CARE OF PATIENT  References  (1) Davis FELTON. The Family Health Care Decisions Act. [facility] Health Law Journal. 2010;15:32-35.      Recommendation: The patient in this case is not yet considered to be terminally ill nor critical. Under the Family Health Care Decisions Act for patients without capacity and without health care proxy, two physicians can determine advance care planning in the setting of a critically ill patient. Until that time, patient should continue to receive all routine medical and intensive care that is deemed appropriate and have all possible treatment options explored.       Thank you for including the Ethics Consultation Service.  Ethics commends the clinical team for the continued care and support for Ms. Cheatham during this challenging time.     Ethics will remain available and will follow this case closely.  Please do not hesitate to call us if there are any questions.    CASE DISCUSSED WITH LITTLE EDWARD MS, MD, MPH, [facility] [address] MEDICAL ETHICIST  MORE THAN 50% OF THE TIME OF THIS CONSULTATION WAS SPENT IN COORDINATION OF CARE OF PATIENT  References  (1) Davis FELTON. The Family Health Care Decisions Act. [facility] Health Law Journal. 2010;15:32-35.

## 2022-07-27 NOTE — CHART NOTE - NSCHARTNOTEFT_GEN_A_CORE
Chart reviewed, serum creatinine rising.  Awaiting nephrology input regarding renal prognosis/recovery and/or candidacy for dialysis to help dictate next steps in care  At this time, guardian is deferring to 2PC. This can be instituted for emergent needs at this time.  In regards to withholding or withdrawing life sustaining treatment in the setting of patient without capacity who lacks a surrogate, we will refer to [facility] 2994-g. will continue to follow.  [phone]

## 2022-07-27 NOTE — PROGRESS NOTE ADULT - ASSESSMENT
89-Year-old Female PMH Dementia, Parkinson's Disease, Dysphagia, DVT, Muscle Weakness, Gait Instability, Pressure Ulcer, DVT, HTN, HYPOTHYROIDISM, DEPRESSION, PARKINSONS, MOOD DISORDER, DEMENTIA, DECUBITUS ULCERS, SEPSIS, REFLUX, INSOMNIA, CONSTIPATION, HLD who was found hypoxic in SNF last night, she was treated with one dose of IM lasix and IV Zosyn, O2 initially improved but then dropped again to 80s with respiratory distress. She was transferred to [facility] for more evaluation due to acute respiratory failure. She was found with Co2 retention and acidosis. She was started on BIPAP and IV broad abx therapy and was admitted in MICU.     fever- unknown origin. F/u CXR, blood cx, UA, U C&S, RVP panel. COVID so far negative. She was  exposed to positive roommate, on airborne isolation. Continue IV Zosyn for now.   hypernatremia- resolved .   WESTON- Cre getting worse.  Off of Lasix and ACEI/ARBS. D/c Zaroxolyn.  Cre trending up.  D/C foster catheter. Continue IVF.  F/u nephrologist  for any indication for dialysis.    Acute hypercapnic / hypoxic respiratory failure - s/p intubation on AC, sedated with Fentanyl transition to Precedex  f/u with icu as primary team.  Patient extubated today 7/15/22, tolerating well on RA. Continue to monitor.   Aspiration pneumonia -resolved . WBC trending down.  Oxygenation stable.  D/C Zosyn switched to Levaquin for two more days coverage.   HF - ? with elevated proBNP, Echo with normal EF. Off of Lasix  due to  worsening Cr.   palliative - discussed with guardian, consulted palliative and ethics committee.   depression/anxiety- at home was on  Escitalopram 10 mg. F/u psych   weight loss- improved on puree diet with supplementary diet   sacral wound stage 4- Continue Collagen topical powder and calcium alginate with zinc oxide to periwound. F/u wound care team. Offload pressure.  HTN- home med Amlodipine 10 mg . D/ C Losartan, due to worsening Cr.   HLD - home meds as Simvastatin 20 mg on hold   urinary retention - home meds as  urecholine TID on hold   DVT ppx- Eliquis 5 mg BID  Parkinson's - Sinemet 25/100 mg TID  Hypothyroidism- Synthroid to 100 mcg.

## 2022-07-27 NOTE — PROGRESS NOTE ADULT - ASSESSMENT
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 Limited history, obtained through paperwork from nursing home. Patient was suspected to have bilateral pneumonia based on chest X-ray at nursing home yesterday 7/8. On initial presentation patient was afebrile, had non labored breathing on 6L nasal canula, and was AOx1. In the ED, patient was found to be hypercapnic with a PCO2 of 104 and pH of 7.19 off a VBG. Patient was placed on avaps; however she continued to be hypercapnic and acidotic with repeat VBG showing PCO2 of 117 and pH of 7.09. At this time, patient was intubated.      hypernatremia  start d5w 50 cc per hr     ACUTE RENAL FAILURE: due to diuresis and hemodynamic arb , decrease po intake , hypotension hypoperfusion non oliguric atn , also r/o ATN dc zosyn , start Levaquin    dc diuretics   Serum creatinine is increased    There is  progression . No uremic symptoms  Patient remains asymptomatic.   Continue current therapy.  hold   ACE inhibitor.  hold   ARB.        f/u  blood and urine cx,serial lactate levels,monitor vitals drew vargas hydration,monitor urine output and renal profile,iv abx as per id cons

## 2022-07-27 NOTE — CONSULT NOTE ADULT - CONSULT REASON
To assist the team in ethical dilemma posed by a 89 year old woman with dementia, Parkinson’s Disease, and WESTON, currently with guarded prognosis, with court appointed guardian without authority for advanced care planning.
Acute respiratory failure
hypernatremia
sacral wound
bilateral foot DTIs
GOC in patient with guardian

## 2022-07-27 NOTE — CONSULT NOTE ADULT - SUBJECTIVE AND OBJECTIVE BOX
Consult requested by:  Dr. EDVIN Rodríguez  Role: Attending	Service: Palliative   Contact#(s): [phone]  Attending: Dr. MARY Huang, Medicine			   Consultants: Alfredo Coronado LCSW, [facility], Ethics Fellow; Sue Boyer MD ,Pediatric Nephrology/Ethics Fellow; Contact # O) [phone]    Consult purpose: To assist the team in ethical dilemma posed by a 89 year old woman with dementia, Parkinson’s Disease, and WESTON, currently with guarded prognosis, with court appointed guardian without authority for advanced care planning.    Clinical summary: This is a 89 year old female, with a history of Parkinson’s disease, dementia, hypertension, chronic deep vein thromboses on anticoagulation, hypothyroidism, gait instability, and pressure ulcers, who initially presented to the hospital from a nursing home, with hypoxia, requiring intubation in the setting of respiratory failure.  During her hospital course, she was found to have bilateral pneumonia, thought secondary to aspiration.  Since admission, she was started on broad spectrum antibiotics, with improved respiratory status, and was able to be extubated, with de-escalation of care to internal medicine unit from MICU.  Throughout her hospitalization, she has been noted to have a worsening acute kidney injury, with up-trending uremia and creatinine, although patient remains asymptomatic at this time.  At the time of this consult, it is unclear as to if the patient is a candidate for hemodialysis, prognostication in progress by Nephrology. Patient with court appointed legal guardian (20 years), Evaristo Reddy, who is in agreement with no hemodialysis, DNR and DNI but is without authority for advanced care planning. Primary team has been in contact with the guardian throughout the course of her hospitalization regarding clinical status and plan of care. Per Palliative Team, as of 7/25/22, the patient’s guardian is unable and unavailable to participate in decision making at this time, and deferred decision making to the hospital team. Ethics consult initiated to address issues of end of life decision making in a patient with legal guardian with questionable decision-making authority.    Prognosis Estimate (survival in hrs, days, wks, mos, yrs): guarded  Patient Decision-Making Capacity: Has Capacity Lacks capacity deemed by the court pursuant to Article 81 of the Mental Hygiene Law.  Patient Aware of:  Diagnosis:   Yes    No   Unknown    Prognosis:   Yes    No   Unknown       Name of medical decision-maker should patient lack capacity (relationship): Court Appointed Mental Hygiene Legal Guardian  Evaristo Reddy [phone] (10/30/2003)  Other Decision-Maker (i.e., HCA or Surrogate) Aware of:  Diagnosis: Yes   No      Prognosis:   Yes     No  Other Stakeholders: n/a  Evidence of Patient’s Preference of Life-Sustaining Treatment (Written or Oral): none  Resuscitation status:   DNR:  Yes   No      DNI:  Yes   No             Discussion between EDVIN Rodríguez MD (Palliative Care Attending) and MARY Coronado LCSW (Ethics Fellow) on 7/26/2022: Ethics consult initiated by Palliative. Patient with dementia, Parkinson’s Disease, and WESTON with issue of decision-making in a patient with legal guardian with questionable decision-making authority.    Discussion between EDVIN Rodríguez MD (Palliative Attending), MARY Huang MD (Attending- Medicine), and MARY Coronado LCSW (Ethics Fellow) 7/26/2022: Discussed patient’s medical condition. Patient with worsening renal status and nephrology consulted but have not yet provided recommendation for hemodialysis. Patient with poor prognosis without hemodialysis, but this intervention may still be offered.     Discussion between LINDA Barry (Court Appointed Mental Hygiene Legal Guardian), EDVIN Rodríguez MD (Palliative Care Attending), and MARY Coronado LCSW (Ethics Fellow) 7/26/2022: Discussed patient’s guardianship status and medical decision-making. Advance care planning including DNR and DNI are not within the medical decision-making haskins of the guardianship. LINDA Reddy states that she is agreeable to no hemodialysis, and DNR/I status, but is deferring to two (2) physicians to determine advance care planning if and when appropriate, but states that she is unable and unavailable to petition the court for above mentioned orders.        Bioethics analysis: The central ethical issue is the conflict involving (A) patient autonomy and provider   beneficence and (B) provider nonmaleficence     The conflict that exists in this case is often encountered for acutely ill patients without decision-making capacity. At one end of the conflict exists the bioethical principle of autonomy, which is addressed by the [facility] [place] Care Decision Act (FHCDA), which states that a Mental Health Law (MHL) Article 81 court-appointed guardian as the primary decision-maker for the incapable patient (i). The ethical standard the surrogate is supposed to apply to this decision-making is first "substituted judgement" based on the patient’s prior articulation of preferences for such situation or second, "best interests".     In this case, the court order prevents the guardian from consenting to the withholding or withdrawal of life-sustaining treatment or to the implementation of a Do Not Resuscitate (DNR) and/or Do Not Intubate (DNI) order absent of further order from the court. The guardian in this case is additionally unable and unavailable to petition the court regarding these orders. In the setting of a guardian who is unable to make decisions regarding withholding/withdrawing of care or the implementation DNR/I, and who is unavailable to petition the courts regarding these topics, decision-making then falls to the [facility] Care Decisions Act (FHCDA). Under the FHCDA without an available or willing decision-maker, the attending physician may authorize major medical treatment after consultation and concurrence with the patient’s health care team and an independent physician not involved with the patient’s care that concurs with the treatment (ii).    It should be noted, however, that [facility] Law imposes some constraints on patients’ and clinicians’ decisions about forgoing life sustaining treatments (LSTs). When a patient is seriously ill and survival is in doubt, the CDA authorizes the decision makers to forgo life-sustaining treatment, in accordance with the patient’s best interests. Under the FHDCA, a decision to withhold LSTs must meet two criteria:   • Criterion 1: Treatment would be an extraordinary burden to the patient   AND: (Select a or b)   a. The patient has an illness or injury which can be expected to cause death within six months, whether or not treatment is provided; OR   b. The patient is permanently unconscious.   • Criterion 2: The patient has an irreversible or incurable condition; AND the provision of treatment would involve such pain, suffering or other burden that it would reasonably be deemed inhumane or extraordinarily burdensome under the circumstances.    In this case, the patient is currently incapacitated but is not critically ill at this time.  This patient is alert, awake, and able to eat by mouth, despite overall deconditioned status. The status of this patient’s acute kidney injury, and her need for hemodialysis (HD) has not yet been established.  If the patients WESTON were to worsen, or she were to develop symptomatic uremia, worsening electrolyte abnormalities, or fluid overload, requiring initiation of HD, further decisions would be warranted.  In that case, in the event HD is not being offered, the guardian can defer the authority to institute the DNR/DNI orders to two (2) clinicians.     In the event that patient is deemed to be terminally ill with overall poor prognosis for recovery, clinicians can reflect on the pillars of beneficence and non-maleficence as guidance for next steps. Beneficence calls on clinicians to honor and preserve a patient’s sense of dignity and personhood, to promote the best possible health or quality of living or dying with aggressive interventions when these help prolong lie with "good quality", with comfort care when cure and remission are not possible and the aim is to achieve the best "quality of dying". The same is true for the clinician’s duty to non-maleficence, avoiding medical interventions whose risk and burden exceeds their benefit ("first do no harm"). Beneficence relates to the health care provider’s responsibility to promote the patient’s health and overall well-being whilst minimizing pain and suffering. A central ethical issue in this case is whether additional medical interventions can help improve the outcome of this patient. Physicians are under no legal or ethical obligation to offer treatments that would 1) provide no medically indicated benefit or 2) impose unnecessary risk or burden to the patient.        The patient additionally has an innate moral status- that is, her distinct personhood, personal experience and interpretation of suffering, life-story, etc.- which must be respected. Decisions concerning treatment are complex and must be taken into consideration. The benefits of LSTs must be weighed against the harm from LSTs that may cause when it no longer improves prognosis, but introduces suffering and risk. Therefore, until it is certain that specific treatments will be non-beneficial for their proposed goal, current treatments should continue, and possible future treatments should be explored.     Thus, the critical question remains whom has the ability to determine advance directives should patient’s status become "critical" or "terminal". Under the Family Health Care Decisions Act for patients without capacity and without health care proxy, two physicians can determine advance care planning.      Consult requested by:  Dr. EDVIN Rodríguez  Role: Attending	Service: Palliative   Contact#(s): [phone]  Attending: Dr. MARY Huang, Medicine			   Consultants: Alfredo Coronado LCSW, [facility], Ethics Fellow; Sue Boyer MD ,Pediatric Nephrology/Ethics Fellow; Contact # O) [phone]    Consult purpose: To assist the team in ethical dilemma posed by a 89 year old woman with dementia, Parkinson’s Disease, and WESTON, currently with guarded prognosis, with court appointed guardian without authority for advanced care planning.    Clinical summary: This is a 89 year old female, with a history of Parkinson’s disease, dementia, hypertension, chronic deep vein thromboses on anticoagulation, hypothyroidism, gait instability, and pressure ulcers, who initially presented to the hospital from a nursing home, with hypoxia, requiring intubation in the setting of respiratory failure.  During her hospital course, she was found to have bilateral pneumonia, thought secondary to aspiration.  Since admission, she was started on broad spectrum antibiotics, with improved respiratory status, and was able to be extubated, with de-escalation of care to internal medicine unit from MICU.  Throughout her hospitalization, she has been noted to have a worsening acute kidney injury, with up-trending uremia and creatinine, although patient remains asymptomatic at this time.  At the time of this consult, it is unclear as to if the patient is a candidate for hemodialysis, prognostication in progress by Nephrology. Patient with court appointed legal guardian (20 years), Evaristo Reddy, who is in agreement with no hemodialysis, DNR and DNI but is without authority for advanced care planning. Primary team has been in contact with the guardian throughout the course of her hospitalization regarding clinical status and plan of care. Per Palliative Team, as of 7/25/22, the patient’s guardian is unable and unavailable to participate in decision making at this time, and deferred decision making to the hospital team. Ethics consult initiated to address issues of end of life decision making in a patient with legal guardian with questionable decision-making authority.    Prognosis Estimate (survival in hrs, days, wks, mos, yrs): guarded  Patient Decision-Making Capacity: Has Capacity Lacks capacity deemed by the court pursuant to Article 81 of the Mental Hygiene Law.  Patient Aware of:  Diagnosis:   Yes    No   Unknown    Prognosis:   Yes    No   Unknown       Name of medical decision-maker should patient lack capacity (relationship): Court Appointed Mental Hygiene Legal Guardian  Evaristo Reddy [phone] (10/30/2003)  Other Decision-Maker (i.e., HCA or Surrogate) Aware of:  Diagnosis: Yes   No      Prognosis:   Yes     No  Other Stakeholders: n/a  Evidence of Patient’s Preference of Life-Sustaining Treatment (Written or Oral): none  Resuscitation status:   DNR:  Yes   No      DNI:  Yes   No             Discussion between EDVIN Rodríguez MD (Palliative Care Attending) and MARY Coronado LCSW (Ethics Fellow) on 7/26/2022: Ethics consult initiated by Palliative. Patient with dementia, Parkinson’s Disease, and WESTON with issue of decision-making in a patient with legal guardian with questionable decision-making authority.    Discussion between EDVIN Rodríguez MD (Palliative Attending), MARY Huang MD (Attending- Medicine), and MARY Coronado LCSW (Ethics Fellow) 7/26/2022: Discussed patient’s medical condition. Patient with worsening renal status and nephrology consulted but have not yet provided recommendation for hemodialysis. Patient with poor prognosis without hemodialysis, but this intervention may still be offered.     Discussion between LINDA Barry (Court Appointed Mental Hygiene Legal Guardian), EDVIN Rodríguez MD (Palliative Care Attending), and MARY Coronado LCSW (Ethics Fellow) 7/26/2022: Discussed patient’s guardianship status and medical decision-making. Advance care planning including DNR and DNI are not within the medical decision-making haskins of the guardianship. LINDA Reddy states that she is agreeable to no hemodialysis, and DNR/I status, but is deferring to two (2) physicians to determine advance care planning if and when appropriate, but states that she is unable and unavailable to petition the court for above mentioned orders.        Bioethics analysis: The central ethical issue is the conflict involving (A) patient autonomy and provider   beneficence and (B) provider nonmaleficence     The conflict that exists in this case is often encountered for acutely ill patients without decision-making capacity. At one end of the conflict exists the bioethical principle of autonomy, which is addressed by the [facility] Family [place] Care Decision Act (FHCDA), which states that a Mental Health Law (MHL) Article 81 court-appointed guardian as the primary decision-maker for the incapable patient (i). The ethical standard the surrogate is supposed to apply to this decision-making is first "substituted judgement" based on the patient’s prior articulation of preferences for such situation or second, "best interests".     In this case, the court order prevents the guardian from consenting to the withholding or withdrawal of life-sustaining treatment or to the implementation of a Do Not Resuscitate (DNR) and/or Do Not Intubate (DNI) order absent of further order from the court. The guardian in this case is additionally unable and unavailable to petition the court regarding these orders. In the setting of a guardian who is unable to make decisions regarding withholding/withdrawing of life sustaining treatments or the implementation DNR/I, and who is unavailable to petition the courts regarding these topics, decision-making then falls to the [facility] Health Care Decisions Act (FHCDA). Under the FHCDA without an available or willing decision-maker, the attending physician may authorize major medical treatment after consultation and concurrence with the patient’s health care team and an independent physician not involved with the patient’s care that concurs with the treatment (ii).    It should be noted, however, that [facility] Law imposes some constraints on patients’ and clinicians’ decisions about forgoing life sustaining treatments (LSTs). When a patient is seriously ill and survival is in doubt, the CDA authorizes the decision makers to forgo life-sustaining treatment, in accordance with the patient’s best interests. Under the FHDCA, a decision to withhold LSTs must meet two criteria:   • Criterion 1: Treatment would be an extraordinary burden to the patient   AND: (Select a or b)   a. The patient has an illness or injury which can be expected to cause death within six months, whether or not treatment is provided; OR   b. The patient is permanently unconscious.   • Criterion 2: The patient has an irreversible or incurable condition; AND the provision of treatment would involve such pain, suffering or other burden that it would reasonably be deemed inhumane or extraordinarily burdensome under the circumstances.    In this case, the patient is currently incapacitated but is not critically ill at this time.  This patient is alert, awake, and able to eat by mouth, despite overall deconditioned status. The status of this patient’s acute kidney injury, and her need for hemodialysis (HD) has not yet been established.  If the patients WESTON were to worsen, or she were to develop symptomatic uremia, worsening electrolyte abnormalities, or fluid overload, requiring initiation of HD, further decisions would be warranted.  In that case, in the event HD is not being offered, the guardian can defer the authority to institute the DNR/DNI orders to two (2) clinicians.     In the event that patient is deemed to be terminally ill with overall poor prognosis for recovery, clinicians can reflect on the pillars of beneficence and non-maleficence as guidance for next steps. Beneficence calls on clinicians to honor and preserve a patient’s sense of dignity and personhood, to promote the best possible health or quality of living or dying with aggressive interventions when these help prolong lie with "good quality", with comfort care when cure and remission are not possible and the aim is to achieve the best "quality of dying". The same is true for the clinician’s duty to non-maleficence, avoiding medical interventions whose risk and burden exceeds their benefit ("first do no harm"). Beneficence relates to the health care provider’s responsibility to promote the patient’s health and overall well-being whilst minimizing pain and suffering. A central ethical issue in this case is whether additional medical interventions can help improve the outcome of this patient. Physicians are under no legal or ethical obligation to offer treatments that would 1) provide no medically indicated benefit or 2) impose unnecessary risk or burden to the patient.        The patient additionally has an innate moral status- that is, her distinct personhood, personal experience and interpretation of suffering, life-story, etc.- which must be respected. Decisions concerning treatment are complex and must be taken into consideration. The benefits of LSTs must be weighed against the harm from LSTs that may cause when it no longer improves prognosis, but introduces suffering and risk. Therefore, until it is certain that specific treatments will be non-beneficial for their proposed goal, current treatments should continue, and possible future treatments should be explored.     Thus, the critical question remains whom has the ability to determine advance directives should patient’s status become "critical" or "terminal". Under the Family Health Care Decisions Act for patients without capacity and without health care proxy, two physicians can determine advance care planning.

## 2022-07-28 LAB
ANION GAP SERPL CALC-SCNC: 20 MMOL/L — HIGH (ref 5–17)
BUN SERPL-MCNC: 84 MG/DL — HIGH (ref 7–23)
CALCIUM SERPL-MCNC: 8.2 MG/DL — LOW (ref 8.4–10.5)
CHLORIDE SERPL-SCNC: 97 MMOL/L — SIGNIFICANT CHANGE UP (ref 96–108)
CO2 SERPL-SCNC: 20 MMOL/L — LOW (ref 22–31)
CREAT SERPL-MCNC: 3.74 MG/DL — HIGH (ref 0.5–1.3)
EGFR: 11 ML/MIN/1.73M2 — LOW
GLUCOSE SERPL-MCNC: 105 MG/DL — HIGH (ref 70–99)
HCT VFR BLD CALC: 28.4 % — LOW (ref 34.5–45)
HGB BLD-MCNC: 9.2 G/DL — LOW (ref 11.5–15.5)
MAGNESIUM SERPL-MCNC: 2.5 MG/DL — SIGNIFICANT CHANGE UP (ref 1.6–2.6)
MCHC RBC-ENTMCNC: 29.2 PG — SIGNIFICANT CHANGE UP (ref 27–34)
MCHC RBC-ENTMCNC: 32.4 GM/DL — SIGNIFICANT CHANGE UP (ref 32–36)
MCV RBC AUTO: 90.2 FL — SIGNIFICANT CHANGE UP (ref 80–100)
NRBC # BLD: 0 /100 WBCS — SIGNIFICANT CHANGE UP (ref 0–0)
PHOSPHATE SERPL-MCNC: 7.4 MG/DL — HIGH (ref 2.5–4.5)
PLATELET # BLD AUTO: 349 K/UL — SIGNIFICANT CHANGE UP (ref 150–400)
POTASSIUM SERPL-MCNC: 4 MMOL/L — SIGNIFICANT CHANGE UP (ref 3.5–5.3)
POTASSIUM SERPL-SCNC: 4 MMOL/L — SIGNIFICANT CHANGE UP (ref 3.5–5.3)
RBC # BLD: 3.15 M/UL — LOW (ref 3.8–5.2)
RBC # FLD: 14.2 % — SIGNIFICANT CHANGE UP (ref 10.3–14.5)
SARS-COV-2 RNA SPEC QL NAA+PROBE: SIGNIFICANT CHANGE UP
SARS-COV-2 RNA SPEC QL NAA+PROBE: SIGNIFICANT CHANGE UP
SODIUM SERPL-SCNC: 137 MMOL/L — SIGNIFICANT CHANGE UP (ref 135–145)
WBC # BLD: 7.18 K/UL — SIGNIFICANT CHANGE UP (ref 3.8–10.5)
WBC # FLD AUTO: 7.18 K/UL — SIGNIFICANT CHANGE UP (ref 3.8–10.5)

## 2022-07-28 RX ADMIN — Medication 3 MILLILITER(S): at 12:20

## 2022-07-28 RX ADMIN — Medication 3 MILLILITER(S): at 23:07

## 2022-07-28 RX ADMIN — APIXABAN 2.5 MILLIGRAM(S): 2.5 TABLET, FILM COATED ORAL at 05:16

## 2022-07-28 RX ADMIN — Medication 100 MICROGRAM(S): at 05:15

## 2022-07-28 RX ADMIN — SODIUM CHLORIDE 45 MILLILITER(S): 9 INJECTION INTRAMUSCULAR; INTRAVENOUS; SUBCUTANEOUS at 17:54

## 2022-07-28 RX ADMIN — CARBIDOPA AND LEVODOPA 1 TABLET(S): 25; 100 TABLET ORAL at 21:03

## 2022-07-28 RX ADMIN — Medication 3 MILLILITER(S): at 17:54

## 2022-07-28 RX ADMIN — CARBIDOPA AND LEVODOPA 1 TABLET(S): 25; 100 TABLET ORAL at 05:15

## 2022-07-28 RX ADMIN — Medication 3 MILLILITER(S): at 05:15

## 2022-07-28 RX ADMIN — CHLORHEXIDINE GLUCONATE 1 APPLICATION(S): 213 SOLUTION TOPICAL at 05:16

## 2022-07-28 RX ADMIN — APIXABAN 2.5 MILLIGRAM(S): 2.5 TABLET, FILM COATED ORAL at 17:54

## 2022-07-28 RX ADMIN — SODIUM CHLORIDE 45 MILLILITER(S): 9 INJECTION INTRAMUSCULAR; INTRAVENOUS; SUBCUTANEOUS at 05:13

## 2022-07-28 RX ADMIN — CARBIDOPA AND LEVODOPA 1 TABLET(S): 25; 100 TABLET ORAL at 12:23

## 2022-07-28 NOTE — PROGRESS NOTE ADULT - ASSESSMENT
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 Limited history, obtained through paperwork from nursing home. Patient was suspected to have bilateral pneumonia based on chest X-ray at nursing home yesterday 7/8. On initial presentation patient was afebrile, had non labored breathing on 6L nasal canula, and was AOx1. In the ED, patient was found to be hypercapnic with a PCO2 of 104 and pH of 7.19 off a VBG. Patient was placed on avaps; however she continued to be hypercapnic and acidotic with repeat VBG showing PCO2 of 117 and pH of 7.09. At this time, patient was intubated.      hypernatremia  start d5w 50 cc per hr     ACUTE RENAL FAILURE: due to diuresis and hemodynamic arb , decrease po intake , hypotension hypoperfusion non oliguric atn , and now with   AIN dc zosyn , start Levaquin    dc diuretics   Serum creatinine is improved     There is  progression . No uremic symptoms  Patient remains asymptomatic.   Continue current therapy.  hold   ACE inhibitor.  hold   ARB.        f/u  blood and urine cx,serial lactate levels,monitor vitals drew vargas hydration,monitor urine output and renal profile,iv abx as per id cons

## 2022-07-28 NOTE — CHART NOTE - NSCHARTNOTEFT_GEN_A_CORE
Chart reviewed, case discussed with ethics and Dr. Huang.  I have personally reached out to nephrology, no answer.    However, in the meantime, creatinine returned today, somewhat improved compared to 7/27.  If this represents renal recovery, would continue to follow trend. per nephrology last note, no role at this time for dialysis.  No role for 2PC at this time, either, unless clinical condition worsens.    Please page if acute issues. available for reconsult if needed  [phone]

## 2022-07-28 NOTE — CHART NOTE - NSCHARTNOTEFT_GEN_A_CORE
Ethics continues to follow this case. Ethics Robins Alfredo (writer) and Dong Santacruz met with patient at bedside today. Team introduced selves and attempted to speak with patient. Patient unable to participate in discussion today due to mental status, and was unable to identify her location, or answer any questions posed by providers. As capacity relates to the abilities of a patient to communicate a choice, to understand the relevant information, to appreciate the medical consequences of the situation, and to reason about treatment choices, patient clearly lacking capacity at this time ([place], 2007). Case further discussed today with Dr. Rodríguez (Palliative Care Attending). Prognostication still in progress. Ethics will continue to follow this case.

## 2022-07-28 NOTE — CHART NOTE - NSCHARTNOTESELECT_GEN_ALL_CORE
GAP team/Event Note
Covid exposure/Event Note
Ethics Fellow
Event Note
Event Note
Fever 100.6/Event Note
[facility]AP team/Event Note
Nutrition Services
Sodium/Event Note
Transfer Note
Detail Level: Detailed
Quality 131: Pain Assessment And Follow-Up: Pain assessment using a standardized tool is documented as negative, no follow-up plan required
Quality 431: Preventive Care And Screening: Unhealthy Alcohol Use - Screening: Patient screened for unhealthy alcohol use using a single question and scores less than 2 times per year
Quality 226: Preventive Care And Screening: Tobacco Use: Screening And Cessation Intervention: Patient screened for tobacco use and is an ex/non-smoker
Quality 130: Documentation Of Current Medications In The Medical Record: Current Medications Documented
Quality 110: Preventive Care And Screening: Influenza Immunization: Influenza immunization was not ordered or administered, reason not given

## 2022-07-28 NOTE — PROGRESS NOTE ADULT - ASSESSMENT
89-Year-old Female PMH Dementia, Parkinson's Disease, Dysphagia, DVT, Muscle Weakness, Gait Instability, Pressure Ulcer, DVT, HTN, HYPOTHYROIDISM, DEPRESSION, PARKINSONS, MOOD DISORDER, DEMENTIA, DECUBITUS ULCERS, SEPSIS, REFLUX, INSOMNIA, CONSTIPATION, HLD who was found hypoxic in SNF last night, she was treated with one dose of IM lasix and IV Zosyn, O2 initially improved but then dropped again to 80s with respiratory distress. She was transferred to [facility] for more evaluation due to acute respiratory failure. She was found with Co2 retention and acidosis. She was started on BIPAP and IV broad abx therapy and was admitted in MICU.     fever- unknown origin. F/u CXR, blood cx, UA, U C&S, RVP panel. COVID so far negative. She was  exposed to positive roommate, on airborne isolation. Continue IV Zosyn for now.   hypernatremia- resolved .   WESTON- Cre getting worse.  Off of Lasix and ACEI/ARBS. D/c Zaroxolyn.  Cre trending up.  D/C foster catheter. Discontinue D5 IVF and started on NS by nephrologist and Cre down to 3.7.     Acute hypercapnic / hypoxic respiratory failure - s/p intubation on AC, sedated with Fentanyl transition to Precedex  f/u with icu as primary team.  Patient extubated today 7/15/22, tolerating well on RA. Continue to monitor.   Aspiration pneumonia -resolved . WBC trending down.  Oxygenation stable.  D/C Zosyn switched to Levaquin for two more days coverage.   HF - ? with elevated proBNP, Echo with normal EF. Off of Lasix  due to  worsening Cr.   palliative - discussed with guardian, consulted palliative and ethics committee.   depression/anxiety- at home was on  Escitalopram 10 mg. F/u psych   weight loss- improved on puree diet with supplementary diet   sacral wound stage 4- Continue Collagen topical powder and calcium alginate with zinc oxide to periwound. F/u wound care team. Offload pressure.  HTN- home med Amlodipine 10 mg . D/ C Losartan, due to worsening Cr.   HLD - home meds as Simvastatin 20 mg on hold   urinary retention - home meds as  urecholine TID on hold   DVT ppx- Eliquis 5 mg BID  Parkinson's - Sinemet 25/100 mg TID  Hypothyroidism- Synthroid to 100 mcg.

## 2022-07-28 NOTE — PROGRESS NOTE ADULT - SUBJECTIVE AND OBJECTIVE BOX
Patient is a 89y Female whom presented to the hospital with hypernatremia      PAST MEDICAL & SURGICAL HISTORY:  HTN (hypertension)      Parkinsons      HLD (hyperlipidemia)      DVT, lower extremity      Hypothyroid      No significant past surgical history          MEDICATIONS  (STANDING):  ALBUTerol    90 MICROgram(s) HFA Inhaler      albuterol/ipratropium for Nebulization 3 milliLiter(s) Nebulizer every 6 hours  amLODIPine   Tablet 10 milliGRAM(s) Oral daily  apixaban 5 milliGRAM(s) Oral every 12 hours  ascorbic acid 500 milliGRAM(s) Oral daily  bethanechol 25 milliGRAM(s) Oral three times a day  carbidopa/levodopa  25/100 1 Tablet(s) Oral three times a day  chlorhexidine 4% Liquid 1 Application(s) Topical <User Schedule>  dextrose 5%. 1000 milliLiter(s) (50 mL/Hr) IV Continuous <Continuous>  furosemide    Tablet 40 milliGRAM(s) Oral daily  levothyroxine 100 MICROGram(s) Oral daily  losartan 50 milliGRAM(s) Oral daily  metolazone 5 milliGRAM(s) Oral daily  multivitamin 1 Tablet(s) Oral daily      Allergies    No Known Allergies    Intolerances  [facility]      SOCIAL HISTORY:  Denies ETOh,Smoking,     FAMILY HISTORY:      REVIEW OF SYSTEMS:    CONSTITUTIONAL: No weakness, fevers or chills  RESPIRATORY: No cough, wheezing, hemoptysis; No shortness of breath  CARDIOVASCULAR: No chest pain or palpitations  GASTROINTESTINAL: No abdominal or epigastric pain. No nausea, vomiting,   GENITOURINARY: No dysuria, frequency or hematuria  SKIN: dry                                                                                               9.2    7.18  )-----------( 349      ( 28 Jul 2022 12:07 )             28.4       CBC Full  -  ( 28 Jul 2022 12:07 )  WBC Count : 7.18 K/uL  RBC Count : 3.15 M/uL  Hemoglobin : 9.2 g/dL  Hematocrit : 28.4 %  Platelet Count - Automated : 349 K/uL  Mean Cell Volume : 90.2 fl  Mean Cell Hemoglobin : 29.2 pg  Mean Cell Hemoglobin Concentration : 32.4 gm/dL  Auto Neutrophil # : x  Auto Lymphocyte # : x  Auto Monocyte # : x  Auto Eosinophil # : x  Auto Basophil # : x  Auto Neutrophil % : x  Auto Lymphocyte % : x  Auto Monocyte % : x  Auto Eosinophil % : x  Auto Basophil % : x      07-28    137  |  97  |  84<H>  ----------------------------<  105<H>  4.0   |  20<L>  |  3.74<H>    Ca    8.2<L>      28 Jul 2022 12:07  Phos  7.4     07-28  Mg     2.5     07-28        CAPILLARY BLOOD GLUCOSE          Vital Signs Last 24 Hrs  T(C): 36.8 (28 Jul 2022 16:52), Max: 37.2 (28 Jul 2022 00:00)  T(F): 98.2 (28 Jul 2022 16:52), Max: 99 (28 Jul 2022 00:00)  HR: 84 (28 Jul 2022 16:52) (68 - 86)  BP: 125/66 (28 Jul 2022 16:52) (105/65 - 150/83)  BP(mean): --  RR: 18 (28 Jul 2022 16:52) (18 - 18)  SpO2: 92% (28 Jul 2022 16:52) (92% - 99%)    Parameters below as of 28 Jul 2022 16:52  Patient On (Oxygen Delivery Method): nasal cannula  O2 Flow (L/min): 2                    PHYSICAL EXAM:    Constitutional: NAD  HEENT: conjunctive   clear   Neck:  No JVD  Respiratory: CTAB  Cardiovascular: S1 and S2  Gastrointestinal: BS+, soft, NT/ND  Extremities: No peripheral edema

## 2022-07-28 NOTE — PROGRESS NOTE ADULT - SUBJECTIVE AND OBJECTIVE BOX
Patient is a 89y old  Female who presents with a chief complaint of Shortness of breath (27 Jul 2022 16:06)      INTERVAL HPI/OVERNIGHT EVENTS: No event. Renal function mildly improved. Started on NS over last night at rate of 45 cc /hr. Cre down to 3.7. Patient stable. I spoke with Dr. Head nephrologist  and PA, plan is discharging her if Cre keeps  trending down and monitor renal function  in SNF.     Pain Location & Control: OK    MEDICATIONS  (STANDING):  ALBUTerol    90 MICROgram(s) HFA Inhaler      albuterol/ipratropium for Nebulization 3 milliLiter(s) Nebulizer every 6 hours  apixaban 2.5 milliGRAM(s) Oral every 12 hours  carbidopa/levodopa  25/100 1 Tablet(s) Oral three times a day  chlorhexidine 4% Liquid 1 Application(s) Topical <User Schedule>  epoetin jus-epbx (RETACRIT) Injectable 98724 Unit(s) SubCutaneous <User Schedule>  levothyroxine 100 MICROGram(s) Oral daily  sodium chloride 0.9%. 1000 milliLiter(s) (45 mL/Hr) IV Continuous <Continuous>    MEDICATIONS  (PRN):  acetaminophen     Tablet .. 650 milliGRAM(s) Oral every 6 hours PRN Temp greater or equal to 38C (100.4F), Mild Pain (1 - 3)      Allergies    No Known Allergies    Intolerances        REVIEW OF SYSTEMS:  CONSTITUTIONAL: No fever, weight loss, or fatigue  EYES: No eye pain, visual disturbances, or discharge  ENMT:  No difficulty hearing, tinnitus, vertigo; No sinus or throat pain  NECK: No pain or stiffness  BREASTS: No pain, masses, or nipple discharge  RESPIRATORY: No cough, wheezing, chills or hemoptysis; No shortness of breath  CARDIOVASCULAR: No chest pain, palpitations, dizziness, or leg swelling  GASTROINTESTINAL: No abdominal or epigastric pain. No nausea, vomiting, or hematemesis; No diarrhea or constipation. No melena or hematochezia.  GENITOURINARY: No dysuria, frequency, hematuria, or incontinence  NEUROLOGICAL: No headaches, memory loss, loss of strength, numbness, or tremors  SKIN: No itching, burning, rashes, or lesions   LYMPH NODES: No enlarged glands  ENDOCRINE: No heat or cold intolerance; No hair loss; No polydipsia or polyuria  MUSCULOSKELETAL: No back pain  PSYCHIATRIC: No depression, anxiety, mood swings, or difficulty sleeping  HEME/LYMPH: No easy bruising, or bleeding gums  ALLERGY AND IMMUNOLOGIC: No hives or eczema    Vital Signs Last 24 Hrs  T(C): 36.8 (28 Jul 2022 16:52), Max: 37.2 (28 Jul 2022 00:00)  T(F): 98.2 (28 Jul 2022 16:52), Max: 99 (28 Jul 2022 00:00)  HR: 84 (28 Jul 2022 16:52) (68 - 86)  BP: 125/66 (28 Jul 2022 16:52) (105/65 - 150/83)  BP(mean): --  RR: 18 (28 Jul 2022 16:52) (18 - 18)  SpO2: 92% (28 Jul 2022 16:52) (92% - 99%)    Parameters below as of 28 Jul 2022 16:52  Patient On (Oxygen Delivery Method): nasal cannula  O2 Flow (L/min): 2      PHYSICAL EXAM:  GENERAL: mild dementia.   HEAD:  Atraumatic, Normocephalic  EYES: EOMI, PERRLA, conjunctiva and sclera clear  ENMT: No tonsillar erythema, exudates, or enlargement; Moist mucous membranes, Good dentition, No lesions  NECK: Supple, No JVD, Normal thyroid  NERVOUS SYSTEM:  Alert & Oriented X1, Good concentration; Motor Strength 5/5 B/L upper and lower extremities; DTRs 2+ intact and symmetric  CHEST/LUNG: Clear to auscultation bilaterally; No rales, rhonchi, wheezing, or rubs  HEART: Regular rate and rhythm; No murmurs, rubs, or gallops  ABDOMEN: Soft, Nontender, Nondistended; Bowel sounds present  EXTREMITIES:  2+ Peripheral Pulses, No clubbing or cyanosis  LYMPH: No lymphadenopathy noted  SKIN: sacral pressure ulcer, foot ulcer      LABS:                        9.2    7.18  )-----------( 349      ( 28 Jul 2022 12:07 )             28.4     28 Jul 2022 12:07    137    |  97     |  84     ----------------------------<  105    4.0     |  20     |  3.74     Ca    8.2        28 Jul 2022 12:07  Phos  7.4       28 Jul 2022 12:07  Mg     2.5       28 Jul 2022 12:07          CAPILLARY BLOOD GLUCOSE            Cultures  Culture Results:   No Growth Final (07-22-22 @ 13:26)      RADIOLOGY & ADDITIONAL TESTS:    Imaging Personally Reviewed:  [ X] YES  [ ] NO    Consultant(s) Notes Reviewed:  [X ] YES  [ ] NO    Care Discussed with Consultants/Other Providers [X ] YES  [ ] NO

## 2022-07-29 VITALS
RESPIRATION RATE: 18 BRPM | OXYGEN SATURATION: 93 % | SYSTOLIC BLOOD PRESSURE: 138 MMHG | DIASTOLIC BLOOD PRESSURE: 67 MMHG | TEMPERATURE: 98 F | HEART RATE: 84 BPM

## 2022-07-29 LAB
ANION GAP SERPL CALC-SCNC: 20 MMOL/L — HIGH (ref 5–17)
BUN SERPL-MCNC: 82 MG/DL — HIGH (ref 7–23)
CALCIUM SERPL-MCNC: 8.6 MG/DL — SIGNIFICANT CHANGE UP (ref 8.4–10.5)
CHLORIDE SERPL-SCNC: 101 MMOL/L — SIGNIFICANT CHANGE UP (ref 96–108)
CO2 SERPL-SCNC: 20 MMOL/L — LOW (ref 22–31)
CREAT SERPL-MCNC: 3.31 MG/DL — HIGH (ref 0.5–1.3)
EGFR: 13 ML/MIN/1.73M2 — LOW
GLUCOSE SERPL-MCNC: 81 MG/DL — SIGNIFICANT CHANGE UP (ref 70–99)
MAGNESIUM SERPL-MCNC: 2.6 MG/DL — SIGNIFICANT CHANGE UP (ref 1.6–2.6)
PHOSPHATE SERPL-MCNC: 7.6 MG/DL — HIGH (ref 2.5–4.5)
POTASSIUM SERPL-MCNC: 3.4 MMOL/L — LOW (ref 3.5–5.3)
POTASSIUM SERPL-SCNC: 3.4 MMOL/L — LOW (ref 3.5–5.3)
SODIUM SERPL-SCNC: 141 MMOL/L — SIGNIFICANT CHANGE UP (ref 135–145)

## 2022-07-29 PROCEDURE — 82947 ASSAY GLUCOSE BLOOD QUANT: CPT

## 2022-07-29 PROCEDURE — 93005 ELECTROCARDIOGRAM TRACING: CPT

## 2022-07-29 PROCEDURE — 76775 US EXAM ABDO BACK WALL LIM: CPT

## 2022-07-29 PROCEDURE — 71045 X-RAY EXAM CHEST 1 VIEW: CPT

## 2022-07-29 PROCEDURE — 87641 MR-STAPH DNA AMP PROBE: CPT

## 2022-07-29 PROCEDURE — 94002 VENT MGMT INPAT INIT DAY: CPT

## 2022-07-29 PROCEDURE — 86704 HEP B CORE ANTIBODY TOTAL: CPT

## 2022-07-29 PROCEDURE — 86665 EPSTEIN-BARR CAPSID VCA: CPT

## 2022-07-29 PROCEDURE — 87640 STAPH A DNA AMP PROBE: CPT

## 2022-07-29 PROCEDURE — 0225U NFCT DS DNA&RNA 21 SARSCOV2: CPT

## 2022-07-29 PROCEDURE — 36415 COLL VENOUS BLD VENIPUNCTURE: CPT

## 2022-07-29 PROCEDURE — 80074 ACUTE HEPATITIS PANEL: CPT

## 2022-07-29 PROCEDURE — 80202 ASSAY OF VANCOMYCIN: CPT

## 2022-07-29 PROCEDURE — 86780 TREPONEMA PALLIDUM: CPT

## 2022-07-29 PROCEDURE — U0005: CPT

## 2022-07-29 PROCEDURE — 94640 AIRWAY INHALATION TREATMENT: CPT

## 2022-07-29 PROCEDURE — 87522 HEPATITIS C REVRS TRNSCRPJ: CPT

## 2022-07-29 PROCEDURE — 84484 ASSAY OF TROPONIN QUANT: CPT

## 2022-07-29 PROCEDURE — 84443 ASSAY THYROID STIM HORMONE: CPT

## 2022-07-29 PROCEDURE — 84145 PROCALCITONIN (PCT): CPT

## 2022-07-29 PROCEDURE — 82565 ASSAY OF CREATININE: CPT

## 2022-07-29 PROCEDURE — 96374 THER/PROPH/DIAG INJ IV PUSH: CPT

## 2022-07-29 PROCEDURE — 85610 PROTHROMBIN TIME: CPT

## 2022-07-29 PROCEDURE — 93306 TTE W/DOPPLER COMPLETE: CPT

## 2022-07-29 PROCEDURE — 31500 INSERT EMERGENCY AIRWAY: CPT

## 2022-07-29 PROCEDURE — U0003: CPT

## 2022-07-29 PROCEDURE — 92610 EVALUATE SWALLOWING FUNCTION: CPT

## 2022-07-29 PROCEDURE — 82803 BLOOD GASES ANY COMBINATION: CPT

## 2022-07-29 PROCEDURE — 94003 VENT MGMT INPAT SUBQ DAY: CPT

## 2022-07-29 PROCEDURE — 86664 EPSTEIN-BARR NUCLEAR ANTIGEN: CPT

## 2022-07-29 PROCEDURE — 85027 COMPLETE CBC AUTOMATED: CPT

## 2022-07-29 PROCEDURE — 86663 EPSTEIN-BARR ANTIBODY: CPT

## 2022-07-29 PROCEDURE — 87040 BLOOD CULTURE FOR BACTERIA: CPT

## 2022-07-29 PROCEDURE — 87086 URINE CULTURE/COLONY COUNT: CPT

## 2022-07-29 PROCEDURE — 82330 ASSAY OF CALCIUM: CPT

## 2022-07-29 PROCEDURE — 87340 HEPATITIS B SURFACE AG IA: CPT

## 2022-07-29 PROCEDURE — 80048 BASIC METABOLIC PNL TOTAL CA: CPT

## 2022-07-29 PROCEDURE — 81001 URINALYSIS AUTO W/SCOPE: CPT

## 2022-07-29 PROCEDURE — 82553 CREATINE MB FRACTION: CPT

## 2022-07-29 PROCEDURE — 85025 COMPLETE CBC W/AUTO DIFF WBC: CPT

## 2022-07-29 PROCEDURE — 82435 ASSAY OF BLOOD CHLORIDE: CPT

## 2022-07-29 PROCEDURE — 87389 HIV-1 AG W/HIV-1&-2 AB AG IA: CPT

## 2022-07-29 PROCEDURE — 82962 GLUCOSE BLOOD TEST: CPT

## 2022-07-29 PROCEDURE — 83880 ASSAY OF NATRIURETIC PEPTIDE: CPT

## 2022-07-29 PROCEDURE — 83605 ASSAY OF LACTIC ACID: CPT

## 2022-07-29 PROCEDURE — 80053 COMPREHEN METABOLIC PANEL: CPT

## 2022-07-29 PROCEDURE — 94660 CPAP INITIATION&MGMT: CPT

## 2022-07-29 PROCEDURE — 97165 OT EVAL LOW COMPLEX 30 MIN: CPT

## 2022-07-29 PROCEDURE — 85018 HEMOGLOBIN: CPT

## 2022-07-29 PROCEDURE — 87186 SC STD MICRODIL/AGAR DIL: CPT

## 2022-07-29 PROCEDURE — 84295 ASSAY OF SERUM SODIUM: CPT

## 2022-07-29 PROCEDURE — 96375 TX/PRO/DX INJ NEW DRUG ADDON: CPT

## 2022-07-29 PROCEDURE — 86706 HEP B SURFACE ANTIBODY: CPT

## 2022-07-29 PROCEDURE — 84132 ASSAY OF SERUM POTASSIUM: CPT

## 2022-07-29 PROCEDURE — 70450 CT HEAD/BRAIN W/O DYE: CPT | Mod: MA

## 2022-07-29 PROCEDURE — 99291 CRITICAL CARE FIRST HOUR: CPT | Mod: 25

## 2022-07-29 PROCEDURE — 84100 ASSAY OF PHOSPHORUS: CPT

## 2022-07-29 PROCEDURE — 85014 HEMATOCRIT: CPT

## 2022-07-29 PROCEDURE — 85730 THROMBOPLASTIN TIME PARTIAL: CPT

## 2022-07-29 PROCEDURE — 83735 ASSAY OF MAGNESIUM: CPT

## 2022-07-29 RX ORDER — LEVOTHYROXINE SODIUM 125 MCG
1 TABLET ORAL
Qty: 0 | Refills: 0 | DISCHARGE
Start: 2022-07-29

## 2022-07-29 RX ORDER — LEVOTHYROXINE SODIUM 125 MCG
1 TABLET ORAL
Qty: 0 | Refills: 0 | DISCHARGE

## 2022-07-29 RX ORDER — POTASSIUM CHLORIDE 20 MEQ
1 PACKET (EA) ORAL
Qty: 0 | Refills: 0 | DISCHARGE

## 2022-07-29 RX ORDER — METOPROLOL TARTRATE 50 MG
1 TABLET ORAL
Qty: 0 | Refills: 0 | DISCHARGE

## 2022-07-29 RX ORDER — FLUTICASONE PROPIONATE 220 MCG
2 AEROSOL WITH ADAPTER (GRAM) INHALATION
Qty: 0 | Refills: 0 | DISCHARGE

## 2022-07-29 RX ORDER — APIXABAN 2.5 MG/1
1 TABLET, FILM COATED ORAL
Qty: 0 | Refills: 0 | DISCHARGE

## 2022-07-29 RX ORDER — FUROSEMIDE 40 MG
1 TABLET ORAL
Qty: 0 | Refills: 0 | DISCHARGE

## 2022-07-29 RX ORDER — LACTOBACILLUS ACIDOPHILUS 100MM CELL
1 CAPSULE ORAL
Qty: 0 | Refills: 0 | DISCHARGE

## 2022-07-29 RX ORDER — CARBIDOPA AND LEVODOPA 25; 100 MG/1; MG/1
1 TABLET ORAL
Qty: 0 | Refills: 0 | DISCHARGE

## 2022-07-29 RX ORDER — POTASSIUM CHLORIDE 20 MEQ
20 PACKET (EA) ORAL ONCE
Refills: 0 | Status: DISCONTINUED | OUTPATIENT
Start: 2022-07-29 | End: 2022-07-29

## 2022-07-29 RX ORDER — CARBIDOPA AND LEVODOPA 25; 100 MG/1; MG/1
1 TABLET ORAL
Qty: 0 | Refills: 0 | DISCHARGE
Start: 2022-07-29

## 2022-07-29 RX ORDER — APIXABAN 2.5 MG/1
1 TABLET, FILM COATED ORAL
Qty: 0 | Refills: 0 | DISCHARGE
Start: 2022-07-29

## 2022-07-29 RX ORDER — AMLODIPINE BESYLATE 2.5 MG/1
1 TABLET ORAL
Qty: 0 | Refills: 0 | DISCHARGE

## 2022-07-29 RX ORDER — LOSARTAN POTASSIUM 100 MG/1
1 TABLET, FILM COATED ORAL
Qty: 0 | Refills: 0 | DISCHARGE

## 2022-07-29 RX ORDER — ERYTHROPOIETIN 10000 [IU]/ML
20000 INJECTION, SOLUTION INTRAVENOUS; SUBCUTANEOUS
Qty: 0 | Refills: 0 | DISCHARGE
Start: 2022-07-29

## 2022-07-29 RX ORDER — BETHANECHOL CHLORIDE 25 MG
1 TABLET ORAL
Qty: 0 | Refills: 0 | DISCHARGE

## 2022-07-29 RX ADMIN — CHLORHEXIDINE GLUCONATE 1 APPLICATION(S): 213 SOLUTION TOPICAL at 05:28

## 2022-07-29 RX ADMIN — Medication 3 MILLILITER(S): at 12:42

## 2022-07-29 RX ADMIN — Medication 3 MILLILITER(S): at 05:27

## 2022-07-29 RX ADMIN — ERYTHROPOIETIN 20000 UNIT(S): 10000 INJECTION, SOLUTION INTRAVENOUS; SUBCUTANEOUS at 10:37

## 2022-07-29 RX ADMIN — Medication 100 MICROGRAM(S): at 05:27

## 2022-07-29 RX ADMIN — APIXABAN 2.5 MILLIGRAM(S): 2.5 TABLET, FILM COATED ORAL at 05:27

## 2022-07-29 RX ADMIN — CARBIDOPA AND LEVODOPA 1 TABLET(S): 25; 100 TABLET ORAL at 05:27

## 2022-07-29 NOTE — PROGRESS NOTE ADULT - PROVIDER SPECIALTY LIST ADULT
Internal Medicine
MICU
Wound Care
Hospitalist
Internal Medicine
Internal Medicine
MICU
Nephrology
Internal Medicine
MICU
Nephrology
Wound Care
MICU
Nephrology
Podiatry
Internal Medicine

## 2022-07-29 NOTE — PROGRESS NOTE ADULT - REASON FOR ADMISSION

## 2022-07-29 NOTE — PROVIDER CONTACT NOTE (OTHER) - REASON
Rash
only able to obtain 1 set of blood cultures after Central line RN placed an IV in pt, since pt is hard stick
qns pt inr    and  bilateral hands  contracted with ?fungal
rectal temperature 100.6; pt also hasn't had IV since last night, night RN's attempted, I attempted a few times but unable to get IV successfully. notified PICC and central line RN's

## 2022-07-29 NOTE — DISCHARGE NOTE NURSING/CASE MANAGEMENT/SOCIAL WORK - PATIENT PORTAL LINK FT
You can access the FollowMyHealth Patient Portal offered by [facility] by registering at the following website: [email or web address]. By joining [facility]’s FollowMyHealth portal, you will also be able to view your health information using other applications (apps) compatible with our system.

## 2022-07-29 NOTE — PROVIDER CONTACT NOTE (OTHER) - ASSESSMENT
arms are slightly swollen, ecchymotic, pt very hard stick
pt alert to name contracted
resting in bed, and patient is very confused

## 2022-07-29 NOTE — PROGRESS NOTE ADULT - NUTRITIONAL ASSESSMENT
MEDICATIONS  (STANDING):  ALBUTerol    90 MICROgram(s) HFA Inhaler      albuterol/ipratropium for Nebulization 3 milliLiter(s) Nebulizer every 6 hours  amLODIPine   Tablet 10 milliGRAM(s) Oral daily  apixaban 5 milliGRAM(s) Oral every 12 hours  ascorbic acid 500 milliGRAM(s) Oral daily  bethanechol 25 milliGRAM(s) Oral three times a day  carbidopa/levodopa  25/100 1 Tablet(s) Oral three times a day  chlorhexidine 4% Liquid 1 Application(s) Topical <User Schedule>  dextrose 5%. 1000 milliLiter(s) (75 mL/Hr) IV Continuous <Continuous>  levothyroxine 100 MICROGram(s) Oral daily  metolazone 5 milliGRAM(s) Oral daily  multivitamin 1 Tablet(s) Oral daily

## 2022-07-29 NOTE — PROVIDER CONTACT NOTE (OTHER) - ACTION/TREATMENT ORDERED:
NP notified, will also notify night RN to attempt again for the blood cultures when doing next set of routine AM labs. attending Dr. Huang at bedside also made aware.
Provider notified, central line RN notified and to come place IV later.
No additional action done at  this time, will continue to monitor.
np aware  continue plan care  safety checks  bleed prec  maintained

## 2022-07-29 NOTE — DISCHARGE NOTE NURSING/CASE MANAGEMENT/SOCIAL WORK - NSDCPEFALRISK_GEN_ALL_CORE
For information on Fall & Injury Prevention, visit: [email or web address] OR  [email or web address] OR  https://www.cdc.gov/steadi/patient.html

## 2022-07-29 NOTE — PROGRESS NOTE ADULT - SUBJECTIVE AND OBJECTIVE BOX
Patient is a 89y Female whom presented to the hospital with hypernatremia      PAST MEDICAL & SURGICAL HISTORY:  HTN (hypertension)      Parkinsons      HLD (hyperlipidemia)      DVT, lower extremity      Hypothyroid      No significant past surgical history          MEDICATIONS  (STANDING):  ALBUTerol    90 MICROgram(s) HFA Inhaler      albuterol/ipratropium for Nebulization 3 milliLiter(s) Nebulizer every 6 hours  amLODIPine   Tablet 10 milliGRAM(s) Oral daily  apixaban 5 milliGRAM(s) Oral every 12 hours  ascorbic acid 500 milliGRAM(s) Oral daily  bethanechol 25 milliGRAM(s) Oral three times a day  carbidopa/levodopa  25/100 1 Tablet(s) Oral three times a day  chlorhexidine 4% Liquid 1 Application(s) Topical <User Schedule>  dextrose 5%. 1000 milliLiter(s) (50 mL/Hr) IV Continuous <Continuous>  furosemide    Tablet 40 milliGRAM(s) Oral daily  levothyroxine 100 MICROGram(s) Oral daily  losartan 50 milliGRAM(s) Oral daily  metolazone 5 milliGRAM(s) Oral daily  multivitamin 1 Tablet(s) Oral daily      Allergies    No Known Allergies    Intolerances  [facility]      SOCIAL HISTORY:  Denies ETOh,Smoking,     FAMILY HISTORY:      REVIEW OF SYSTEMS:    CONSTITUTIONAL: No weakness, fevers or chills  RESPIRATORY: No cough, wheezing, hemoptysis; No shortness of breath  CARDIOVASCULAR: No chest pain or palpitations  GASTROINTESTINAL: No abdominal or epigastric pain. No nausea, vomiting,   GENITOURINARY: No dysuria, frequency or hematuria  SKIN: dry                                                                                               9.2    7.18  )-----------( 349      ( 28 Jul 2022 12:07 )             28.4       CBC Full  -  ( 28 Jul 2022 12:07 )  WBC Count : 7.18 K/uL  RBC Count : 3.15 M/uL  Hemoglobin : 9.2 g/dL  Hematocrit : 28.4 %  Platelet Count - Automated : 349 K/uL  Mean Cell Volume : 90.2 fl  Mean Cell Hemoglobin : 29.2 pg  Mean Cell Hemoglobin Concentration : 32.4 gm/dL  Auto Neutrophil # : x  Auto Lymphocyte # : x  Auto Monocyte # : x  Auto Eosinophil # : x  Auto Basophil # : x  Auto Neutrophil % : x  Auto Lymphocyte % : x  Auto Monocyte % : x  Auto Eosinophil % : x  Auto Basophil % : x      07-28    137  |  97  |  84<H>  ----------------------------<  105<H>  4.0   |  20<L>  |  3.74<H>    Ca    8.2<L>      28 Jul 2022 12:07  Phos  7.4     07-28  Mg     2.5     07-28        CAPILLARY BLOOD GLUCOSE          Vital Signs Last 24 Hrs  T(C): 36.8 (28 Jul 2022 16:52), Max: 37.2 (28 Jul 2022 00:00)  T(F): 98.2 (28 Jul 2022 16:52), Max: 99 (28 Jul 2022 00:00)  HR: 84 (28 Jul 2022 16:52) (68 - 86)  BP: 125/66 (28 Jul 2022 16:52) (105/65 - 150/83)  BP(mean): --  RR: 18 (28 Jul 2022 16:52) (18 - 18)  SpO2: 92% (28 Jul 2022 16:52) (92% - 99%)    Parameters below as of 28 Jul 2022 16:52  Patient On (Oxygen Delivery Method): nasal cannula  O2 Flow (L/min): 2                    PHYSICAL EXAM:    Constitutional: NAD  HEENT: conjunctive   clear   Neck:  No JVD  Respiratory: CTAB  Cardiovascular: S1 and S2  Gastrointestinal: BS+, soft, NT/ND  Extremities: No peripheral edema

## 2022-10-28 ENCOUNTER — EMERGENCY (EMERGENCY)
Facility: HOSPITAL | Age: 87
LOS: 1 days | Discharge: HOME | End: 2022-10-28
Attending: EMERGENCY MEDICINE
Payer: MEDICARE

## 2022-10-28 VITALS
TEMPERATURE: 99 F | SYSTOLIC BLOOD PRESSURE: 154 MMHG | DIASTOLIC BLOOD PRESSURE: 76 MMHG | HEART RATE: 72 BPM | RESPIRATION RATE: 18 BRPM | OXYGEN SATURATION: 94 %

## 2022-10-28 VITALS
WEIGHT: 100.09 LBS | HEART RATE: 82 BPM | SYSTOLIC BLOOD PRESSURE: 186 MMHG | TEMPERATURE: 98 F | HEIGHT: 62 IN | DIASTOLIC BLOOD PRESSURE: 88 MMHG | OXYGEN SATURATION: 99 %

## 2022-10-28 PROBLEM — I82.409 ACUTE EMBOLISM AND THROMBOSIS OF UNSPECIFIED DEEP VEINS OF UNSPECIFIED LOWER EXTREMITY: Chronic | Status: ACTIVE | Noted: 2022-07-09

## 2022-10-28 PROBLEM — E78.5 HYPERLIPIDEMIA, UNSPECIFIED: Chronic | Status: ACTIVE | Noted: 2022-07-09

## 2022-10-28 PROBLEM — G20 PARKINSON'S DISEASE: Chronic | Status: ACTIVE | Noted: 2022-07-09

## 2022-10-28 PROBLEM — E03.9 HYPOTHYROIDISM, UNSPECIFIED: Chronic | Status: ACTIVE | Noted: 2022-07-09

## 2022-10-28 PROBLEM — I10 ESSENTIAL (PRIMARY) HYPERTENSION: Chronic | Status: ACTIVE | Noted: 2022-07-09

## 2022-10-28 LAB
ALBUMIN SERPL ELPH-MCNC: 3.8 G/DL — SIGNIFICANT CHANGE UP (ref 3.3–5)
ALP SERPL-CCNC: 67 U/L — SIGNIFICANT CHANGE UP (ref 40–120)
ALT FLD-CCNC: <5 U/L — LOW (ref 10–45)
ANION GAP SERPL CALC-SCNC: 10 MMOL/L — SIGNIFICANT CHANGE UP (ref 5–17)
AST SERPL-CCNC: 27 U/L — SIGNIFICANT CHANGE UP (ref 10–40)
BASOPHILS # BLD AUTO: 0.06 K/UL — SIGNIFICANT CHANGE UP (ref 0–0.2)
BASOPHILS NFR BLD AUTO: 0.9 % — SIGNIFICANT CHANGE UP (ref 0–2)
BILIRUB SERPL-MCNC: 0.4 MG/DL — SIGNIFICANT CHANGE UP (ref 0.2–1.2)
BUN SERPL-MCNC: 29 MG/DL — HIGH (ref 7–23)
CALCIUM SERPL-MCNC: 9.1 MG/DL — SIGNIFICANT CHANGE UP (ref 8.4–10.5)
CHLORIDE SERPL-SCNC: 107 MMOL/L — SIGNIFICANT CHANGE UP (ref 96–108)
CO2 SERPL-SCNC: 28 MMOL/L — SIGNIFICANT CHANGE UP (ref 22–31)
CREAT SERPL-MCNC: 0.5 MG/DL — SIGNIFICANT CHANGE UP (ref 0.5–1.3)
EGFR: 88 ML/MIN/1.73M2 — SIGNIFICANT CHANGE UP
EOSINOPHIL # BLD AUTO: 0.03 K/UL — SIGNIFICANT CHANGE UP (ref 0–0.5)
EOSINOPHIL NFR BLD AUTO: 0.5 % — SIGNIFICANT CHANGE UP (ref 0–6)
GLUCOSE SERPL-MCNC: 126 MG/DL — HIGH (ref 70–99)
HCT VFR BLD CALC: 38.7 % — SIGNIFICANT CHANGE UP (ref 34.5–45)
HGB BLD-MCNC: 11.8 G/DL — SIGNIFICANT CHANGE UP (ref 11.5–15.5)
IMM GRANULOCYTES NFR BLD AUTO: 0.6 % — SIGNIFICANT CHANGE UP (ref 0–0.9)
LYMPHOCYTES # BLD AUTO: 0.46 K/UL — LOW (ref 1–3.3)
LYMPHOCYTES # BLD AUTO: 7.2 % — LOW (ref 13–44)
MCHC RBC-ENTMCNC: 29.3 PG — SIGNIFICANT CHANGE UP (ref 27–34)
MCHC RBC-ENTMCNC: 30.5 GM/DL — LOW (ref 32–36)
MCV RBC AUTO: 96 FL — SIGNIFICANT CHANGE UP (ref 80–100)
MONOCYTES # BLD AUTO: 0.31 K/UL — SIGNIFICANT CHANGE UP (ref 0–0.9)
MONOCYTES NFR BLD AUTO: 4.8 % — SIGNIFICANT CHANGE UP (ref 2–14)
NEUTROPHILS # BLD AUTO: 5.53 K/UL — SIGNIFICANT CHANGE UP (ref 1.8–7.4)
NEUTROPHILS NFR BLD AUTO: 86 % — HIGH (ref 43–77)
NRBC # BLD: 0 /100 WBCS — SIGNIFICANT CHANGE UP (ref 0–0)
PLATELET # BLD AUTO: 178 K/UL — SIGNIFICANT CHANGE UP (ref 150–400)
POTASSIUM SERPL-MCNC: 5.3 MMOL/L — SIGNIFICANT CHANGE UP (ref 3.5–5.3)
POTASSIUM SERPL-SCNC: 5.3 MMOL/L — SIGNIFICANT CHANGE UP (ref 3.5–5.3)
PROT SERPL-MCNC: 7.8 G/DL — SIGNIFICANT CHANGE UP (ref 6–8.3)
RBC # BLD: 4.03 M/UL — SIGNIFICANT CHANGE UP (ref 3.8–5.2)
RBC # FLD: 16.2 % — HIGH (ref 10.3–14.5)
SODIUM SERPL-SCNC: 145 MMOL/L — SIGNIFICANT CHANGE UP (ref 135–145)
WBC # BLD: 6.43 K/UL — SIGNIFICANT CHANGE UP (ref 3.8–10.5)
WBC # FLD AUTO: 6.43 K/UL — SIGNIFICANT CHANGE UP (ref 3.8–10.5)

## 2022-10-28 PROCEDURE — 72125 CT NECK SPINE W/O DYE: CPT | Mod: 26,MA

## 2022-10-28 PROCEDURE — 71045 X-RAY EXAM CHEST 1 VIEW: CPT

## 2022-10-28 PROCEDURE — 72170 X-RAY EXAM OF PELVIS: CPT

## 2022-10-28 PROCEDURE — 85025 COMPLETE CBC W/AUTO DIFF WBC: CPT

## 2022-10-28 PROCEDURE — 70450 CT HEAD/BRAIN W/O DYE: CPT | Mod: 26,MA

## 2022-10-28 PROCEDURE — 99285 EMERGENCY DEPT VISIT HI MDM: CPT | Mod: 25

## 2022-10-28 PROCEDURE — 72125 CT NECK SPINE W/O DYE: CPT | Mod: MA

## 2022-10-28 PROCEDURE — 72170 X-RAY EXAM OF PELVIS: CPT | Mod: 26

## 2022-10-28 PROCEDURE — 80053 COMPREHEN METABOLIC PANEL: CPT

## 2022-10-28 PROCEDURE — 99284 EMERGENCY DEPT VISIT MOD MDM: CPT

## 2022-10-28 PROCEDURE — 93010 ELECTROCARDIOGRAM REPORT: CPT | Mod: GC

## 2022-10-28 PROCEDURE — 71045 X-RAY EXAM CHEST 1 VIEW: CPT | Mod: 26

## 2022-10-28 PROCEDURE — 70450 CT HEAD/BRAIN W/O DYE: CPT | Mod: MA

## 2022-10-28 PROCEDURE — 93005 ELECTROCARDIOGRAM TRACING: CPT

## 2022-10-28 NOTE — ED ADULT NURSE NOTE - OBJECTIVE STATEMENT
Pt presented to ed sent from NH with c/o unwitnessed, fall out of the bed that happened today. pt is on Eliquis. pt does not recall what happened today. pt denies nausea, vomiting, fever, chills, sob, chest pain, headache, dizziness, numbness, weakness. Will continue to monitor.

## 2022-10-28 NOTE — ED PROVIDER NOTE - OBJECTIVE STATEMENT
89 yo F, hx dementia, Parkinson's Disease, Dysphagia, DVT, Muscle Weakness, Gait Instability, Pressure Ulcer, DVT, HTN, hypothyroidism, depression, mood disorder, dementia, decubitus ulcers, sepsis, reflux, insomnia, constipation, presenting s/p unwitnessed fall. On Eliquis. Unwitnessed fall. Poor historian. Found at feet of bed this AM, reported that she fell, but gives no other details. Sent by RN home Staff. Lives at [facility]. No reported changes in medications. NKDA.

## 2022-10-28 NOTE — ED ADULT NURSE NOTE - SKIN INTEGRITY
Stage 4 pressure ulcer noted to sacrum area, approx. [id number], with slough tissues. Skin tear noted RT forearm area, approx. 5cm. Stage 4 pressure ulcer noted to sacrum area, approx. [id number], with slough tissues. Skin tear noted RT forearm area, approx. 5cm. No active bleeding noted.

## 2022-10-28 NOTE — ED PROVIDER NOTE - PROGRESS NOTE DETAILS
Amandeep Trinh MD (PGY-2): pt head ct negative for acute pathology. serology stable. xr negative. vs wnl. pt clinically stable, at baseline. pt recollecting mechanical precipitation of fall, echo 3 months ago w/ LV systolic dysfunction but no iminent structural heart dx. pt lives under close observation and with frequent re-evaluation at nursing. low suspicion for cardiogenic syncope. will ask that she be seen by her doctor early next week for further evaluation of unwitnessed fall.

## 2022-10-28 NOTE — ED ADULT NURSE NOTE - NURSING MUSC EXTREMITY LIMITED ROM
dependent (less than 25% patients effort) to sponge bathe/dependent (less than 25% patients effort) contractions noted to all extremities/left upper extremity/right upper extremity/left lower extremity/right lower extremity

## 2022-10-28 NOTE — ED PROVIDER NOTE - NSFOLLOWUPINSTRUCTIONS_ED_ALL_ED_FT
HAVE THE PATIENT SEEN BY HER PRIMARY CARE DOCTOR WITHIN THE NEXT 1-3 DAYS FOR RAPID FOLLOW UP AFTER HER EMERGENCY DEPARTMENT VISIT.     Immediately have the patient return to the Emergency Department for any new or markedly worsening symptoms.

## 2022-10-28 NOTE — ED PROVIDER NOTE - ATTENDING CONTRIBUTION TO CARE
to be done 89 yo F, hx dementia, Parkinson's Disease, Dysphagia, DVT, Muscle Weakness, Gait Instability, Pressure Ulcer, DVT, HTN, hypothyroidism, depression, mood disorder, dementia, decubitus ulcers, sepsis, reflux, insomnia, constipation, presenting s/p unwitnessed fall. On Eliquis with no signs of trauma, ct head ordered, cxr, maew but contracted.

## 2022-10-28 NOTE — ED PROVIDER NOTE - PHYSICAL EXAMINATION
General: NAD.   Head: NCAT. No wounds, hematomas or active bleeding over the scalp.  Eyes: ***   HENT: Negative for Raccoon eyes or Pinzon's signs. Ears are visually unremarkable. No nasal septal deviation or nasal septal hematoma. Oral cavity and OP are clear, markedly poor dentition.  Chest: Chest wall is visually unremarkable. No clavicular or chest wall tenderness. Heart sounds = normal rate and rhythm w/out M/R/G.   Abdomen: Visually unremarkable. No tenderness or guarding with palpation.  /Sacrum: Visually unremarkable genitalia. Punctate chronic wound over the sacrum w/out cardinal signs of infection. Chaperone PCA Staff Ines.   MSK: The pelvis is stable w/ AP stressing. No gross deformity of the extremities. No wounds to the extremities. No point tenderness x4 extremities. Chronic contractures of the x4 extremities  Neurologic: Alert. Tangential thought process. random spontaneous movement of the extremities.   Spine: No midline tenderness. No maico step off deformity of the spine.

## 2022-10-28 NOTE — ED PROVIDER NOTE - PATIENT PORTAL LINK FT
You can access the FollowMyHealth Patient Portal offered by  by registering at the following website: [email or web address]. By joining [facility]’s FollowMyHealth portal, you will also be able to view your health information using other applications (apps) compatible with our system.

## 2023-03-24 PROBLEM — Z00.00 ENCOUNTER FOR PREVENTIVE HEALTH EXAMINATION: Status: ACTIVE | Noted: 2023-03-24

## 2024-02-07 NOTE — ED PROVIDER NOTE - DOMESTIC TRAVEL HIGH RISK QUESTION
No remaining functional goals.  The manual therapy interventions were performed at a separate and distinct time from the therapeutic activities interventions . (see flow sheet as applicable)    Details if applicable:  Anila taping, STM/MFR to R ITB/lateral quad and lateral hamstrings    63     Total Total         Modalities Rationale:     decrease pain and increase tissue extensibility to improve patient's ability to progress to PLOF and address remaining functional goals.       min [] Estim Unattended,             type/location:       []  w/ice    []  w/heat        min [] Estim Attended,             type/location:       []  w/ice   []  w/heat         []  w/US   []  TENS insruct            min []  Mechanical Traction,        type/lbs:        []  pro      []  sup           []  int       []  cont            []  before manual           []  after manual     min []  Ultrasound,         settings/location:      min  unbilled []  Ice     []  Heat            location/position:    10     min [x]  Vasopneumatic Device,      press/temp: 34/med   pre-treatment girth :    post-treatment girth :    measured at (landmark       location) :  R knee  If using vaso (only need to measure limb vaso being performed on)        min []  Other:        Skin assessment post-treatment (if applicable):    [x]  intact    []  redness- no adverse reaction                 []redness - adverse reaction:          [x]  Patient Education billed concurrently with other procedures   [x] Review HEP    [] Progressed/Changed HEP, detail:    [] Other detail:         Other Objective/Functional Measures  --    Pain Level at end of session (0-10 scale): 0/10 \"numb\"      Assessment   Progressed standing exercises with patient being relatively challenged with balance exercises and tendency for anterior compensation. Noticed that she frequently engages ankle DF to assist in quad activation.   Patient will continue to benefit from skilled PT / OT services to modify

## 2025-01-06 NOTE — OCCUPATIONAL THERAPY INITIAL EVALUATION ADULT - ADAPTIVE EQUIPMENT NEEDED
[JVD] : no jugular venous distention  [Normal Breath Sounds] : Normal breath sounds [Normal Heart Sounds] : normal heart sounds [Normal Rate and Rhythm] : normal rate and rhythm [No HSM] : no hepatosplenomegaly [No Rash or Lesion] : No rash or lesion [Alert] : alert [Oriented to Person] : oriented to person [Oriented to Place] : oriented to place [Oriented to Time] : oriented to time [Calm] : calm [de-identified] : well-appearing, NAD [de-identified] : TYE [de-identified] : soft, ND, NT, +BS, no recurrent hernia palpated. Incisions c/d/i. no